# Patient Record
Sex: FEMALE | Race: WHITE | Employment: FULL TIME | URBAN - METROPOLITAN AREA
[De-identification: names, ages, dates, MRNs, and addresses within clinical notes are randomized per-mention and may not be internally consistent; named-entity substitution may affect disease eponyms.]

---

## 2017-02-25 ENCOUNTER — OFFICE VISIT (OUTPATIENT)
Dept: FAMILY MEDICINE CLINIC | Facility: CLINIC | Age: 57
End: 2017-02-25

## 2017-02-25 VITALS
BODY MASS INDEX: 27.89 KG/M2 | SYSTOLIC BLOOD PRESSURE: 128 MMHG | RESPIRATION RATE: 16 BRPM | WEIGHT: 157.38 LBS | TEMPERATURE: 99 F | HEIGHT: 63 IN | OXYGEN SATURATION: 100 % | HEART RATE: 82 BPM | DIASTOLIC BLOOD PRESSURE: 88 MMHG

## 2017-02-25 DIAGNOSIS — J04.0 LARYNGITIS: Primary | ICD-10-CM

## 2017-02-25 DIAGNOSIS — J06.9 VIRAL URI WITH COUGH: ICD-10-CM

## 2017-02-25 DIAGNOSIS — K21.9 GASTROESOPHAGEAL REFLUX DISEASE, ESOPHAGITIS PRESENCE NOT SPECIFIED: ICD-10-CM

## 2017-02-25 PROCEDURE — 99213 OFFICE O/P EST LOW 20 MIN: CPT | Performed by: FAMILY MEDICINE

## 2017-02-25 NOTE — PROGRESS NOTES
Messi Flores is a 60year old female.  Patient presents with:  Voice Problem  Cough  Nasal Congestion  Gastro-esophageal Reflux: acid reflux at night per pt     HPI:   Italo Tam presents to the office with complaints of upper respiratory tract infection, nakul Never Used                        Alcohol Use: Yes                Comment: occasionally       REVIEW OF SYSTEMS:   GENERAL: feels well otherwise  SKIN: no rashes  EYES:denies blurred vision or double vision  HEENT: congested, cough  LUNGS: denies shortness

## 2017-04-24 ENCOUNTER — TELEPHONE (OUTPATIENT)
Dept: FAMILY MEDICINE CLINIC | Facility: CLINIC | Age: 57
End: 2017-04-24

## 2017-05-22 ENCOUNTER — TELEPHONE (OUTPATIENT)
Dept: FAMILY MEDICINE CLINIC | Facility: CLINIC | Age: 57
End: 2017-05-22

## 2017-07-03 ENCOUNTER — TELEPHONE (OUTPATIENT)
Dept: FAMILY MEDICINE CLINIC | Facility: CLINIC | Age: 57
End: 2017-07-03

## 2017-08-18 ENCOUNTER — TELEPHONE (OUTPATIENT)
Dept: FAMILY MEDICINE CLINIC | Facility: CLINIC | Age: 57
End: 2017-08-18

## 2017-08-30 ENCOUNTER — NURSE ONLY (OUTPATIENT)
Dept: FAMILY MEDICINE CLINIC | Facility: CLINIC | Age: 57
End: 2017-08-30

## 2017-08-30 DIAGNOSIS — R73.09 ELEVATED HEMOGLOBIN A1C: ICD-10-CM

## 2017-08-30 DIAGNOSIS — E78.00 ELEVATED CHOLESTEROL: ICD-10-CM

## 2017-08-30 DIAGNOSIS — Z00.00 WELLNESS EXAMINATION: ICD-10-CM

## 2017-08-30 LAB
25-HYDROXYVITAMIN D (TOTAL): 33.3 NG/ML (ref 30–100)
BASOPHILS # BLD AUTO: 0.06 X10(3) UL (ref 0–0.1)
BASOPHILS NFR BLD AUTO: 0.8 %
CHOLEST SMN-MCNC: 244 MG/DL (ref ?–200)
EOSINOPHIL # BLD AUTO: 0.42 X10(3) UL (ref 0–0.3)
EOSINOPHIL NFR BLD AUTO: 5.7 %
ERYTHROCYTE [DISTWIDTH] IN BLOOD BY AUTOMATED COUNT: 12.2 % (ref 11.5–16)
HCT VFR BLD AUTO: 40.1 % (ref 34–50)
HDLC SERPL-MCNC: 45 MG/DL (ref 45–?)
HDLC SERPL: 5.42 {RATIO} (ref ?–4.44)
HGB BLD-MCNC: 13.5 G/DL (ref 12–16)
IMMATURE GRANULOCYTE COUNT: 0.02 X10(3) UL (ref 0–1)
IMMATURE GRANULOCYTE RATIO %: 0.3 %
LDLC SERPL CALC-MCNC: 165 MG/DL (ref ?–130)
LDLC SERPL-MCNC: 34 MG/DL (ref 5–40)
LYMPHOCYTES # BLD AUTO: 2.7 X10(3) UL (ref 0.9–4)
LYMPHOCYTES NFR BLD AUTO: 36.6 %
MCH RBC QN AUTO: 29.9 PG (ref 27–33.2)
MCHC RBC AUTO-ENTMCNC: 33.7 G/DL (ref 31–37)
MCV RBC AUTO: 88.9 FL (ref 81–100)
MONOCYTES # BLD AUTO: 0.68 X10(3) UL (ref 0.1–0.6)
MONOCYTES NFR BLD AUTO: 9.2 %
NEUTROPHIL ABS PRELIM: 3.49 X10 (3) UL (ref 1.3–6.7)
NEUTROPHILS # BLD AUTO: 3.49 X10(3) UL (ref 1.3–6.7)
NEUTROPHILS NFR BLD AUTO: 47.4 %
NONHDLC SERPL-MCNC: 199 MG/DL (ref ?–130)
PLATELET # BLD AUTO: 345 10(3)UL (ref 150–450)
RBC # BLD AUTO: 4.51 X10(6)UL (ref 3.8–5.1)
RED CELL DISTRIBUTION WIDTH-SD: 40 FL (ref 35.1–46.3)
TRIGLYCERIDES: 170 MG/DL (ref ?–150)
TSI SER-ACNC: 2.63 MIU/ML (ref 0.35–5.5)
WBC # BLD AUTO: 7.4 X10(3) UL (ref 4–13)

## 2017-08-30 PROCEDURE — 80061 LIPID PANEL: CPT | Performed by: FAMILY MEDICINE

## 2017-08-30 PROCEDURE — 84443 ASSAY THYROID STIM HORMONE: CPT | Performed by: FAMILY MEDICINE

## 2017-08-30 PROCEDURE — 36415 COLL VENOUS BLD VENIPUNCTURE: CPT | Performed by: FAMILY MEDICINE

## 2017-08-30 PROCEDURE — 82306 VITAMIN D 25 HYDROXY: CPT | Performed by: FAMILY MEDICINE

## 2017-08-30 PROCEDURE — 85025 COMPLETE CBC W/AUTO DIFF WBC: CPT | Performed by: FAMILY MEDICINE

## 2017-08-30 NOTE — PROGRESS NOTES
Mint gold and lav tubes drawn from left arm with butterfly needle x1.  Pt bill well    She does have a hx of fainting in the past so for safety I had her lie down on the table

## 2017-08-31 ENCOUNTER — TELEPHONE (OUTPATIENT)
Dept: FAMILY MEDICINE CLINIC | Facility: CLINIC | Age: 57
End: 2017-08-31

## 2017-08-31 NOTE — TELEPHONE ENCOUNTER
Notes Recorded by Eliezer Urrutia MD on 8/31/2017 at 9:10 AM CDT  Please notify pt labs looking pretty good--cholesterol panel looks better than last year with 4 point improvement in LDL and 64 point improvement in trigs.  Both are still a bit elevated, but

## 2017-10-04 ENCOUNTER — TELEPHONE (OUTPATIENT)
Dept: FAMILY MEDICINE CLINIC | Facility: CLINIC | Age: 57
End: 2017-10-04

## 2017-10-04 NOTE — TELEPHONE ENCOUNTER
Patient has never had colonoscopy. Patient is overdue for annual physical with pap with [address]. Patient will call tomorrow when she is not at work to schedule.   Patient is also going to check with her insurance to ensure that cologuard would be covered as sh

## 2017-10-16 ENCOUNTER — OFFICE VISIT (OUTPATIENT)
Dept: FAMILY MEDICINE CLINIC | Facility: CLINIC | Age: 57
End: 2017-10-16

## 2017-10-16 VITALS
SYSTOLIC BLOOD PRESSURE: 142 MMHG | RESPIRATION RATE: 18 BRPM | BODY MASS INDEX: 28.24 KG/M2 | DIASTOLIC BLOOD PRESSURE: 98 MMHG | HEART RATE: 92 BPM | HEIGHT: 63 IN | WEIGHT: 159.38 LBS | TEMPERATURE: 99 F

## 2017-10-16 DIAGNOSIS — N93.9 VAGINAL BLEEDING: Primary | ICD-10-CM

## 2017-10-16 DIAGNOSIS — N32.81 OVERACTIVE BLADDER: ICD-10-CM

## 2017-10-16 DIAGNOSIS — I83.90 VARICOSE VEIN OF LEG: ICD-10-CM

## 2017-10-16 DIAGNOSIS — K64.4 HEMORRHOIDS, EXTERNAL: ICD-10-CM

## 2017-10-16 DIAGNOSIS — N39.3 STRESS INCONTINENCE: ICD-10-CM

## 2017-10-16 DIAGNOSIS — Z12.4 CERVICAL CANCER SCREENING: ICD-10-CM

## 2017-10-16 PROCEDURE — 88175 CYTOPATH C/V AUTO FLUID REDO: CPT | Performed by: FAMILY MEDICINE

## 2017-10-16 PROCEDURE — 81003 URINALYSIS AUTO W/O SCOPE: CPT | Performed by: FAMILY MEDICINE

## 2017-10-16 PROCEDURE — 87624 HPV HI-RISK TYP POOLED RSLT: CPT | Performed by: FAMILY MEDICINE

## 2017-10-16 PROCEDURE — 99214 OFFICE O/P EST MOD 30 MIN: CPT | Performed by: FAMILY MEDICINE

## 2017-10-16 PROCEDURE — 99000 SPECIMEN HANDLING OFFICE-LAB: CPT | Performed by: FAMILY MEDICINE

## 2017-10-16 NOTE — PROGRESS NOTES
Benito Ag is a 60year old female. HPI:   Pt is concerned about vaginal bleeding on Friday. The last time she had vaginal bleeding was years ago. She has not had hysterectomy. She noticed it when she wiped.  She has hemorrhoids so she checked to see Smoker                                                              Smokeless tobacco: Never Used                      Alcohol use:  Yes              Comment: occasionally         REVIEW OF SYSTEMS:   GENERAL HEALTH: feels well otherwise  SKIN: denies any u

## 2017-10-16 NOTE — PATIENT INSTRUCTIONS
Dr. Paula Leija and Dr. Katarina Arenas (they are OB-gynes who also treat veins)  [phone]     Kady (have several locations, closest I believe is near [place]): [phone]

## 2017-10-19 ENCOUNTER — TELEPHONE (OUTPATIENT)
Dept: FAMILY MEDICINE CLINIC | Facility: CLINIC | Age: 57
End: 2017-10-19

## 2017-10-19 NOTE — TELEPHONE ENCOUNTER
Notes Recorded by Lennox Alanis, MD on 10/19/2017 at 12:22 AM CDT  Please notify PAP is normal, high risk HPV is negative. We can repeat PAP smear in 3years if in a monogamous relationship (if any new partners I suggest within 1-2 years of new exposure).  L

## 2018-01-03 ENCOUNTER — OFFICE VISIT (OUTPATIENT)
Dept: FAMILY MEDICINE CLINIC | Facility: CLINIC | Age: 58
End: 2018-01-03

## 2018-01-03 VITALS
TEMPERATURE: 98 F | WEIGHT: 157.63 LBS | RESPIRATION RATE: 12 BRPM | BODY MASS INDEX: 27.93 KG/M2 | DIASTOLIC BLOOD PRESSURE: 80 MMHG | SYSTOLIC BLOOD PRESSURE: 120 MMHG | HEART RATE: 76 BPM | HEIGHT: 63 IN

## 2018-01-03 DIAGNOSIS — Z12.11 COLON CANCER SCREENING: ICD-10-CM

## 2018-01-03 DIAGNOSIS — Z12.39 SCREENING BREAST EXAMINATION: ICD-10-CM

## 2018-01-03 DIAGNOSIS — E78.00 PURE HYPERCHOLESTEROLEMIA: ICD-10-CM

## 2018-01-03 DIAGNOSIS — Z23 NEED FOR VACCINATION: ICD-10-CM

## 2018-01-03 DIAGNOSIS — Z00.00 ROUTINE HISTORY AND PHYSICAL EXAMINATION OF ADULT: Primary | ICD-10-CM

## 2018-01-03 DIAGNOSIS — E78.1 HYPERTRIGLYCERIDEMIA: ICD-10-CM

## 2018-01-03 PROCEDURE — 99396 PREV VISIT EST AGE 40-64: CPT | Performed by: FAMILY MEDICINE

## 2018-01-03 PROCEDURE — 90715 TDAP VACCINE 7 YRS/> IM: CPT | Performed by: FAMILY MEDICINE

## 2018-01-03 PROCEDURE — 90471 IMMUNIZATION ADMIN: CPT | Performed by: FAMILY MEDICINE

## 2018-01-03 NOTE — PROGRESS NOTES
HPI:   Radha Payan is a 59year old female who presents for a complete physical exam.  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   They've been together over 2 years, work together [employer] after mouth. Disp:  Rfl:    CRANBERRY OR Take by mouth. Disp:  Rfl:    CUSTOM MEDICATION Beet root Disp:  Rfl:    Omega-3 Fatty Acids (FISH OIL OR) Take by mouth.  Disp:  Rfl:    CUSTOM MEDICATION Vital Red Disp:  Rfl:    aspirin 81 MG Oral Tab Take 81 mg by mout BMs, no blood in stool  : denies dysuria, vaginal discharge or itching, periods absent  MUSCULOSKELETAL: + back pain see HPI, no joint pains or swelling  NEURO: denies headaches, no syncope or near syncope  PSYCHE: denies depression or anxiety  HEMATOLOG after talking to her insurance  Dexa Scan not indicated  The patient indicates understanding of these issues and agrees to the plan. The patient is asked to return for CPX in 1 year.   Routine history and physical examination of adult  (primary encounter d

## 2018-02-14 ENCOUNTER — HOSPITAL ENCOUNTER (OUTPATIENT)
Dept: CT IMAGING | Facility: HOSPITAL | Age: 58
Discharge: HOME | End: 2018-02-14
Attending: FAMILY MEDICINE

## 2018-02-14 DIAGNOSIS — Z13.6 SCREENING FOR HEART DISEASE: ICD-10-CM

## 2018-02-19 DIAGNOSIS — R93.89 ABNORMAL CT OF THE CHEST: Primary | ICD-10-CM

## 2018-02-20 ENCOUNTER — TELEPHONE (OUTPATIENT)
Dept: FAMILY MEDICINE CLINIC | Facility: CLINIC | Age: 58
End: 2018-02-20

## 2018-02-20 DIAGNOSIS — R93.89 ABNORMAL CT OF THE CHEST: Primary | ICD-10-CM

## 2018-02-26 ENCOUNTER — HOSPITAL ENCOUNTER (OUTPATIENT)
Dept: CT IMAGING | Age: 58
Discharge: HOME | End: 2018-02-26
Attending: FAMILY MEDICINE
Payer: COMMERCIAL

## 2018-02-26 DIAGNOSIS — R93.89 ABNORMAL CT OF THE CHEST: ICD-10-CM

## 2018-02-26 PROCEDURE — 71260 CT THORAX DX C+: CPT | Performed by: FAMILY MEDICINE

## 2018-03-07 ENCOUNTER — HOSPITAL ENCOUNTER (OUTPATIENT)
Dept: CARDIOLOGY CLINIC | Facility: HOSPITAL | Age: 58
Discharge: HOME | End: 2018-03-07
Attending: FAMILY MEDICINE

## 2018-03-07 DIAGNOSIS — Z13.9 ENCOUNTER FOR SCREENING: ICD-10-CM

## 2018-06-12 ENCOUNTER — MED REC SCAN ONLY (OUTPATIENT)
Dept: FAMILY MEDICINE CLINIC | Facility: CLINIC | Age: 58
End: 2018-06-12

## 2018-11-27 ENCOUNTER — PATIENT OUTREACH (OUTPATIENT)
Dept: FAMILY MEDICINE CLINIC | Facility: CLINIC | Age: 58
End: 2018-11-27

## 2019-02-07 ENCOUNTER — OFFICE VISIT (OUTPATIENT)
Dept: FAMILY MEDICINE CLINIC | Facility: CLINIC | Age: 59
End: 2019-02-07
Payer: COMMERCIAL

## 2019-02-07 VITALS
HEART RATE: 66 BPM | TEMPERATURE: 99 F | SYSTOLIC BLOOD PRESSURE: 150 MMHG | HEIGHT: 62 IN | WEIGHT: 142.19 LBS | RESPIRATION RATE: 14 BRPM | DIASTOLIC BLOOD PRESSURE: 90 MMHG | BODY MASS INDEX: 26.17 KG/M2

## 2019-02-07 DIAGNOSIS — M54.6 CHRONIC BILATERAL THORACIC BACK PAIN: ICD-10-CM

## 2019-02-07 DIAGNOSIS — E78.1 HYPERTRIGLYCERIDEMIA: ICD-10-CM

## 2019-02-07 DIAGNOSIS — K21.9 GASTROESOPHAGEAL REFLUX DISEASE, ESOPHAGITIS PRESENCE NOT SPECIFIED: ICD-10-CM

## 2019-02-07 DIAGNOSIS — G89.29 CHRONIC BILATERAL THORACIC BACK PAIN: ICD-10-CM

## 2019-02-07 DIAGNOSIS — R93.89 ABNORMAL CT OF THE CHEST: ICD-10-CM

## 2019-02-07 DIAGNOSIS — Z00.00 ROUTINE HISTORY AND PHYSICAL EXAMINATION OF ADULT: Primary | ICD-10-CM

## 2019-02-07 DIAGNOSIS — E78.00 PURE HYPERCHOLESTEROLEMIA: ICD-10-CM

## 2019-02-07 DIAGNOSIS — Z12.31 SCREENING MAMMOGRAM, ENCOUNTER FOR: ICD-10-CM

## 2019-02-07 PROCEDURE — 99396 PREV VISIT EST AGE 40-64: CPT | Performed by: FAMILY MEDICINE

## 2019-02-07 RX ORDER — METOPROLOL SUCCINATE 25 MG/1
25 TABLET, EXTENDED RELEASE ORAL DAILY
Qty: 90 TABLET | Refills: 0 | Status: SHIPPED | OUTPATIENT
Start: 2019-02-07 | End: 2019-05-29 | Stop reason: SINTOL

## 2019-02-07 NOTE — PATIENT INSTRUCTIONS
Neuropsychological exam:    [facility]  [phone]    Or    [facility]  [phone]

## 2019-02-07 NOTE — PROGRESS NOTES
HPI:   Meghan Hussein is a 59year old female who presents for a complete physical exam.      Patient complains of anxiety/stress regarding her 's health--he recently was diagnosed with bladder cancer, but then found a spot on lung, had it removed Cholesterol (mg/dL)   Date Value   08/30/2017 45 (L)   10/20/2016 51     HDL CHOLESTEROL (mg/dL)   Date Value   05/26/2015 47     LDL Cholesterol (mg/dL)   Date Value   08/30/2017 165 (H)   10/20/2016 169 (H)     LDL-CHOLESTEROL (mg/dL (calc))   Date Value heartburn, no nausea, no changes in BMs, no blood in stool  : denies dysuria, vaginal discharge or itching, periods absent  MUSCULOSKELETAL: same chronic back pain, no joint pains or swelling  NEURO: denies headaches, no syncope or near syncope  PSYCHE: specified  Well controlled, CPM  Abnormal CT chest  -had referred patient to pulm last year and didn't go; focusing on 's health; encouraged her to f/u with pulm      For disease prevention (dementia, cancer, diabetes, heart disease, depression, anx

## 2019-02-12 ENCOUNTER — APPOINTMENT (OUTPATIENT)
Dept: PHYSICAL THERAPY | Age: 59
End: 2019-02-12
Attending: FAMILY MEDICINE
Payer: COMMERCIAL

## 2019-02-18 ENCOUNTER — APPOINTMENT (OUTPATIENT)
Dept: PHYSICAL THERAPY | Age: 59
End: 2019-02-18
Attending: FAMILY MEDICINE
Payer: COMMERCIAL

## 2019-02-18 NOTE — PROGRESS NOTES
SPINE EVALUATION:   Referring Physician: Dr. Cathern Hatchet  Diagnosis: Chronic bilateral thoracic back pain (M54.6,G89.29)     Date of Service: 2/18/2019     PATIENT Shannon Collins is a 59year old y/o female who presents to therapy today with complai visits over a 90 day period. Treatment will include: Manual Therapy; Therapeutic Exercises; Neuromuscular Re-education; Therapeutic Activity;  Electrical Stim; Mechanical Traction; Pt education; Home exercise program instruction; ***    Education or treatme

## 2019-02-20 ENCOUNTER — APPOINTMENT (OUTPATIENT)
Dept: PHYSICAL THERAPY | Age: 59
End: 2019-02-20
Attending: FAMILY MEDICINE
Payer: COMMERCIAL

## 2019-02-25 ENCOUNTER — APPOINTMENT (OUTPATIENT)
Dept: PHYSICAL THERAPY | Age: 59
End: 2019-02-25
Attending: FAMILY MEDICINE
Payer: COMMERCIAL

## 2019-02-27 ENCOUNTER — APPOINTMENT (OUTPATIENT)
Dept: PHYSICAL THERAPY | Age: 59
End: 2019-02-27
Attending: FAMILY MEDICINE
Payer: COMMERCIAL

## 2019-03-04 ENCOUNTER — APPOINTMENT (OUTPATIENT)
Dept: PHYSICAL THERAPY | Age: 59
End: 2019-03-04
Attending: FAMILY MEDICINE
Payer: COMMERCIAL

## 2019-03-06 ENCOUNTER — APPOINTMENT (OUTPATIENT)
Dept: PHYSICAL THERAPY | Age: 59
End: 2019-03-06
Attending: FAMILY MEDICINE
Payer: COMMERCIAL

## 2019-03-11 ENCOUNTER — PATIENT OUTREACH (OUTPATIENT)
Dept: FAMILY MEDICINE CLINIC | Facility: CLINIC | Age: 59
End: 2019-03-11

## 2019-03-11 ENCOUNTER — APPOINTMENT (OUTPATIENT)
Dept: PHYSICAL THERAPY | Age: 59
End: 2019-03-11
Attending: FAMILY MEDICINE
Payer: COMMERCIAL

## 2019-03-13 ENCOUNTER — APPOINTMENT (OUTPATIENT)
Dept: PHYSICAL THERAPY | Age: 59
End: 2019-03-13
Attending: FAMILY MEDICINE
Payer: COMMERCIAL

## 2019-04-09 ENCOUNTER — TELEPHONE (OUTPATIENT)
Dept: FAMILY MEDICINE CLINIC | Facility: CLINIC | Age: 59
End: 2019-04-09

## 2019-04-09 DIAGNOSIS — Z12.11 SCREENING FOR MALIGNANT NEOPLASM OF COLON: Primary | ICD-10-CM

## 2019-05-09 ENCOUNTER — TELEPHONE (OUTPATIENT)
Dept: FAMILY MEDICINE CLINIC | Facility: CLINIC | Age: 59
End: 2019-05-09

## 2019-05-22 ENCOUNTER — NURSE ONLY (OUTPATIENT)
Dept: FAMILY MEDICINE CLINIC | Facility: CLINIC | Age: 59
End: 2019-05-22
Payer: COMMERCIAL

## 2019-05-22 VITALS — DIASTOLIC BLOOD PRESSURE: 90 MMHG | SYSTOLIC BLOOD PRESSURE: 142 MMHG | HEART RATE: 64 BPM

## 2019-05-22 NOTE — PROGRESS NOTES
PT states she was started on metoprolol recently for elevated BP. She took first dose and felt dizzy. She then was cutting pill in half and working her way up to full tab. She is still feeling slightly dizzy.  She also states when she checks her BP at home,

## 2019-05-23 ENCOUNTER — TELEPHONE (OUTPATIENT)
Dept: FAMILY MEDICINE CLINIC | Facility: CLINIC | Age: 59
End: 2019-05-23

## 2019-05-23 NOTE — TELEPHONE ENCOUNTER
See nurse visit notes from 5/22/19. LM for patient to bring home BP cuff to appt tomorrow with [facility]. Ok per [place] form.      Future Appointments   Date Time Provider Naima Rodas   5/24/2019  2:30 PM Tej Roman MD [facility] KENNY Norris

## 2019-05-24 ENCOUNTER — OFFICE VISIT (OUTPATIENT)
Dept: FAMILY MEDICINE CLINIC | Facility: CLINIC | Age: 59
End: 2019-05-24
Payer: COMMERCIAL

## 2019-05-24 VITALS
SYSTOLIC BLOOD PRESSURE: 140 MMHG | BODY MASS INDEX: 26.31 KG/M2 | WEIGHT: 143 LBS | TEMPERATURE: 97 F | OXYGEN SATURATION: 97 % | HEART RATE: 86 BPM | DIASTOLIC BLOOD PRESSURE: 86 MMHG | HEIGHT: 62 IN

## 2019-05-24 DIAGNOSIS — I10 ESSENTIAL HYPERTENSION: Primary | ICD-10-CM

## 2019-05-24 DIAGNOSIS — I49.9 IRREGULAR HEART RATE: ICD-10-CM

## 2019-05-24 PROCEDURE — 99214 OFFICE O/P EST MOD 30 MIN: CPT | Performed by: FAMILY MEDICINE

## 2019-05-24 PROCEDURE — 93000 ELECTROCARDIOGRAM COMPLETE: CPT | Performed by: FAMILY MEDICINE

## 2019-05-24 NOTE — PROGRESS NOTES
Artie Love is a 59year old female. HPI:   Patient here to f/u on her BP and medication.     I had prescribed metoprolol in feb but she got dizzy so stopped it after a few days (had a lot going on in life dind't want to mess with it) so restarted 1/2 II   • Hypertension Mother    • Other (Other) Mother         thyroid   • Cancer Maternal Grandfather         throat   • Other (Other) Sister         thyroid   • Diabetes Brother         adult onset, no complications      Social History    Tobacco Use

## 2019-05-29 ENCOUNTER — OFFICE VISIT (OUTPATIENT)
Dept: FAMILY MEDICINE CLINIC | Facility: CLINIC | Age: 59
End: 2019-05-29
Payer: COMMERCIAL

## 2019-05-29 VITALS
HEIGHT: 62 IN | BODY MASS INDEX: 26.5 KG/M2 | HEART RATE: 82 BPM | RESPIRATION RATE: 16 BRPM | DIASTOLIC BLOOD PRESSURE: 86 MMHG | SYSTOLIC BLOOD PRESSURE: 128 MMHG | WEIGHT: 144 LBS | TEMPERATURE: 99 F

## 2019-05-29 DIAGNOSIS — E78.00 PURE HYPERCHOLESTEROLEMIA: ICD-10-CM

## 2019-05-29 DIAGNOSIS — R94.31 ABNORMAL EKG: ICD-10-CM

## 2019-05-29 DIAGNOSIS — R93.1 ELEVATED CORONARY ARTERY CALCIUM SCORE: Primary | ICD-10-CM

## 2019-05-29 DIAGNOSIS — T88.7XXA MEDICATION SIDE EFFECT: ICD-10-CM

## 2019-05-29 PROCEDURE — 93000 ELECTROCARDIOGRAM COMPLETE: CPT | Performed by: FAMILY MEDICINE

## 2019-05-29 PROCEDURE — 99213 OFFICE O/P EST LOW 20 MIN: CPT | Performed by: FAMILY MEDICINE

## 2019-05-29 NOTE — PROGRESS NOTES
Meghan Hussein is a 59year old female. HPI:   Patient here to f/u on her recent visit in which we suspected a metoprolol SE causing her symptoms and kaleb/PVCs. She felt completely back to normal within a few days of stopping the metoprolol.   Danhaja GENERAL: well developed, well nourished,in no apparent distress  LUNGS: clear to auscultation  CARDIO: RRR without murmur  EXTREMITIES: no cyanosis, clubbing or edema    ASSESSMENT AND PLAN:   Diagnoses and all orders for this visit:    Elevated coronary

## 2019-07-02 ENCOUNTER — OFFICE VISIT (OUTPATIENT)
Dept: CARDIOLOGY | Age: 59
End: 2019-07-02

## 2019-07-02 VITALS — HEART RATE: 65 BPM | SYSTOLIC BLOOD PRESSURE: 122 MMHG | DIASTOLIC BLOOD PRESSURE: 80 MMHG

## 2019-07-02 DIAGNOSIS — R93.1 HIGH CORONARY ARTERY CALCIUM SCORE: ICD-10-CM

## 2019-07-02 DIAGNOSIS — I49.3 PVC'S (PREMATURE VENTRICULAR CONTRACTIONS): ICD-10-CM

## 2019-07-02 DIAGNOSIS — E78.00 PURE HYPERCHOLESTEROLEMIA: ICD-10-CM

## 2019-07-02 DIAGNOSIS — I49.9 IRREGULAR HEART BEATS: Primary | ICD-10-CM

## 2019-07-02 PROCEDURE — 99205 OFFICE O/P NEW HI 60 MIN: CPT | Performed by: INTERNAL MEDICINE

## 2019-07-02 PROCEDURE — 93000 ELECTROCARDIOGRAM COMPLETE: CPT | Performed by: INTERNAL MEDICINE

## 2019-07-02 RX ORDER — ACETAMINOPHEN 160 MG
2000 TABLET,DISINTEGRATING ORAL
COMMUNITY

## 2019-07-02 RX ORDER — MULTIVITAMIN WITH IRON
250 TABLET ORAL
COMMUNITY

## 2019-07-05 ENCOUNTER — TELEPHONE (OUTPATIENT)
Dept: CARDIOLOGY | Age: 59
End: 2019-07-05

## 2019-07-05 DIAGNOSIS — E78.00 PURE HYPERCHOLESTEROLEMIA: Primary | ICD-10-CM

## 2019-07-05 RX ORDER — ROSUVASTATIN CALCIUM 5 MG/1
5 TABLET, COATED ORAL DAILY
Qty: 30 TABLET | Refills: 6 | Status: SHIPPED | OUTPATIENT
Start: 2019-07-05 | End: 2021-06-23 | Stop reason: SDUPTHER

## 2019-07-05 RX ORDER — ROSUVASTATIN CALCIUM 5 MG/1
5 TABLET, COATED ORAL DAILY
Qty: 30 TABLET | Refills: 6 | Status: SHIPPED | OUTPATIENT
Start: 2019-07-05 | End: 2019-07-05 | Stop reason: SDUPTHER

## 2019-12-11 ENCOUNTER — TELEPHONE (OUTPATIENT)
Dept: FAMILY MEDICINE CLINIC | Facility: CLINIC | Age: 59
End: 2019-12-11

## 2019-12-11 DIAGNOSIS — E78.1 HYPERTRIGLYCERIDEMIA: ICD-10-CM

## 2019-12-11 DIAGNOSIS — E78.00 PURE HYPERCHOLESTEROLEMIA: ICD-10-CM

## 2019-12-11 DIAGNOSIS — Z00.00 ROUTINE GENERAL MEDICAL EXAMINATION AT A HEALTH CARE FACILITY: Primary | ICD-10-CM

## 2019-12-11 NOTE — TELEPHONE ENCOUNTER
Spoke with the pt and she thinks that she is due for labs- she needs them printed out so she can shop around for the most cost effective     Pt will be in the office on Monday with her spouse and would like to  the orders.       Pt states that she is

## 2019-12-11 NOTE — TELEPHONE ENCOUNTER
Pt called, wants to discuss labs and if she is due for any and if so she would like to have the order sent to [facility].   Please call pt at [phone]

## 2020-02-06 LAB
ABSOLUTE BASOPHILS: 61 CELLS/UL (ref 0–200)
ABSOLUTE EOSINOPHILS: 456 CELLS/UL (ref 15–500)
ABSOLUTE LYMPHOCYTES: 2979 CELLS/UL (ref 850–3900)
ABSOLUTE MONOCYTES: 532 CELLS/UL (ref 200–950)
ABSOLUTE NEUTROPHILS: 3572 CELLS/UL (ref 1500–7800)
ALBUMIN/GLOBULIN RATIO: 1.7 (CALC) (ref 1–2.5)
ALBUMIN: 4.6 G/DL (ref 3.6–5.1)
ALKALINE PHOSPHATASE: 48 U/L (ref 37–153)
ALT: 15 U/L (ref 6–29)
AST: 15 U/L (ref 10–35)
BASOPHILS: 0.8 %
BILIRUBIN, TOTAL: 0.5 MG/DL (ref 0.2–1.2)
BUN: 22 MG/DL (ref 7–25)
CALCIUM: 10.2 MG/DL (ref 8.6–10.4)
CARBON DIOXIDE: 26 MMOL/L (ref 20–32)
CHLORIDE: 104 MMOL/L (ref 98–110)
CHOL/HDLC RATIO: 4.4 (CALC)
CHOLESTEROL, TOTAL: 248 MG/DL
CREATININE: 0.93 MG/DL (ref 0.5–0.99)
EGFR IF AFRICN AM: 77 ML/MIN/1.73M2
EGFR IF NONAFRICN AM: 66 ML/MIN/1.73M2
EOSINOPHILS: 6 %
GLOBULIN: 2.7 G/DL (CALC) (ref 1.9–3.7)
GLUCOSE: 96 MG/DL (ref 65–99)
HDL CHOLESTEROL: 57 MG/DL
HEMATOCRIT: 40.3 % (ref 35–45)
HEMOGLOBIN A1C: 5.6 % OF TOTAL HGB
HEMOGLOBIN: 14.2 G/DL (ref 11.7–15.5)
LDL-CHOLESTEROL: 164 MG/DL (CALC)
LYMPHOCYTES: 39.2 %
MCH: 30.3 PG (ref 27–33)
MCHC: 35.2 G/DL (ref 32–36)
MCV: 85.9 FL (ref 80–100)
MONOCYTES: 7 %
MPV: 10.3 FL (ref 7.5–12.5)
NEUTROPHILS: 47 %
NON-HDL CHOLESTEROL: 191 MG/DL (CALC)
PLATELET COUNT: 359 THOUSAND/UL (ref 140–400)
POTASSIUM: 4.4 MMOL/L (ref 3.5–5.3)
PROTEIN, TOTAL: 7.3 G/DL (ref 6.1–8.1)
RDW: 12.7 % (ref 11–15)
RED BLOOD CELL COUNT: 4.69 MILLION/UL (ref 3.8–5.1)
SODIUM: 139 MMOL/L (ref 135–146)
TRIGLYCERIDES: 134 MG/DL
TSH: 3.22 MIU/L (ref 0.4–4.5)
VITAMIN D, 25-OH, TOTAL: 50 NG/ML (ref 30–100)
WHITE BLOOD CELL COUNT: 7.6 THOUSAND/UL (ref 3.8–10.8)

## 2020-02-10 ENCOUNTER — OFFICE VISIT (OUTPATIENT)
Dept: FAMILY MEDICINE CLINIC | Facility: CLINIC | Age: 60
End: 2020-02-10
Payer: COMMERCIAL

## 2020-02-10 VITALS
WEIGHT: 145.25 LBS | OXYGEN SATURATION: 99 % | HEART RATE: 83 BPM | TEMPERATURE: 98 F | HEIGHT: 63 IN | BODY MASS INDEX: 25.73 KG/M2 | SYSTOLIC BLOOD PRESSURE: 124 MMHG | RESPIRATION RATE: 18 BRPM | DIASTOLIC BLOOD PRESSURE: 60 MMHG

## 2020-02-10 DIAGNOSIS — Z12.31 SCREENING MAMMOGRAM, ENCOUNTER FOR: ICD-10-CM

## 2020-02-10 DIAGNOSIS — M54.9 UPPER BACK PAIN: ICD-10-CM

## 2020-02-10 DIAGNOSIS — E78.00 PURE HYPERCHOLESTEROLEMIA: ICD-10-CM

## 2020-02-10 DIAGNOSIS — M54.2 NECK PAIN: ICD-10-CM

## 2020-02-10 DIAGNOSIS — Z00.00 ROUTINE HISTORY AND PHYSICAL EXAMINATION OF ADULT: Primary | ICD-10-CM

## 2020-02-10 DIAGNOSIS — R93.1 HIGH CORONARY ARTERY CALCIUM SCORE: ICD-10-CM

## 2020-02-10 DIAGNOSIS — E78.1 HYPERTRIGLYCERIDEMIA: ICD-10-CM

## 2020-02-10 DIAGNOSIS — I49.3 PVC'S (PREMATURE VENTRICULAR CONTRACTIONS): ICD-10-CM

## 2020-02-10 DIAGNOSIS — K21.9 GASTROESOPHAGEAL REFLUX DISEASE, ESOPHAGITIS PRESENCE NOT SPECIFIED: ICD-10-CM

## 2020-02-10 PROCEDURE — 99396 PREV VISIT EST AGE 40-64: CPT | Performed by: FAMILY MEDICINE

## 2020-02-10 NOTE — PROGRESS NOTES
HPI:   Yung Perez is a 62year old female who presents for a complete physical exam.      Patient complains of her 's health looking worse.   He was recently found to have mets from his cancer in his lower back and has further testing later this Value   08/30/2017 165 (H)   10/20/2016 169 (H)     LDL-CHOLESTEROL (mg/dL (calc))   Date Value   02/05/2020 164 (H)   05/26/2015 139 (H)     AST (U/L)   Date Value   02/05/2020 15   10/20/2016 19   05/26/2015 20     ALT (U/L)   Date Value   02/05/2020 15 depression or anxiety  HEMATOLOGIC: no bruising or noted lymph nodes    EXAM:   /60   Pulse 83   Temp 98.3 °F (36.8 °C) (Temporal)   Resp 18   Ht 63\"   Wt 145 lb 4 oz (65.9 kg)   SpO2 99%   BMI 25.73 kg/m²   Body mass index is 25.73 kg/m².    GENERAL

## 2020-02-28 ENCOUNTER — TELEPHONE (OUTPATIENT)
Dept: FAMILY MEDICINE CLINIC | Facility: CLINIC | Age: 60
End: 2020-02-28

## 2020-02-28 NOTE — TELEPHONE ENCOUNTER
Left message for the pt that she can call to schedule her MRI- left the phone number to central scheduling.      Advised to call if questions

## 2020-03-25 ENCOUNTER — TELEPHONE (OUTPATIENT)
Dept: FAMILY MEDICINE CLINIC | Facility: CLINIC | Age: 60
End: 2020-03-25

## 2020-03-25 NOTE — TELEPHONE ENCOUNTER
BOUBACAR if she so desires. I was calling to express my condolences over the passing of her .   I got a voicemail so it was clear it was her voicemail, but left a slightly cryptic message saying who I was, I had heard the news, I wanted to check in a

## 2020-03-30 ENCOUNTER — VIRTUAL PHONE E/M (OUTPATIENT)
Dept: FAMILY MEDICINE CLINIC | Facility: CLINIC | Age: 60
End: 2020-03-30
Payer: COMMERCIAL

## 2020-03-30 DIAGNOSIS — Z63.4 WIDOWED: Primary | ICD-10-CM

## 2020-03-30 SDOH — SOCIAL STABILITY - SOCIAL INSECURITY: DISSAPEARANCE AND DEATH OF FAMILY MEMBER: Z63.4

## 2020-03-30 NOTE — PROGRESS NOTES
Virtual/Telephone Check-In    Ephraim Duran verbally consents to a Virtual/Telephone Check-In service on 03/30/20. Patient understands and accepts financial responsibility for any deductible, co-insurance and/or co-pays associated with this service.

## 2020-07-23 ENCOUNTER — NURSE ONLY (OUTPATIENT)
Dept: FAMILY MEDICINE CLINIC | Facility: CLINIC | Age: 60
End: 2020-07-23
Payer: COMMERCIAL

## 2020-07-23 ENCOUNTER — TELEPHONE (OUTPATIENT)
Dept: FAMILY MEDICINE CLINIC | Facility: CLINIC | Age: 60
End: 2020-07-23

## 2020-07-23 DIAGNOSIS — R30.0 BURNING WITH URINATION: ICD-10-CM

## 2020-07-23 DIAGNOSIS — R35.0 URINE FREQUENCY: Primary | ICD-10-CM

## 2020-07-23 DIAGNOSIS — R30.0 DYSURIA: Primary | ICD-10-CM

## 2020-07-23 LAB
APPEARANCE: CLEAR
BILIRUBIN: NEGATIVE
GLUCOSE (URINE DIPSTICK): NEGATIVE MG/DL
MULTISTIX LOT#: NORMAL NUMERIC
NITRITE, URINE: NEGATIVE
OCCULT BLOOD: NEGATIVE
PH, URINE: 6 (ref 4.5–8)
PROTEIN (URINE DIPSTICK): NEGATIVE MG/DL
SPECIFIC GRAVITY: 1.02 (ref 1–1.03)
URINE-COLOR: YELLOW
UROBILINOGEN,SEMI-QN: 0.2 MG/DL (ref 0–1.9)

## 2020-07-23 PROCEDURE — 87086 URINE CULTURE/COLONY COUNT: CPT | Performed by: FAMILY MEDICINE

## 2020-07-23 PROCEDURE — 87077 CULTURE AEROBIC IDENTIFY: CPT | Performed by: FAMILY MEDICINE

## 2020-07-23 PROCEDURE — 87186 SC STD MICRODIL/AGAR DIL: CPT | Performed by: FAMILY MEDICINE

## 2020-07-23 PROCEDURE — 81003 URINALYSIS AUTO W/O SCOPE: CPT | Performed by: FAMILY MEDICINE

## 2020-07-23 NOTE — TELEPHONE ENCOUNTER
Pt states she is having urinary frequency and brurning. Pt has been increasing fluid.       Per verbal with Dr. Enrike Harmon ( covering provider) okay to come in for NV for Urine    Future Appointments   Date Time Provider Naima Rodas   7/23/2020  4:00 PM E

## 2020-09-16 ENCOUNTER — TELEPHONE (OUTPATIENT)
Dept: FAMILY MEDICINE CLINIC | Facility: CLINIC | Age: 60
End: 2020-09-16
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
## 2020-09-16 NOTE — TELEPHONE ENCOUNTER
Pt called back- she was feeling fine until Moday night- she felt like she was wheezing- she tried a benadryl only felt sleepy    She is still wheezing just not as bad- tried some vitamin C  Coughing only at night when laying on her back- hx of seasonal all

## 2020-10-14 ENCOUNTER — TELEPHONE (OUTPATIENT)
Dept: FAMILY MEDICINE CLINIC | Facility: CLINIC | Age: 60
End: 2020-10-14
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
## 2020-10-14 NOTE — TELEPHONE ENCOUNTER
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  Ray sta

## 2020-10-19 ENCOUNTER — TELEPHONE (OUTPATIENT)
Dept: FAMILY MEDICINE CLINIC | Facility: CLINIC | Age: 60
End: 2020-10-19

## 2021-01-01 ENCOUNTER — EXTERNAL RECORD (OUTPATIENT)
Dept: HEALTH INFORMATION MANAGEMENT | Facility: OTHER | Age: 61
End: 2021-01-01

## 2021-04-21 ENCOUNTER — OFFICE VISIT (OUTPATIENT)
Dept: FAMILY MEDICINE CLINIC | Facility: CLINIC | Age: 61
End: 2021-04-21
Payer: COMMERCIAL

## 2021-04-21 ENCOUNTER — HOSPITAL ENCOUNTER (OUTPATIENT)
Dept: MAMMOGRAPHY | Age: 61
Discharge: HOME | End: 2021-04-21
Attending: FAMILY MEDICINE
Payer: COMMERCIAL

## 2021-04-21 VITALS
SYSTOLIC BLOOD PRESSURE: 124 MMHG | TEMPERATURE: 98 F | WEIGHT: 136.81 LBS | HEART RATE: 93 BPM | DIASTOLIC BLOOD PRESSURE: 80 MMHG | BODY MASS INDEX: 24.55 KG/M2 | HEIGHT: 62.5 IN | OXYGEN SATURATION: 97 %

## 2021-04-21 DIAGNOSIS — E55.9 VITAMIN D DEFICIENCY: ICD-10-CM

## 2021-04-21 DIAGNOSIS — I70.90 ATHEROSCLEROSIS: ICD-10-CM

## 2021-04-21 DIAGNOSIS — G89.29 CHRONIC BILATERAL LOW BACK PAIN, UNSPECIFIED WHETHER SCIATICA PRESENT: ICD-10-CM

## 2021-04-21 DIAGNOSIS — Z12.31 SCREENING MAMMOGRAM, ENCOUNTER FOR: ICD-10-CM

## 2021-04-21 DIAGNOSIS — R93.1 HIGH CORONARY ARTERY CALCIUM SCORE: ICD-10-CM

## 2021-04-21 DIAGNOSIS — Z12.31 BREAST CANCER SCREENING BY MAMMOGRAM: ICD-10-CM

## 2021-04-21 DIAGNOSIS — M54.50 CHRONIC BILATERAL LOW BACK PAIN, UNSPECIFIED WHETHER SCIATICA PRESENT: ICD-10-CM

## 2021-04-21 DIAGNOSIS — E78.00 PURE HYPERCHOLESTEROLEMIA: ICD-10-CM

## 2021-04-21 DIAGNOSIS — F43.9 STRESS: ICD-10-CM

## 2021-04-21 DIAGNOSIS — Z00.00 ROUTINE HISTORY AND PHYSICAL EXAMINATION OF ADULT: Primary | ICD-10-CM

## 2021-04-21 DIAGNOSIS — E78.1 HYPERTRIGLYCERIDEMIA: ICD-10-CM

## 2021-04-21 DIAGNOSIS — I49.3 PVC (PREMATURE VENTRICULAR CONTRACTION): ICD-10-CM

## 2021-04-21 DIAGNOSIS — Z13.0 SCREENING, ANEMIA, DEFICIENCY, IRON: ICD-10-CM

## 2021-04-21 DIAGNOSIS — Z13.1 DIABETES MELLITUS SCREENING: ICD-10-CM

## 2021-04-21 DIAGNOSIS — M48.061 SPINAL STENOSIS OF LUMBAR REGION, UNSPECIFIED WHETHER NEUROGENIC CLAUDICATION PRESENT: ICD-10-CM

## 2021-04-21 DIAGNOSIS — Z12.11 COLON CANCER SCREENING: ICD-10-CM

## 2021-04-21 PROCEDURE — 3079F DIAST BP 80-89 MM HG: CPT | Performed by: FAMILY MEDICINE

## 2021-04-21 PROCEDURE — 3008F BODY MASS INDEX DOCD: CPT | Performed by: FAMILY MEDICINE

## 2021-04-21 PROCEDURE — 99396 PREV VISIT EST AGE 40-64: CPT | Performed by: FAMILY MEDICINE

## 2021-04-21 PROCEDURE — 3074F SYST BP LT 130 MM HG: CPT | Performed by: FAMILY MEDICINE

## 2021-04-21 PROCEDURE — 77067 SCR MAMMO BI INCL CAD: CPT | Performed by: FAMILY MEDICINE

## 2021-04-21 RX ORDER — ROSUVASTATIN CALCIUM 5 MG/1
5 TABLET, COATED ORAL NIGHTLY
Qty: 90 TABLET | Refills: 1 | Status: ON HOLD | OUTPATIENT
Start: 2021-04-21 | End: 2021-06-02

## 2021-04-21 NOTE — PROGRESS NOTES
HPI:   Brenda Márquez is a 56year old female who presents for a complete physical exam.      Patient complains of not having wanted to be around doctor office     She finds herself stresd/anxious quite a bit, she thinks b/c surrounded by so much illness Date Value   08/30/2017 165 (H)   10/20/2016 169 (H)     LDL-CHOLESTEROL (mg/dL (calc))   Date Value   02/05/2020 164 (H)   05/26/2015 139 (H)     AST (U/L)   Date Value   02/05/2020 15   10/20/2016 19   05/26/2015 20     ALT (U/L)   Date Value   02/05/2 are clear  EYES:PERRLA, EOMI, conjunctiva are clear  NECK: supple,no adenopathy,no thyromegaly, no JVDe  LUNGS: clear to auscultation  CARDIO: RRR without murmur  GI: good BS's,no masses, HSM or tenderness  EXTREMITIES: no cyanosis, clubbing or edema  NEUR TSH W REFLEX TO FREE T4; Future  -     Cancel: VITAMIN D, 25-HYDROXY; Future  -     CBC WITH DIFFERENTIAL WITH PLATELET; Future  -     COMP METABOLIC PANEL (14);  Future  -     TSH W REFLEX TO FREE T4; Future  -     VITAMIN D, 25-HYDROXY; Future  and  Ather Cancel: LIPID PANEL; Future  -     Cancel: TSH W REFLEX TO FREE T4; Future  -     Cancel: VITAMIN D, 25-HYDROXY; Future  -     CBC WITH DIFFERENTIAL WITH PLATELET; Future  -     COMP METABOLIC PANEL (14);  Future  -     HEMOGLOBIN A1C; Future  -     LIPID Cancel: LIPID PANEL; Future  -     Cancel: TSH W REFLEX TO FREE T4; Future  -     Cancel: VITAMIN D, 25-HYDROXY; Future  -     CBC WITH DIFFERENTIAL WITH PLATELET; Future  -     COMP METABOLIC PANEL (14);  Future  -     HEMOGLOBIN A1C; Future  -     LIPID P

## 2021-05-12 ENCOUNTER — HOSPITAL ENCOUNTER (OUTPATIENT)
Dept: CV DIAGNOSTICS | Age: 61
Discharge: HOME | End: 2021-05-12
Attending: FAMILY MEDICINE
Payer: COMMERCIAL

## 2021-05-12 DIAGNOSIS — I49.3 PVC (PREMATURE VENTRICULAR CONTRACTION): ICD-10-CM

## 2021-05-12 DIAGNOSIS — I70.90 ATHEROSCLEROSIS: ICD-10-CM

## 2021-05-12 PROCEDURE — 93018 CV STRESS TEST I&R ONLY: CPT | Performed by: FAMILY MEDICINE

## 2021-05-12 PROCEDURE — 93017 CV STRESS TEST TRACING ONLY: CPT | Performed by: FAMILY MEDICINE

## 2021-05-12 PROCEDURE — 78452 HT MUSCLE IMAGE SPECT MULT: CPT | Performed by: FAMILY MEDICINE

## 2021-05-12 NOTE — PROGRESS NOTES
Out patient here in [place] for [facility] nuclear stress test.  Resting /70, but patient became symptomatically hypotensive and bradycardic with lexiscan infusion, clammy, stating she felt like she was going to faint.   Aminophylline 250 mg slow IVP

## 2021-05-15 ENCOUNTER — TELEPHONE (OUTPATIENT)
Dept: FAMILY MEDICINE CLINIC | Facility: CLINIC | Age: 61
End: 2021-05-15

## 2021-05-17 ENCOUNTER — TELEPHONE (OUTPATIENT)
Dept: CARDIOLOGY | Age: 61
End: 2021-05-17

## 2021-05-17 ENCOUNTER — OFFICE VISIT (OUTPATIENT)
Dept: FAMILY MEDICINE CLINIC | Facility: CLINIC | Age: 61
End: 2021-05-17
Payer: COMMERCIAL

## 2021-05-17 VITALS
DIASTOLIC BLOOD PRESSURE: 70 MMHG | TEMPERATURE: 98 F | SYSTOLIC BLOOD PRESSURE: 122 MMHG | WEIGHT: 134.25 LBS | RESPIRATION RATE: 16 BRPM | BODY MASS INDEX: 24 KG/M2 | HEART RATE: 80 BPM | OXYGEN SATURATION: 99 %

## 2021-05-17 DIAGNOSIS — R93.1 HIGH CORONARY ARTERY CALCIUM SCORE: ICD-10-CM

## 2021-05-17 DIAGNOSIS — R94.39 ABNORMAL STRESS TEST: ICD-10-CM

## 2021-05-17 DIAGNOSIS — I44.7 LBBB (LEFT BUNDLE BRANCH BLOCK): ICD-10-CM

## 2021-05-17 DIAGNOSIS — E78.1 HYPERTRIGLYCERIDEMIA: ICD-10-CM

## 2021-05-17 DIAGNOSIS — R06.00 DOE (DYSPNEA ON EXERTION): ICD-10-CM

## 2021-05-17 DIAGNOSIS — E78.00 PURE HYPERCHOLESTEROLEMIA: ICD-10-CM

## 2021-05-17 DIAGNOSIS — I25.10 CORONARY ARTERY DISEASE INVOLVING NATIVE HEART, UNSPECIFIED VESSEL OR LESION TYPE, UNSPECIFIED WHETHER ANGINA PRESENT: Primary | ICD-10-CM

## 2021-05-17 DIAGNOSIS — R94.39 ABNORMAL NUCLEAR STRESS TEST: ICD-10-CM

## 2021-05-17 DIAGNOSIS — Z01.818 PRE-OP TESTING: Primary | ICD-10-CM

## 2021-05-17 PROCEDURE — 3074F SYST BP LT 130 MM HG: CPT | Performed by: FAMILY MEDICINE

## 2021-05-17 PROCEDURE — 3078F DIAST BP <80 MM HG: CPT | Performed by: FAMILY MEDICINE

## 2021-05-17 PROCEDURE — 99214 OFFICE O/P EST MOD 30 MIN: CPT | Performed by: FAMILY MEDICINE

## 2021-05-17 NOTE — PROGRESS NOTES
Meka Carrion is a 56year old female. HPI:   Patient here to f/u on stress test results.     CLINICAL HISTORY: The patient is a 25-year-old woman with abnormal ultrafast heart scan.      REGADENOSON FINDINGS:  The patient underwent intravenous infusion 14:44  t   [id number] 5/13/2021 8:20 AM #[id number]  --------------  Patient would like to review the above results.   During the test on Wednesday she got quite tired, pre-syncopal. Recovered with rest, IVF, She does note having had some chest discomfort after th onset, no complications      Social History    Tobacco Use      Smoking status: Never Smoker      Smokeless tobacco: Never Used    Vaping Use      Vaping Use: Never used    Alcohol use: Yes      Comment: usually 7 or less/dwayne    Drug use: No         REV

## 2021-05-20 ENCOUNTER — TELEPHONE (OUTPATIENT)
Dept: FAMILY MEDICINE CLINIC | Facility: CLINIC | Age: 61
End: 2021-05-20

## 2021-05-20 NOTE — TELEPHONE ENCOUNTER
Pt reports she had stress test done last week and felt tired afterwards. She reports that Dr. Karena Newby mentioned her heart muscle weakened by about 50%. She reports shortness of breath related to this. Pt currently denies dizziness / lightheadedness.   Pt did

## 2021-05-21 ENCOUNTER — NURSE ONLY (OUTPATIENT)
Dept: FAMILY MEDICINE CLINIC | Facility: CLINIC | Age: 61
End: 2021-05-21
Payer: COMMERCIAL

## 2021-05-21 ENCOUNTER — TELEPHONE (OUTPATIENT)
Dept: FAMILY MEDICINE CLINIC | Facility: CLINIC | Age: 61
End: 2021-05-21

## 2021-05-21 ENCOUNTER — HOSPITAL ENCOUNTER (OUTPATIENT)
Dept: GENERAL RADIOLOGY | Age: 61
Discharge: HOME | End: 2021-05-21
Attending: FAMILY MEDICINE
Payer: COMMERCIAL

## 2021-05-21 ENCOUNTER — MED REC SCAN ONLY (OUTPATIENT)
Dept: FAMILY MEDICINE CLINIC | Facility: CLINIC | Age: 61
End: 2021-05-21

## 2021-05-21 VITALS — DIASTOLIC BLOOD PRESSURE: 70 MMHG | HEART RATE: 78 BPM | SYSTOLIC BLOOD PRESSURE: 118 MMHG

## 2021-05-21 DIAGNOSIS — Z01.818 PREOP EXAMINATION: Primary | ICD-10-CM

## 2021-05-21 DIAGNOSIS — R94.39 ABNORMAL STRESS TEST: ICD-10-CM

## 2021-05-21 DIAGNOSIS — I49.9 IRREGULAR HEARTBEAT: Primary | ICD-10-CM

## 2021-05-21 PROCEDURE — 71046 X-RAY EXAM CHEST 2 VIEWS: CPT | Performed by: FAMILY MEDICINE

## 2021-05-21 PROCEDURE — 3074F SYST BP LT 130 MM HG: CPT | Performed by: FAMILY MEDICINE

## 2021-05-21 PROCEDURE — 3078F DIAST BP <80 MM HG: CPT | Performed by: FAMILY MEDICINE

## 2021-05-21 PROCEDURE — 93000 ELECTROCARDIOGRAM COMPLETE: CPT | Performed by: FAMILY MEDICINE

## 2021-05-21 NOTE — PROGRESS NOTES
Pt's /70  HR 78, irregular rhythm    Pt's pulse ox 98%, HR 75    EKG completed. Pt to get CXR for pre-op angiogram scheduled 6/2/21  Pt was given [facility] lab orders. All questions/concerns addressed. Patient left in stable condition.

## 2021-05-26 ENCOUNTER — TELEPHONE (OUTPATIENT)
Dept: CARDIOLOGY | Age: 61
End: 2021-05-26

## 2021-05-26 DIAGNOSIS — Z01.818 PREOP TESTING: Primary | ICD-10-CM

## 2021-05-28 ENCOUNTER — TELEPHONE (OUTPATIENT)
Dept: FAMILY MEDICINE CLINIC | Facility: CLINIC | Age: 61
End: 2021-05-28

## 2021-05-28 RX ORDER — ASPIRIN 81 MG/1
81 TABLET ORAL DAILY
COMMUNITY

## 2021-05-28 RX ORDER — OMEPRAZOLE 20 MG/1
20 CAPSULE, DELAYED RELEASE ORAL DAILY PRN
COMMUNITY
End: 2021-06-22

## 2021-05-28 NOTE — TELEPHONE ENCOUNTER
Pt called back. She said the labs were drawn at [facility]. She apologized she thought it was [facility].     Are those results viewable?

## 2021-05-28 NOTE — TELEPHONE ENCOUNTER
Pt had lab work completed at [address] [place] [address] on 5/25/21. She would like to know if they have been received yet.     Please advise

## 2021-05-28 NOTE — HISTORICAL OFFICE NOTE
Progress Notes  - documented in this encounter  Zaid Rizzo MD - 07/02/2019 4:00 PM CDT  Formatting of this note might be different from the original.  7/2/2019    [address] [facility] [address] Smoking status: Never Smoker   • Smokeless tobacco: Never Used   Substance Use Topics   • Alcohol use: Yes   Alcohol/week: 6.0 oz   Types: 10 Cans of beer per week   Comment: 10 beers weekly.    • Drug use: Never     Allergies:     ALLERGIES:   Allergen Nando Aus patient regarding her cardiac status. At this point I recommended that she move forward with a stress [facility]. She appears willing to do this. I have also explained the rationale for cholesterol-lowering therapy.  She s

## 2021-05-30 ENCOUNTER — LAB ENCOUNTER (OUTPATIENT)
Dept: LAB | Facility: HOSPITAL | Age: 61
End: 2021-05-30
Attending: INTERNAL MEDICINE
Payer: COMMERCIAL

## 2021-05-30 DIAGNOSIS — R94.39 ABNORMAL NUCLEAR STRESS TEST: ICD-10-CM

## 2021-06-01 ENCOUNTER — MED REC SCAN ONLY (OUTPATIENT)
Dept: FAMILY MEDICINE CLINIC | Facility: CLINIC | Age: 61
End: 2021-06-01

## 2021-06-01 ENCOUNTER — TELEPHONE (OUTPATIENT)
Dept: CARDIOLOGY | Age: 61
End: 2021-06-01

## 2021-06-02 ENCOUNTER — HOSPITAL ENCOUNTER (OUTPATIENT)
Dept: INTERVENTIONAL RADIOLOGY/VASCULAR | Facility: HOSPITAL | Age: 61
Discharge: HOME | End: 2021-06-02
Attending: INTERNAL MEDICINE | Admitting: INTERNAL MEDICINE
Payer: COMMERCIAL

## 2021-06-02 VITALS
SYSTOLIC BLOOD PRESSURE: 108 MMHG | RESPIRATION RATE: 16 BRPM | DIASTOLIC BLOOD PRESSURE: 56 MMHG | HEIGHT: 62 IN | BODY MASS INDEX: 24.66 KG/M2 | OXYGEN SATURATION: 94 % | WEIGHT: 134 LBS | TEMPERATURE: 98 F | HEART RATE: 83 BPM

## 2021-06-02 DIAGNOSIS — R94.39 ABNORMAL NUCLEAR STRESS TEST: Primary | ICD-10-CM

## 2021-06-02 PROCEDURE — 99153 MOD SED SAME PHYS/QHP EA: CPT

## 2021-06-02 PROCEDURE — 4A023N8 MEASUREMENT OF CARDIAC SAMPLING AND PRESSURE, BILATERAL, PERCUTANEOUS APPROACH: ICD-10-PCS | Performed by: INTERNAL MEDICINE

## 2021-06-02 PROCEDURE — B2151ZZ FLUOROSCOPY OF LEFT HEART USING LOW OSMOLAR CONTRAST: ICD-10-PCS | Performed by: INTERNAL MEDICINE

## 2021-06-02 PROCEDURE — 99152 MOD SED SAME PHYS/QHP 5/>YRS: CPT

## 2021-06-02 PROCEDURE — 93460 R&L HRT ART/VENTRICLE ANGIO: CPT | Performed by: INTERNAL MEDICINE

## 2021-06-02 PROCEDURE — 93460 R&L HRT ART/VENTRICLE ANGIO: CPT

## 2021-06-02 PROCEDURE — B2111ZZ FLUOROSCOPY OF MULTIPLE CORONARY ARTERIES USING LOW OSMOLAR CONTRAST: ICD-10-PCS | Performed by: INTERNAL MEDICINE

## 2021-06-02 PROCEDURE — 99152 MOD SED SAME PHYS/QHP 5/>YRS: CPT | Performed by: INTERNAL MEDICINE

## 2021-06-02 RX ORDER — MIDAZOLAM HYDROCHLORIDE 1 MG/ML
INJECTION INTRAMUSCULAR; INTRAVENOUS
Status: COMPLETED
Start: 2021-06-02 | End: 2021-06-02

## 2021-06-02 RX ORDER — SODIUM CHLORIDE 9 MG/ML
INJECTION, SOLUTION INTRAVENOUS CONTINUOUS
Status: DISCONTINUED | OUTPATIENT
Start: 2021-06-02 | End: 2021-06-02

## 2021-06-02 RX ORDER — ROSUVASTATIN CALCIUM 5 MG/1
20 TABLET, COATED ORAL NIGHTLY
Qty: 90 TABLET | Refills: 1 | Status: SHIPPED | OUTPATIENT
Start: 2021-06-02 | End: 2021-07-24

## 2021-06-02 RX ORDER — METOPROLOL SUCCINATE 25 MG/1
25 TABLET, EXTENDED RELEASE ORAL DAILY
Qty: 60 TABLET | Refills: 2 | Status: ON HOLD | OUTPATIENT
Start: 2021-06-02 | End: 2021-06-19

## 2021-06-02 RX ORDER — HEPARIN SODIUM 5000 [USP'U]/ML
INJECTION, SOLUTION INTRAVENOUS; SUBCUTANEOUS
Status: COMPLETED
Start: 2021-06-02 | End: 2021-06-02

## 2021-06-02 RX ORDER — LIDOCAINE HYDROCHLORIDE 10 MG/ML
INJECTION, SOLUTION EPIDURAL; INFILTRATION; INTRACAUDAL; PERINEURAL
Status: COMPLETED
Start: 2021-06-02 | End: 2021-06-02

## 2021-06-02 RX ORDER — SODIUM CHLORIDE 9 MG/ML
INJECTION, SOLUTION INTRAVENOUS
Status: DISCONTINUED | OUTPATIENT
Start: 2021-06-03 | End: 2021-06-02 | Stop reason: HOSPADM

## 2021-06-02 RX ADMIN — SODIUM CHLORIDE: 9 INJECTION, SOLUTION INTRAVENOUS at 15:00:00

## 2021-06-02 NOTE — PROCEDURES
[facility]    PATIENT'S NAME: Irineo Angulo   ATTENDING PHYSICIAN: London Chirinos M.D. OPERATING PHYSICIAN: London Chirinos M.D.    PATIENT ACCOUNT#:   [de-identified]    LOCATION:  [address]  MEDICAL RECORD #:   [id number]       STEVE vein.    HEMODYNAMICS:  Mean right atrial pressure 2. Right ventricular pressure 27/2. Pulmonary arterial pressure 25/9 with a mean of 15. Pulmonary capillary wedge pressure 5. Left ventricular end-diastolic pressure 12.     Using an estimated Benja, the

## 2021-06-02 NOTE — PROGRESS NOTES
Prelim    No obstructive CAD or PAH    Severe LV dysfunction    Medical therapy aimed at LV dysfunction    Outpatient sleep study    Possible CRT if fails to improve over the next 3 months. Follow up will be with me in the office.     New medications:

## 2021-06-02 NOTE — CONSULTS
[facility]    Report of Consultation    Moy Irwin Patient Status:  Outpatient in a Bed    1960 MRN [id number]   [facility] Attending Bernice Mullen MD   Hosp Day # 0 PCP Ailyn Miranda MD     Jose Francisco infusion, , Intravenous, On Call    Review of Systems:  All systems were reviewed and are negative except as described above in HPI. Physical Exam:  Blood pressure 143/70, pulse 84, temperature 97.9 °F (36.6 °C), temperature source Temporal, resp.  rate

## 2021-06-02 NOTE — PLAN OF CARE
Patient had [facility] with Dr. Jackie Castro today. Right groin dressing in place, CDI, soft, no hematoma, +1 pedal pulse. VSS. Patient denies any pain. Family @ bedside. Dr. Jackie Castro @ bedside post procedure. Patient tolerating po intake. Recovery period completed.  P

## 2021-06-03 ENCOUNTER — TELEPHONE (OUTPATIENT)
Dept: FAMILY MEDICINE CLINIC | Facility: CLINIC | Age: 61
End: 2021-06-03

## 2021-06-03 ENCOUNTER — HOSPITAL ENCOUNTER (EMERGENCY)
Facility: HOSPITAL | Age: 61
Discharge: HOME | End: 2021-06-03
Attending: EMERGENCY MEDICINE
Payer: COMMERCIAL

## 2021-06-03 ENCOUNTER — PATIENT OUTREACH (OUTPATIENT)
Dept: CASE MANAGEMENT | Age: 61
End: 2021-06-03

## 2021-06-03 ENCOUNTER — APPOINTMENT (OUTPATIENT)
Dept: ULTRASOUND IMAGING | Facility: HOSPITAL | Age: 61
End: 2021-06-03
Attending: EMERGENCY MEDICINE
Payer: COMMERCIAL

## 2021-06-03 VITALS
TEMPERATURE: 97 F | SYSTOLIC BLOOD PRESSURE: 120 MMHG | RESPIRATION RATE: 21 BRPM | BODY MASS INDEX: 24.66 KG/M2 | HEIGHT: 62 IN | DIASTOLIC BLOOD PRESSURE: 83 MMHG | WEIGHT: 134 LBS | HEART RATE: 72 BPM | OXYGEN SATURATION: 98 %

## 2021-06-03 DIAGNOSIS — R10.31 RIGHT GROIN PAIN: Primary | ICD-10-CM

## 2021-06-03 PROCEDURE — 99284 EMERGENCY DEPT VISIT MOD MDM: CPT

## 2021-06-03 PROCEDURE — 76882 US LMTD JT/FCL EVL NVASC XTR: CPT | Performed by: EMERGENCY MEDICINE

## 2021-06-03 NOTE — ED INITIAL ASSESSMENT (HPI)
States had an angiogram yesterday in the morning. This morning, had a BM, felt a \"pop\" and became dizzy. Did not pass out. Became dizzy again. Noted bruising to the area.

## 2021-06-03 NOTE — PROGRESS NOTES
Patient called requesting assistance scheduling apt    [facility] [address] [place]  [phone]  Apt made with Magdaleno STALEY for Wed. June 23rd @10am

## 2021-06-03 NOTE — ED PROVIDER NOTES
No heme at  Patient Seen in: [facility] Emergency Department      History   Patient presents with:  Postop/Procedure Problem  Dizziness    Stated Complaint: HEMATOMA S/P ANGIOGRAM    HPI/Subjective:   HPI    Patient had a  a left and right heart razia motion. Neck supple. No JVD present. Cardiovascular: Normal rate and regular rhythm. Normal peripheral perfusion with good color. Pulmonary/Chest: Normal effort. No accessory muscle use. No clubbing, no cyanosis. Abdominal: Soft.  There is no tende

## 2021-06-03 NOTE — PROGRESS NOTES
Pt was called for follow up call. Pt stated she is currently in the ER. Pt stated after having a BM this am, she heard a \"pop\"  She is in the ER waiting on results.

## 2021-06-03 NOTE — TELEPHONE ENCOUNTER
PT CALLED AND ADV THAT SHE WAS DISCHARGED FROM [place] YESTERDAY AND THINKS SHE HAS [address] [facility] ADV PT TO GO TO [facility]    PT JUST CALLED BACK THAT [facility] ADV PT TO GO BACK TO [place] [address].     PT ON HER WAY BACK TO [facility]    JUST WANTED TO LET DR Leal Neither

## 2021-06-07 ENCOUNTER — OFFICE VISIT (OUTPATIENT)
Dept: FAMILY MEDICINE CLINIC | Facility: CLINIC | Age: 61
End: 2021-06-07
Payer: COMMERCIAL

## 2021-06-07 VITALS
OXYGEN SATURATION: 99 % | DIASTOLIC BLOOD PRESSURE: 70 MMHG | WEIGHT: 136.81 LBS | BODY MASS INDEX: 25 KG/M2 | HEART RATE: 56 BPM | SYSTOLIC BLOOD PRESSURE: 110 MMHG | TEMPERATURE: 99 F

## 2021-06-07 DIAGNOSIS — R06.83 SNORING: Primary | ICD-10-CM

## 2021-06-07 DIAGNOSIS — I51.9 LV DYSFUNCTION: ICD-10-CM

## 2021-06-07 DIAGNOSIS — J30.2 SEASONAL ALLERGIC RHINITIS, UNSPECIFIED TRIGGER: ICD-10-CM

## 2021-06-07 DIAGNOSIS — Z71.2 ENCOUNTER TO DISCUSS TEST RESULTS: ICD-10-CM

## 2021-06-07 DIAGNOSIS — R94.30 EJECTION FRACTION < 50%: ICD-10-CM

## 2021-06-07 PROBLEM — IMO0002 EJECTION FRACTION < 50%: Status: ACTIVE | Noted: 2021-06-07

## 2021-06-07 PROCEDURE — 99215 OFFICE O/P EST HI 40 MIN: CPT | Performed by: FAMILY MEDICINE

## 2021-06-07 PROCEDURE — 3078F DIAST BP <80 MM HG: CPT | Performed by: FAMILY MEDICINE

## 2021-06-07 PROCEDURE — 3074F SYST BP LT 130 MM HG: CPT | Performed by: FAMILY MEDICINE

## 2021-06-07 RX ORDER — AMOXICILLIN 250 MG
2 CAPSULE ORAL AS NEEDED
COMMUNITY

## 2021-06-07 NOTE — PROGRESS NOTES
Prabhjot Tubbs is a 56year old female. HPI:   Patient here to f/u on her recent cath with Dr. Michell Rutherford. Found out no significant blockages. Hopeful with meds and cardiac rehab will regain strength in heart muscle.   They wondering if could've been viral Problem Relation Age of Onset   • Heart Disorder Father         AAA   • Diabetes Mother 80        II   • Hypertension Mother    • Other (Other) Mother         thyroid   • Cancer Maternal Grandfather         throat   • Other (Other) Sister         thyroid coricidin preferred to sudafed for her       The patient indicates understanding of these issues and agrees to the plan.

## 2021-06-15 ENCOUNTER — OFFICE VISIT (OUTPATIENT)
Dept: FAMILY MEDICINE CLINIC | Facility: CLINIC | Age: 61
End: 2021-06-15
Payer: COMMERCIAL

## 2021-06-15 ENCOUNTER — HOSPITAL ENCOUNTER (INPATIENT)
Facility: HOSPITAL | Age: 61
LOS: 4 days | Discharge: HOME | DRG: 227 | End: 2021-06-19
Attending: INTERNAL MEDICINE | Admitting: INTERNAL MEDICINE
Payer: COMMERCIAL

## 2021-06-15 ENCOUNTER — TELEPHONE (OUTPATIENT)
Dept: CARDIOLOGY | Age: 61
End: 2021-06-15

## 2021-06-15 VITALS
HEIGHT: 62 IN | SYSTOLIC BLOOD PRESSURE: 130 MMHG | HEART RATE: 75 BPM | WEIGHT: 136 LBS | DIASTOLIC BLOOD PRESSURE: 80 MMHG | BODY MASS INDEX: 25.03 KG/M2 | TEMPERATURE: 98 F | RESPIRATION RATE: 16 BRPM | OXYGEN SATURATION: 98 %

## 2021-06-15 DIAGNOSIS — Z02.9 ADMINISTRATIVE ENCOUNTER: Primary | ICD-10-CM

## 2021-06-15 PROBLEM — R00.1 BRADYCARDIA: Status: ACTIVE | Noted: 2021-06-15

## 2021-06-15 PROCEDURE — 3075F SYST BP GE 130 - 139MM HG: CPT | Performed by: PHYSICIAN ASSISTANT

## 2021-06-15 PROCEDURE — 3008F BODY MASS INDEX DOCD: CPT | Performed by: PHYSICIAN ASSISTANT

## 2021-06-15 PROCEDURE — 3079F DIAST BP 80-89 MM HG: CPT | Performed by: PHYSICIAN ASSISTANT

## 2021-06-15 PROCEDURE — 99220 INITIAL OBSERVATION CARE,LEVL III: CPT | Performed by: INTERNAL MEDICINE

## 2021-06-15 RX ORDER — PROCHLORPERAZINE EDISYLATE 5 MG/ML
5 INJECTION INTRAMUSCULAR; INTRAVENOUS EVERY 8 HOURS PRN
Status: DISCONTINUED | OUTPATIENT
Start: 2021-06-15 | End: 2021-06-19

## 2021-06-15 RX ORDER — ACETAMINOPHEN 325 MG/1
650 TABLET ORAL EVERY 6 HOURS PRN
Status: DISCONTINUED | OUTPATIENT
Start: 2021-06-15 | End: 2021-06-19

## 2021-06-15 RX ORDER — ONDANSETRON 2 MG/ML
4 INJECTION INTRAMUSCULAR; INTRAVENOUS EVERY 6 HOURS PRN
Status: DISCONTINUED | OUTPATIENT
Start: 2021-06-15 | End: 2021-06-19

## 2021-06-15 RX ORDER — ASPIRIN 81 MG/1
81 TABLET ORAL DAILY
Status: DISCONTINUED | OUTPATIENT
Start: 2021-06-15 | End: 2021-06-19

## 2021-06-15 RX ORDER — HEPARIN SODIUM 5000 [USP'U]/ML
5000 INJECTION, SOLUTION INTRAVENOUS; SUBCUTANEOUS EVERY 8 HOURS SCHEDULED
Status: DISCONTINUED | OUTPATIENT
Start: 2021-06-15 | End: 2021-06-19

## 2021-06-15 RX ORDER — PANTOPRAZOLE SODIUM 20 MG/1
20 TABLET, DELAYED RELEASE ORAL
Status: DISCONTINUED | OUTPATIENT
Start: 2021-06-16 | End: 2021-06-17

## 2021-06-15 RX ORDER — ROSUVASTATIN CALCIUM 20 MG/1
20 TABLET, COATED ORAL NIGHTLY
Status: DISCONTINUED | OUTPATIENT
Start: 2021-06-15 | End: 2021-06-19

## 2021-06-15 NOTE — PROGRESS NOTES
56year old female presents to the walk in clinic with her  with complaints of feeling dizzy, weak, shakey for 2 hours. She denies chest pain but does feel a sense of pressure on her upper chest.  No SOB.   She thought she could be having an allergi

## 2021-06-16 PROCEDURE — 99214 OFFICE O/P EST MOD 30 MIN: CPT | Performed by: INTERNAL MEDICINE

## 2021-06-16 PROCEDURE — 99225 SUBSEQUENT OBSERVATION CARE: CPT | Performed by: INTERNAL MEDICINE

## 2021-06-16 RX ORDER — SENNA AND DOCUSATE SODIUM 50; 8.6 MG/1; MG/1
2 TABLET, FILM COATED ORAL 2 TIMES DAILY PRN
Status: DISCONTINUED | OUTPATIENT
Start: 2021-06-16 | End: 2021-06-19

## 2021-06-16 RX ORDER — LEVOTHYROXINE SODIUM 0.1 MG/1
100 TABLET ORAL
Status: DISCONTINUED | OUTPATIENT
Start: 2021-06-17 | End: 2021-06-17

## 2021-06-16 NOTE — PROGRESS NOTES
2300     Pt. Alert and o x 4 , on RA. Resp. Pattern easy and non labored , denies any pain or discomfort , no SOB. Denies any chest pain or discomfort. Tele shows NSR with frequent PVC's and bigeminy .  Seen and examined by hospitalist , left orders noted a

## 2021-06-16 NOTE — H&P
[facility]  History and Physical     Moy Irwin Patient Status:  Observation    1960 MRN [id number]   [facility] 8NE-A Attending Albina Whalen MD   Hosp Day # 0 PCP Ailyn Miranda MD     Chief Complaint: dizziness, l facility-administered medications on file prior to encounter.   Sennosides-Docusate Sodium (SENNA S) 8.6-50 MG Oral Tab, , Disp: , Rfl:   Rosuvastatin Calcium (CRESTOR) 5 MG Oral Tab, Take 4 tablets (20 mg total) by mouth nightly., Disp: 90 tablet, Rfl: 1 No rashes or lesions. Psychiatric: Appropriate mood and affect. Diagnostic Data:      Labs:  No results for input(s): WBC, HGB, MCV, PLT, BAND, INR in the last 168 hours.     Invalid input(s): LYM#, MONO#, BASOS#, EOSIN#    No results for input(s): G

## 2021-06-16 NOTE — PLAN OF CARE
Assumed care of patient around 0730. Pt a/o x 4, RA, SR w/ BBB and frequent PVCS, HR in 60s-70s. BP stable, negative orthostatic BP. Denies chest pain/discomfort or SOB, denies lightheadedness or dizziness. Patient states she feels much better today.  Await

## 2021-06-16 NOTE — CONSULTS
[facility]  Cardiology Consultation    Topher Lemus Patient Status:  Observation    1960 MRN [id number]   [facility] 8NE-A Attending Lorie Jimenez,    Hosp Day # 0 PCP Dayanna Linder MD     Reason for Consultation: injection 5,000 Units, 5,000 Units, Subcutaneous, Q8H [address]  •  acetaminophen (TYLENOL) tab 650 mg, 650 mg, Oral, Q6H PRN  •  ondansetron HCl (ZOFRAN) injection 4 mg, 4 mg, Intravenous, Q6H PRN  •  Prochlorperazine Edisylate (COMPAZINE) injection 5 mg, 5 mg, pauses    Impression:  Active Problems:    Bradycardia    Cardiomyopathy (HCC)    Dyslipidemia  Frequent PVCs  Near syncope    Recommendations:  -continue ASA 81 mg daily, statin and entresto  -hold BB and monitor telemetry; will need to have Dr. Nava marshall

## 2021-06-17 ENCOUNTER — TELEPHONE (OUTPATIENT)
Dept: FAMILY MEDICINE CLINIC | Facility: CLINIC | Age: 61
End: 2021-06-17

## 2021-06-17 ENCOUNTER — APPOINTMENT (OUTPATIENT)
Dept: CV DIAGNOSTICS | Facility: HOSPITAL | Age: 61
DRG: 227 | End: 2021-06-17
Attending: NURSE PRACTITIONER
Payer: COMMERCIAL

## 2021-06-17 PROCEDURE — 99225 SUBSEQUENT OBSERVATION CARE: CPT | Performed by: INTERNAL MEDICINE

## 2021-06-17 PROCEDURE — 99214 OFFICE O/P EST MOD 30 MIN: CPT | Performed by: INTERNAL MEDICINE

## 2021-06-17 RX ORDER — PANTOPRAZOLE SODIUM 20 MG/1
20 TABLET, DELAYED RELEASE ORAL NIGHTLY
Status: DISCONTINUED | OUTPATIENT
Start: 2021-06-18 | End: 2021-06-17

## 2021-06-17 RX ORDER — PANTOPRAZOLE SODIUM 20 MG/1
20 TABLET, DELAYED RELEASE ORAL NIGHTLY
Status: DISCONTINUED | OUTPATIENT
Start: 2021-06-18 | End: 2021-06-19

## 2021-06-17 RX ORDER — LEVOTHYROXINE SODIUM 0.05 MG/1
50 TABLET ORAL
Status: DISCONTINUED | OUTPATIENT
Start: 2021-06-18 | End: 2021-06-19

## 2021-06-17 NOTE — PROGRESS NOTES
[facility]  Progress Note     Prabhjot Suly Patient Status:  Observation    1960 MRN [id number]   [facility] 8NE-A Attending Eb [place], [address] [facility] Day # 0 PCP Bertin Cooper MD     Chief Complaint: dizziness    S: Patient EWELINA, LDH, DDIMER in the last 168 hours. Imaging: Imaging data reviewed in Epic.     Medications:   • [START ON 6/18/2021] Levothyroxine Sodium  50 mcg Oral Before breakfast   • aspirin  81 mg Oral Daily   • Pantoprazole Sodium  20 mg Oral QAM    • [place]

## 2021-06-17 NOTE — PLAN OF CARE
Alert and o x 4 , not in any form of distress. Tele shows NSR with frequents PVC's and Bigeminy , pt. Asymptomatic. Cont. Monitor per tele/labs/v/s. Meds as ordered. Fall/safety precautions instructed , placed call light w/in reach.   Problem: CA

## 2021-06-17 NOTE — PROGRESS NOTES
Pt not willing to sign consent for pacemaker/icd placement until she verifies that it will be covered under her insurance inpatient. Kaitlyn STALEY notified.

## 2021-06-17 NOTE — PLAN OF CARE
Pt A&Ox4. NSR with BBB and frequent PVC's. Pt denies dizzyness/lightheadedness. Pt has no pain. Lungs are clear on RA. Pt ambulates without difficulty in hallway. Pt waiting for EP to see today. Call light within reach. Will continue to monitor pt.       Pr

## 2021-06-17 NOTE — PROGRESS NOTES
[facility]  Cardiology Progress Note    Subjective:  No chest pain or shortness of breath.     Objective:  /74 (BP Location: Left arm)   Pulse 85   Temp 98 °F (36.7 °C) (Oral)   Resp 18   SpO2 98%     Telemetry: SR w freq PVCs    Physical Exam:

## 2021-06-17 NOTE — TELEPHONE ENCOUNTER
PATIENT CALLING BACK SAID SHE WAS TO HAVING ECHO CARDIOGRAM TODAY,  PATIENT IS WORRIED SHE WILL BE STUCK WITH THIS [address] [facility] STAFF TOLD HER SHE NEEDS TO CALL HER INSURANCE TO GET THIS ALL APPROVED.

## 2021-06-17 NOTE — PROGRESS NOTES
[facility] HOSPITALIST  Progress Note     Topher Lemus Patient Status:  Observation    1960 MRN [id number]   [facility] 8NE-A Attending Lorie Jimenez, [address] [facility] Day # 0 PCP Dayanna Linder MD     Chief Complaint: dizziness    S: Patient input(s): CK in the last 168 hours. Inflammatory Markers  No results for input(s): CRP, EWELINA, LDH, DDIMER in the last 168 hours. Imaging: Imaging data reviewed in Epic.     Medications:   • aspirin  81 mg Oral Daily   • Pantoprazole Sodium  20 mg Oral

## 2021-06-17 NOTE — TELEPHONE ENCOUNTER
Pt reports concerns for insurance coverage of the procedures  recommended for her while she is admitted at [facility].  Pt reports she had angiogram in the past that had to be approved by her insurance first.  Pt reports she thought she was going to s

## 2021-06-18 ENCOUNTER — APPOINTMENT (OUTPATIENT)
Dept: INTERVENTIONAL RADIOLOGY/VASCULAR | Facility: HOSPITAL | Age: 61
DRG: 227 | End: 2021-06-18
Attending: NURSE PRACTITIONER
Payer: COMMERCIAL

## 2021-06-18 ENCOUNTER — APPOINTMENT (OUTPATIENT)
Dept: CV DIAGNOSTICS | Facility: HOSPITAL | Age: 61
DRG: 227 | End: 2021-06-18
Attending: NURSE PRACTITIONER
Payer: COMMERCIAL

## 2021-06-18 PROCEDURE — 93306 TTE W/DOPPLER COMPLETE: CPT | Performed by: NURSE PRACTITIONER

## 2021-06-18 PROCEDURE — B517ZZZ FLUOROSCOPY OF LEFT SUBCLAVIAN VEIN: ICD-10-PCS | Performed by: INTERNAL MEDICINE

## 2021-06-18 PROCEDURE — 02HK3KZ INSERTION OF DEFIBRILLATOR LEAD INTO RIGHT VENTRICLE, PERCUTANEOUS APPROACH: ICD-10-PCS | Performed by: INTERNAL MEDICINE

## 2021-06-18 PROCEDURE — 33225 L VENTRIC PACING LEAD ADD-ON: CPT | Performed by: INTERNAL MEDICINE

## 2021-06-18 PROCEDURE — 02H63KZ INSERTION OF DEFIBRILLATOR LEAD INTO RIGHT ATRIUM, PERCUTANEOUS APPROACH: ICD-10-PCS | Performed by: INTERNAL MEDICINE

## 2021-06-18 PROCEDURE — 33249 INSJ/RPLCMT DEFIB W/LEAD(S): CPT | Performed by: INTERNAL MEDICINE

## 2021-06-18 PROCEDURE — 02HL3KZ INSERTION OF DEFIBRILLATOR LEAD INTO LEFT VENTRICLE, PERCUTANEOUS APPROACH: ICD-10-PCS | Performed by: INTERNAL MEDICINE

## 2021-06-18 PROCEDURE — 99225 SUBSEQUENT OBSERVATION CARE: CPT | Performed by: INTERNAL MEDICINE

## 2021-06-18 PROCEDURE — 0JH609Z INSERTION OF CARDIAC RESYNCHRONIZATION DEFIBRILLATOR PULSE GENERATOR INTO CHEST SUBCUTANEOUS TISSUE AND FASCIA, OPEN APPROACH: ICD-10-PCS | Performed by: INTERNAL MEDICINE

## 2021-06-18 PROCEDURE — B51VZZA FLUOROSCOPY OF OTHER VEINS, GUIDANCE: ICD-10-PCS | Performed by: INTERNAL MEDICINE

## 2021-06-18 RX ORDER — CHLORHEXIDINE GLUCONATE 4 G/100ML
30 SOLUTION TOPICAL
Status: DISCONTINUED | OUTPATIENT
Start: 2021-06-18 | End: 2021-06-18 | Stop reason: HOSPADM

## 2021-06-18 RX ORDER — SODIUM CHLORIDE 9 MG/ML
INJECTION, SOLUTION INTRAVENOUS
Status: DISCONTINUED | OUTPATIENT
Start: 2021-06-18 | End: 2021-06-18 | Stop reason: HOSPADM

## 2021-06-18 RX ORDER — CEFAZOLIN SODIUM/WATER 2 G/20 ML
SYRINGE (ML) INTRAVENOUS
Status: COMPLETED
Start: 2021-06-18 | End: 2021-06-19

## 2021-06-18 RX ORDER — HEPARIN SODIUM 5000 [USP'U]/ML
INJECTION, SOLUTION INTRAVENOUS; SUBCUTANEOUS
Status: COMPLETED
Start: 2021-06-18 | End: 2021-06-18

## 2021-06-18 RX ORDER — LIDOCAINE HYDROCHLORIDE 10 MG/ML
INJECTION, SOLUTION EPIDURAL; INFILTRATION; INTRACAUDAL; PERINEURAL
Status: COMPLETED
Start: 2021-06-18 | End: 2021-06-18

## 2021-06-18 RX ORDER — MIDAZOLAM HYDROCHLORIDE 1 MG/ML
INJECTION INTRAMUSCULAR; INTRAVENOUS
Status: COMPLETED
Start: 2021-06-18 | End: 2021-06-18

## 2021-06-18 RX ORDER — VANCOMYCIN HYDROCHLORIDE 1 G/20ML
INJECTION, POWDER, LYOPHILIZED, FOR SOLUTION INTRAVENOUS
Status: COMPLETED
Start: 2021-06-18 | End: 2021-06-18

## 2021-06-18 RX ORDER — CEFAZOLIN SODIUM/WATER 2 G/20 ML
2 SYRINGE (ML) INTRAVENOUS
Status: DISCONTINUED | OUTPATIENT
Start: 2021-06-18 | End: 2021-06-18 | Stop reason: HOSPADM

## 2021-06-18 NOTE — PROGRESS NOTES
[facility] HOSPITALIST  Progress Note     Rafa Diaz Patient Status:  Observation    1960 MRN [id number]   [facility] 8NE-A Attending Sherrie Boyd, [address] [facility] Day # 0 PCP Dio Hoffman MD     Chief Complaint: dizziness    S: Patient input(s): CRP, EWELINA, LDH, DDIMER in the last 168 hours. Imaging: Imaging data reviewed in Epic.     Medications:   • Levothyroxine Sodium  50 mcg Oral Before breakfast   • Pantoprazole Sodium  20 mg Oral Nightly   • aspirin  81 mg Oral Daily   • Rosuvasta

## 2021-06-18 NOTE — PLAN OF CARE
Problem: Patient/Family Goals  Goal: Patient/Family Long Term Goal  Description: Patient's Long Term Goal: to go home  Interventions:  - comply to medications  - follow care plan  - See additional Care Plan goals for specific interventions  Outcome: Prog

## 2021-06-18 NOTE — PLAN OF CARE
Assumed care at [address]. Pt is A&Ox4. Pt is on RA, w/ sats 99%. NSR on tele w/ BBB and frequent PVCs. Voids, continent of B&B. Denies pain at this time. Pt ambulates up ad anil.  All needs addressed at this time, care plan reviewed with patient, continue to mon baseline  Description: INTERVENTIONS:  - Continuous cardiac monitoring, monitor vital signs, obtain 12 lead EKG if indicated  - Evaluate effectiveness of antiarrhythmic and heart rate control medications as ordered  - Initiate emergency measures for life t

## 2021-06-18 NOTE — CM/SW NOTE
Concern expressed by patient regarding coverage for procedure--provided patient with in network hospital list with [facility] listed as tier 1

## 2021-06-18 NOTE — PAYOR COMM NOTE
-DIRECT ADMIT  ADMISSION REVIEW     Abnernigel Lee PPO  Subscriber #:  [id number]  Authorization Number: N/A    Admit date: OBSERVATION 6/15       Admitting Physician: Adryan Tsang MD  Attending Physician:  Ana Laura Snyder DO  Primary Care Physician that she has never smoked. She has never used smokeless tobacco. She reports current alcohol use. She reports that she does not use drugs.     Family History:   Family History   Problem Relation Age of Onset   • Heart Disorder Father         AAA   • Diabete visit. General: No acute distress. Alert and oriented x 3. HEENT: Normocephalic atraumatic. Moist mucous membranes. EOM-I. PERRLA. Anicteric. Neck: No lymphadenopathy. No JVD. No carotid bruits. Respiratory: Clear to auscultation bilaterally.  No wheeze 28%  1. Continue cardiac meds  4. Dyslipidemia  1. statin  5. GERD  1.  PPI    Quality:  · DVT Prophylaxis: HSQ  · CODE status: full  · Hutton: none  · If COVID testing is negative, may discontinue isolation: yes     Plan of care discussed with patient, ED p severe LV systolic dysfunction and no PHTN. She denies chest pain, dyspnea, orthopnea, PND or LE swelling. She denies palpitations or syncope. She denies fever, chills, nausea/vomiting, diarrhea or urinary complaints.      116/46 (BP Location: Right arm need outpatient follow up for dose titration   3. Dyslipidemia  1. statin  4. GERD  1.  PPI     6/17 CARDIOLOGY NOTE  Subjective:  No chest pain or shortness of breath.     Objective:  /74 (BP Location: Left arm)   Pulse 85   Temp 98 °F (36.7 °C) (Ora patient   3. Dyslipidemia  1. statin  4. GERD  1.  PPI

## 2021-06-18 NOTE — PROCEDURES
OPERATION(S) PERFORMED:   1. BiV ICD implant. 2. Chest fluoroscopy. 3. CS venography. : Danny Hammer MD  INDICATION: CM, left bundle branch block, QRS duration 166 ms, symptomatic bradycardia.     COMPLICATIONS: None     ESTIMATED BLOOD LOSS achieved. The leads were connected to a pulse generator. They were coiled and placed into the pocket along with the pulse generator. The generator was secured to the underlying muscle with ethibond suture.  The incision was closed in 2 layers using 2-0 and

## 2021-06-18 NOTE — PLAN OF CARE
Pt. Is alert and oriented times four. Lungs clear on auscultation. Pt. Is NPO for possible AICD insertion today. Pt. Has concerns as to whether her insurance will cover the procedure. Social workk will look into this.  Pt. Is in sinus rhythm with frequent b

## 2021-06-19 ENCOUNTER — HOSPITAL ENCOUNTER (OUTPATIENT)
Dept: GENERAL RADIOLOGY | Facility: HOSPITAL | Age: 61
Setting detail: OBSERVATION
Discharge: HOME | DRG: 227 | End: 2021-06-19
Attending: INTERNAL MEDICINE | Admitting: INTERNAL MEDICINE
Payer: COMMERCIAL

## 2021-06-19 VITALS
TEMPERATURE: 98 F | OXYGEN SATURATION: 97 % | SYSTOLIC BLOOD PRESSURE: 112 MMHG | DIASTOLIC BLOOD PRESSURE: 56 MMHG | RESPIRATION RATE: 16 BRPM | HEART RATE: 70 BPM

## 2021-06-19 PROCEDURE — 71045 X-RAY EXAM CHEST 1 VIEW: CPT | Performed by: INTERNAL MEDICINE

## 2021-06-19 PROCEDURE — 99214 OFFICE O/P EST MOD 30 MIN: CPT | Performed by: NURSE PRACTITIONER

## 2021-06-19 PROCEDURE — 99217 OBSERVATION CARE DISCHARGE: CPT | Performed by: INTERNAL MEDICINE

## 2021-06-19 RX ORDER — METOPROLOL SUCCINATE 50 MG/1
50 TABLET, EXTENDED RELEASE ORAL
Status: DISCONTINUED | OUTPATIENT
Start: 2021-06-19 | End: 2021-06-19

## 2021-06-19 RX ORDER — LEVOTHYROXINE SODIUM 0.05 MG/1
50 TABLET ORAL
Qty: 30 TABLET | Refills: 0 | Status: SHIPPED | OUTPATIENT
Start: 2021-06-20 | End: 2021-07-17

## 2021-06-19 RX ORDER — ACETAMINOPHEN 325 MG/1
650 TABLET ORAL EVERY 4 HOURS PRN
Status: DISCONTINUED | OUTPATIENT
Start: 2021-06-19 | End: 2021-06-19

## 2021-06-19 RX ORDER — CEFAZOLIN SODIUM/WATER 2 G/20 ML
2 SYRINGE (ML) INTRAVENOUS EVERY 8 HOURS
Status: COMPLETED | OUTPATIENT
Start: 2021-06-19 | End: 2021-06-19

## 2021-06-19 RX ORDER — ONDANSETRON 2 MG/ML
4 INJECTION INTRAMUSCULAR; INTRAVENOUS EVERY 6 HOURS PRN
Status: DISCONTINUED | OUTPATIENT
Start: 2021-06-19 | End: 2021-06-19

## 2021-06-19 RX ORDER — METOPROLOL SUCCINATE 50 MG/1
50 TABLET, EXTENDED RELEASE ORAL DAILY
Qty: 30 TABLET | Refills: 3 | Status: SHIPPED | OUTPATIENT
Start: 2021-06-19 | End: 2021-10-25

## 2021-06-19 RX ORDER — ACETAMINOPHEN AND CODEINE PHOSPHATE 300; 30 MG/1; MG/1
1 TABLET ORAL EVERY 4 HOURS PRN
Status: DISCONTINUED | OUTPATIENT
Start: 2021-06-19 | End: 2021-06-19

## 2021-06-19 RX ORDER — ACETAMINOPHEN AND CODEINE PHOSPHATE 300; 30 MG/1; MG/1
2 TABLET ORAL EVERY 4 HOURS PRN
Status: DISCONTINUED | OUTPATIENT
Start: 2021-06-19 | End: 2021-06-19

## 2021-06-19 NOTE — PLAN OF CARE
Assumed care of pt at 0700. Pt A&Ox4. AV paced with frequent PVCs on tele. 5 beats asymptomatic vtach this AM, MD notified. Metoprolol 50mg re-ordered per orders. Lung sounds clear and diminished bilaterally on room air. Pt denies SOB.  Verbalized headache measures for life threatening arrhythmias  - Monitor electrolytes and administer replacement therapy as ordered  Outcome: Progressing

## 2021-06-19 NOTE — DISCHARGE SUMMARY
[facility] HOSPITALIST  DISCHARGE SUMMARY     Brenda Márquez Patient Status:  Observation    1960 MRN [id number]   [facility] 8NE-A Attending Pamela Magdaleno, DO   Hosp Day # 0 PCP Octavia Azar MD     Date of Admission: 6/15/2021  Date of Patient is admitted, evaluated by cardiology, referred to EP, underwent BiV ICD implant, no complications, patient being discharged home in stable condition. Procedures during hospitalization:   . BiV ICD implant.     Incidental or significant findings a ()/(34-70) 112/56    Physical Exam:    General: No acute distress. Respiratory: Clear to auscultation bilaterally. No wheezes. No rhonchi. Cardiovascular: S1, S2. Regular rate and rhythm. No murmurs, rubs or gallops.    Abdomen: Soft, nontender, no

## 2021-06-19 NOTE — DISCHARGE PLANNING
D/C orders received. IV removed, IV catheter intact. Follow up appointments discussed. New meds prescribed to pharmacy. Discharge paperwork given and discussed. Patient taken to [facility] in wheelchair via transport.

## 2021-06-19 NOTE — PLAN OF CARE
Patient's blood pressure down to 65 systolic. Luz STALEY notified. Orders received to give 500 ml bolus of fluid.

## 2021-06-19 NOTE — PLAN OF CARE
Received patient at 0730. Patient A/O x 4. Tele Rhythm AV paced. S/p pacemaker. Sling is placed. Dressing on Left upper chest C/D/I. No signs of bleeding, infection, redness. No c/o of pain.  Patient gets dizzy spells at times, laying down seems to hel Continuous cardiac monitoring, monitor vital signs, obtain 12 lead EKG if indicated  - Evaluate effectiveness of antiarrhythmic and heart rate control medications as ordered  - Initiate emergency measures for life threatening arrhythmias  - Monitor electro

## 2021-06-19 NOTE — PROGRESS NOTES
[facility]  Cardiology Progress Note    Subjective:  No chest pain or shortness of breath.     Objective:  /56 (BP Location: Right arm)   Pulse 70   Temp 97.8 °F (36.6 °C) (Oral)   Resp 16   SpO2 97%     Telemetry: paced with frequent PVCs    Phy

## 2021-06-19 NOTE — PLAN OF CARE
LUÍS Jean-Baptiste notified of blood pressure still 80/49.  Orders received to give another 250 ml bolus of fluid

## 2021-06-21 ENCOUNTER — PATIENT OUTREACH (OUTPATIENT)
Dept: CASE MANAGEMENT | Age: 61
End: 2021-06-21

## 2021-06-21 ENCOUNTER — OFF PREMISE (OUTPATIENT)
Dept: CARDIOLOGY | Age: 61
End: 2021-06-21

## 2021-06-21 DIAGNOSIS — Z02.9 ENCOUNTERS FOR UNSPECIFIED ADMINISTRATIVE PURPOSE: ICD-10-CM

## 2021-06-21 NOTE — PAYOR COMM NOTE
--------------  CONTINUED STAY REVIEW    Wengrecia Caraballo PPO  Subscriber #:  [id number]  Authorization Number: N/A    Admit date: N/A  Admit time: N/A    Admitting Physician: Kristyn Hairston MD  Attending Physician:  No att. providers found  Primary Ca Contacted patient and informed of results of pap smear which revealed:    Negative for intraepithelial lesion or malignancy HPV-      Informed patient. NO further questions or concerns.

## 2021-06-21 NOTE — PROGRESS NOTES
KATHYRICHARD for post hospital follow up. [facility] contact information provided as well as [facility] office number, [phone].

## 2021-06-21 NOTE — PAYOR COMM NOTE
--------------  DISCHARGE REVIEW    Davidrosalva Knight PP  Subscriber #:  [id number]  Authorization Number: N/A    Admit date: OBSERVATION  Discharge Date: 6/19/2021  1:17 PM     Admitting Physician: Neymar Vigil MD  Attending Physician:  Karlee att.  prov Cardiology and underwent cardiac cath with reduced EF but normal coronaries. Plan was to continue medical management aimed at LV dysfunction. The thought was that she may have had viral infection that caused her EF to be reduced.  Today she went to immdiate nightly. Quantity: 90 tablet  Refills: 1     Sacubitril-Valsartan 24-26 MG Tabs  Commonly known as: ENTRESTO      Take 1 tablet by mouth 2 (two) times daily.    Quantity: 60 tablet  Refills: 2     Senna S 8.6-50 MG Tabs  Generic drug: Sennosides-Docusate

## 2021-06-22 ENCOUNTER — OFFICE VISIT (OUTPATIENT)
Dept: FAMILY MEDICINE CLINIC | Facility: CLINIC | Age: 61
End: 2021-06-22
Payer: COMMERCIAL

## 2021-06-22 ENCOUNTER — TELEPHONE (OUTPATIENT)
Dept: CARDIOLOGY | Age: 61
End: 2021-06-22

## 2021-06-22 VITALS
RESPIRATION RATE: 16 BRPM | DIASTOLIC BLOOD PRESSURE: 60 MMHG | WEIGHT: 137 LBS | HEART RATE: 60 BPM | OXYGEN SATURATION: 98 % | SYSTOLIC BLOOD PRESSURE: 110 MMHG | BODY MASS INDEX: 25.21 KG/M2 | HEIGHT: 62 IN | TEMPERATURE: 98 F

## 2021-06-22 DIAGNOSIS — E03.9 ACQUIRED HYPOTHYROIDISM: ICD-10-CM

## 2021-06-22 DIAGNOSIS — R93.1 HIGH CORONARY ARTERY CALCIUM SCORE: ICD-10-CM

## 2021-06-22 DIAGNOSIS — I42.9 CARDIOMYOPATHY, UNSPECIFIED TYPE (HCC): Primary | ICD-10-CM

## 2021-06-22 DIAGNOSIS — Z95.810 S/P ICD (INTERNAL CARDIAC DEFIBRILLATOR) PROCEDURE: ICD-10-CM

## 2021-06-22 DIAGNOSIS — Z95.0 S/P PLACEMENT OF CARDIAC PACEMAKER: ICD-10-CM

## 2021-06-22 DIAGNOSIS — E78.00 PURE HYPERCHOLESTEROLEMIA: ICD-10-CM

## 2021-06-22 DIAGNOSIS — E78.5 DYSLIPIDEMIA: ICD-10-CM

## 2021-06-22 DIAGNOSIS — E78.1 HYPERTRIGLYCERIDEMIA: ICD-10-CM

## 2021-06-22 DIAGNOSIS — I51.9 LV DYSFUNCTION: ICD-10-CM

## 2021-06-22 DIAGNOSIS — R00.1 BRADYCARDIA: ICD-10-CM

## 2021-06-22 PROCEDURE — 99214 OFFICE O/P EST MOD 30 MIN: CPT | Performed by: FAMILY MEDICINE

## 2021-06-22 PROCEDURE — 3078F DIAST BP <80 MM HG: CPT | Performed by: FAMILY MEDICINE

## 2021-06-22 PROCEDURE — 3074F SYST BP LT 130 MM HG: CPT | Performed by: FAMILY MEDICINE

## 2021-06-22 PROCEDURE — 3008F BODY MASS INDEX DOCD: CPT | Performed by: FAMILY MEDICINE

## 2021-06-22 RX ORDER — SACUBITRIL AND VALSARTAN 24; 26 MG/1; MG/1
1 TABLET, FILM COATED ORAL 2 TIMES DAILY
COMMUNITY
Start: 2021-06-02 | End: 2021-06-23 | Stop reason: DRUGHIGH

## 2021-06-22 RX ORDER — ASPIRIN 81 MG/1
81 TABLET ORAL DAILY
COMMUNITY

## 2021-06-22 RX ORDER — LEVOTHYROXINE SODIUM 0.05 MG/1
TABLET ORAL
COMMUNITY
Start: 2021-06-19

## 2021-06-22 RX ORDER — ESOMEPRAZOLE MAGNESIUM 20 MG/1
TABLET, DELAYED RELEASE ORAL
COMMUNITY
Start: 2021-05-03

## 2021-06-22 NOTE — PROGRESS NOTES
Marie Chakraborty is a 56year old female. HPI:   Patient following up from hospital stay;   Discharge summary:    Date of Admission: 6/15/2021  Date of Discharge: 6/19/2021  Discharge Disposition: Home or Self Care     Admitting Diagnosis:   Symptomatic Bra complications, patient being discharged home in stable condition.     Procedures during hospitalization:   . BiV ICD implant.     Incidental or significant findings and recommendations (brief descriptions):   · Hypothyroidism - see above     Lab/Test result II   • Hypertension Mother    • Other (Other) Mother         thyroid   • Cancer Maternal Grandfather         throat   • Other (Other) Sister         thyroid   • Diabetes Brother         adult onset, no complications      Social History    Tobacco Use

## 2021-06-22 NOTE — PROGRESS NOTES
[facility] planned to contact patient for TCM, however, TCM-Hospital FU appointment was completed on 6/22/2021. [facility] closing encounter.

## 2021-06-22 NOTE — PROGRESS NOTES
ESE called PCP office and s/w Lala regarding visit type of today's appt and length. She states that she will speak with Dr. Rashawn Menchaca and will change visit per PCP recommendation.

## 2021-06-23 ENCOUNTER — ANCILLARY PROCEDURE (OUTPATIENT)
Dept: CARDIOLOGY | Age: 61
End: 2021-06-23
Attending: INTERNAL MEDICINE

## 2021-06-23 ENCOUNTER — OFFICE VISIT (OUTPATIENT)
Dept: CARDIOLOGY | Age: 61
End: 2021-06-23

## 2021-06-23 VITALS — HEART RATE: 70 BPM | DIASTOLIC BLOOD PRESSURE: 70 MMHG | SYSTOLIC BLOOD PRESSURE: 110 MMHG

## 2021-06-23 VITALS — SYSTOLIC BLOOD PRESSURE: 110 MMHG | DIASTOLIC BLOOD PRESSURE: 70 MMHG | WEIGHT: 135 LBS

## 2021-06-23 DIAGNOSIS — R06.09 DYSPNEA ON EXERTION: ICD-10-CM

## 2021-06-23 DIAGNOSIS — I50.9 CHRONIC CONGESTIVE HEART FAILURE, UNSPECIFIED HEART FAILURE TYPE (CMD): Primary | ICD-10-CM

## 2021-06-23 DIAGNOSIS — Z45.018 PACEMAKER REPROGRAMMING/CHECK: ICD-10-CM

## 2021-06-23 DIAGNOSIS — E78.00 PURE HYPERCHOLESTEROLEMIA: ICD-10-CM

## 2021-06-23 DIAGNOSIS — I42.9 CARDIOMYOPATHY, UNSPECIFIED TYPE (CMD): ICD-10-CM

## 2021-06-23 DIAGNOSIS — Z95.810 ICD (IMPLANTABLE CARDIOVERTER-DEFIBRILLATOR) IN PLACE: Primary | ICD-10-CM

## 2021-06-23 PROCEDURE — 99024 POSTOP FOLLOW-UP VISIT: CPT | Performed by: NURSE PRACTITIONER

## 2021-06-23 PROCEDURE — 93284 PRGRMG EVAL IMPLANTABLE DFB: CPT | Performed by: INTERNAL MEDICINE

## 2021-06-23 RX ORDER — ROSUVASTATIN CALCIUM 20 MG/1
20 TABLET, COATED ORAL DAILY
Qty: 30 TABLET | Refills: 6 | Status: SHIPPED | OUTPATIENT
Start: 2021-06-23

## 2021-06-23 RX ORDER — SPIRONOLACTONE 25 MG/1
12.5 TABLET ORAL DAILY
Qty: 15 TABLET | Refills: 3 | Status: SHIPPED | OUTPATIENT
Start: 2021-06-23

## 2021-06-23 RX ORDER — SACUBITRIL AND VALSARTAN 49; 51 MG/1; MG/1
1 TABLET, FILM COATED ORAL 2 TIMES DAILY
Qty: 60 TABLET | Refills: 3 | Status: SHIPPED | OUTPATIENT
Start: 2021-06-23

## 2021-06-23 RX ORDER — METOPROLOL SUCCINATE 50 MG/1
50 TABLET, EXTENDED RELEASE ORAL AT BEDTIME
Status: SHIPPED | COMMUNITY
Start: 2021-06-23

## 2021-06-23 ASSESSMENT — PATIENT HEALTH QUESTIONNAIRE - PHQ9
1. LITTLE INTEREST OR PLEASURE IN DOING THINGS: NOT AT ALL
2. FEELING DOWN, DEPRESSED OR HOPELESS: NOT AT ALL
CLINICAL INTERPRETATION OF PHQ2 SCORE: NO FURTHER SCREENING NEEDED
SUM OF ALL RESPONSES TO PHQ9 QUESTIONS 1 AND 2: 0
SUM OF ALL RESPONSES TO PHQ9 QUESTIONS 1 AND 2: 0
CLINICAL INTERPRETATION OF PHQ9 SCORE: NO FURTHER SCREENING NEEDED

## 2021-06-24 ENCOUNTER — TELEPHONE (OUTPATIENT)
Dept: CARDIOLOGY | Age: 61
End: 2021-06-24

## 2021-06-29 ENCOUNTER — APPOINTMENT (OUTPATIENT)
Dept: CARDIAC REHAB | Facility: HOSPITAL | Age: 61
End: 2021-06-29
Attending: INTERNAL MEDICINE
Payer: COMMERCIAL

## 2021-06-30 ENCOUNTER — OFFICE VISIT (OUTPATIENT)
Dept: SLEEP CENTER | Age: 61
End: 2021-06-30
Attending: Other
Payer: COMMERCIAL

## 2021-06-30 DIAGNOSIS — R06.83 SNORING: ICD-10-CM

## 2021-06-30 DIAGNOSIS — I51.9 LV DYSFUNCTION: ICD-10-CM

## 2021-06-30 DIAGNOSIS — R94.30 EJECTION FRACTION < 50%: ICD-10-CM

## 2021-06-30 PROBLEM — Z95.0 S/P PLACEMENT OF CARDIAC PACEMAKER: Status: ACTIVE | Noted: 2021-06-30

## 2021-06-30 PROBLEM — I50.9 CHRONIC CONGESTIVE HEART FAILURE (CMD): Status: ACTIVE | Noted: 2021-06-30

## 2021-06-30 PROBLEM — Z95.810 S/P ICD (INTERNAL CARDIAC DEFIBRILLATOR) PROCEDURE: Status: ACTIVE | Noted: 2021-06-30

## 2021-06-30 PROCEDURE — 95810 POLYSOM 6/> YRS 4/> PARAM: CPT

## 2021-07-08 ENCOUNTER — TELEPHONE (OUTPATIENT)
Dept: FAMILY MEDICINE CLINIC | Facility: CLINIC | Age: 61
End: 2021-07-08

## 2021-07-08 ENCOUNTER — NURSE ONLY (OUTPATIENT)
Dept: FAMILY MEDICINE CLINIC | Facility: CLINIC | Age: 61
End: 2021-07-08
Payer: COMMERCIAL

## 2021-07-08 DIAGNOSIS — E78.00 PURE HYPERCHOLESTEROLEMIA: Primary | ICD-10-CM

## 2021-07-08 DIAGNOSIS — I49.3 PVC'S (PREMATURE VENTRICULAR CONTRACTIONS): ICD-10-CM

## 2021-07-08 DIAGNOSIS — Z01.818 PREOP EXAMINATION: ICD-10-CM

## 2021-07-08 DIAGNOSIS — R94.39 ABNORMAL NUCLEAR STRESS TEST: ICD-10-CM

## 2021-07-08 DIAGNOSIS — R93.1 HIGH CORONARY ARTERY CALCIUM SCORE: ICD-10-CM

## 2021-07-08 LAB
ANION GAP SERPL CALC-SCNC: 6 MMOL/L (ref 0–18)
BASOPHILS # BLD AUTO: 0.07 X10(3) UL (ref 0–0.2)
BASOPHILS NFR BLD AUTO: 1 %
BUN BLD-MCNC: 17 MG/DL (ref 7–18)
BUN/CREAT SERPL: 23 (ref 10–20)
CALCIUM BLD-MCNC: 9.7 MG/DL (ref 8.5–10.1)
CHLORIDE SERPL-SCNC: 100 MMOL/L (ref 98–112)
CO2 SERPL-SCNC: 26 MMOL/L (ref 21–32)
CREAT BLD-MCNC: 0.74 MG/DL
DEPRECATED RDW RBC AUTO: 41.9 FL (ref 35.1–46.3)
EOSINOPHIL # BLD AUTO: 0.37 X10(3) UL (ref 0–0.7)
EOSINOPHIL NFR BLD AUTO: 5 %
ERYTHROCYTE [DISTWIDTH] IN BLOOD BY AUTOMATED COUNT: 12 % (ref 11–15)
GLUCOSE BLD-MCNC: 104 MG/DL (ref 70–99)
HCT VFR BLD AUTO: 40.6 %
HGB BLD-MCNC: 13.2 G/DL
IMM GRANULOCYTES # BLD AUTO: 0.02 X10(3) UL (ref 0–1)
IMM GRANULOCYTES NFR BLD: 0.3 %
LYMPHOCYTES # BLD AUTO: 2.16 X10(3) UL (ref 1–4)
LYMPHOCYTES NFR BLD AUTO: 29.4 %
MCH RBC QN AUTO: 30.3 PG (ref 26–34)
MCHC RBC AUTO-ENTMCNC: 32.5 G/DL (ref 31–37)
MCV RBC AUTO: 93.1 FL
MONOCYTES # BLD AUTO: 0.5 X10(3) UL (ref 0.1–1)
MONOCYTES NFR BLD AUTO: 6.8 %
NEUTROPHILS # BLD AUTO: 4.22 X10 (3) UL (ref 1.5–7.7)
NEUTROPHILS # BLD AUTO: 4.22 X10(3) UL (ref 1.5–7.7)
NEUTROPHILS NFR BLD AUTO: 57.5 %
NT-PROBNP SERPL-MCNC: 1298 PG/ML (ref ?–125)
OSMOLALITY SERPL CALC.SUM OF ELEC: 276 MOSM/KG (ref 275–295)
PATIENT FASTING Y/N/NP: NO
PLATELET # BLD AUTO: 308 10(3)UL (ref 150–450)
POTASSIUM SERPL-SCNC: 5.3 MMOL/L (ref 3.5–5.1)
RBC # BLD AUTO: 4.36 X10(6)UL
SODIUM SERPL-SCNC: 132 MMOL/L (ref 136–145)
WBC # BLD AUTO: 7.3 X10(3) UL (ref 4–11)

## 2021-07-08 PROCEDURE — 85025 COMPLETE CBC W/AUTO DIFF WBC: CPT | Performed by: FAMILY MEDICINE

## 2021-07-08 PROCEDURE — 80048 BASIC METABOLIC PNL TOTAL CA: CPT | Performed by: FAMILY MEDICINE

## 2021-07-08 NOTE — TELEPHONE ENCOUNTER
Patient to clinic for labs per [address] and Dr Amaury Mora  Patient has written order for BNP from cardiology. Order placed (copy to scan)  Mint and lavender tubes drawn right medial AC x 1 attempt.     Patient requesting BP checked against home machine  Patient has

## 2021-07-08 NOTE — PROGRESS NOTES
Patient to clinic for labs per [facility] JACKIE and Dr Lynda Elder  Patient has written order for BNP from cardiology. Order placed (copy to scan)  Mint and lavender tubes drawn right medial AC x 1 attempt.     Patient requesting BP checked against home machine  Patient has

## 2021-07-17 RX ORDER — LEVOTHYROXINE SODIUM 0.05 MG/1
50 TABLET ORAL
Qty: 30 TABLET | Refills: 0 | Status: SHIPPED | OUTPATIENT
Start: 2021-07-17 | End: 2021-08-16

## 2021-07-17 NOTE — TELEPHONE ENCOUNTER
Medication pended with correct pharmacy  Last refilled 6/20/21 for #30 with 0 RF  LOV with [address] 6/22/21  Future appt with [address] 7/24/21  Last labs 6/16/21  TSH was 11.8  Routed to CP to advise, pt has 1 pill left    Thyroid Supplements Protocol Donock3007/17/2021

## 2021-07-17 NOTE — TELEPHONE ENCOUNTER
Spoke to pt advising that [facility] Nicole sent refill request.  No further questions and verbally understood.

## 2021-07-17 NOTE — TELEPHONE ENCOUNTER
PT WOULD LIKE REFILL, ONLY ONE TAB LEFT:    Levothyroxine Sodium 50 MCG Oral Tab    [facility] #[id number] [address] [place], [address], [phone], [address]

## 2021-07-24 ENCOUNTER — OFFICE VISIT (OUTPATIENT)
Dept: FAMILY MEDICINE CLINIC | Facility: CLINIC | Age: 61
End: 2021-07-24
Payer: COMMERCIAL

## 2021-07-24 VITALS
WEIGHT: 139.63 LBS | BODY MASS INDEX: 26 KG/M2 | SYSTOLIC BLOOD PRESSURE: 100 MMHG | DIASTOLIC BLOOD PRESSURE: 60 MMHG | RESPIRATION RATE: 16 BRPM | TEMPERATURE: 98 F | HEART RATE: 64 BPM

## 2021-07-24 DIAGNOSIS — Z95.0 S/P PLACEMENT OF CARDIAC PACEMAKER: ICD-10-CM

## 2021-07-24 DIAGNOSIS — G47.30 SEVERE SLEEP APNEA: Primary | ICD-10-CM

## 2021-07-24 DIAGNOSIS — Z95.810 S/P ICD (INTERNAL CARDIAC DEFIBRILLATOR) PROCEDURE: ICD-10-CM

## 2021-07-24 DIAGNOSIS — E87.5 HYPERKALEMIA: ICD-10-CM

## 2021-07-24 DIAGNOSIS — F43.22 ADJUSTMENT DISORDER WITH ANXIOUS MOOD: ICD-10-CM

## 2021-07-24 DIAGNOSIS — E03.9 ACQUIRED HYPOTHYROIDISM: ICD-10-CM

## 2021-07-24 DIAGNOSIS — E87.1 HYPONATREMIA: ICD-10-CM

## 2021-07-24 DIAGNOSIS — Z23 NEED FOR VACCINATION: ICD-10-CM

## 2021-07-24 DIAGNOSIS — I42.9 CARDIOMYOPATHY, UNSPECIFIED TYPE (HCC): ICD-10-CM

## 2021-07-24 PROBLEM — I50.9 CHRONIC CONGESTIVE HEART FAILURE (HCC): Status: ACTIVE | Noted: 2021-06-30

## 2021-07-24 PROCEDURE — 3078F DIAST BP <80 MM HG: CPT | Performed by: FAMILY MEDICINE

## 2021-07-24 PROCEDURE — 99215 OFFICE O/P EST HI 40 MIN: CPT | Performed by: FAMILY MEDICINE

## 2021-07-24 PROCEDURE — 3074F SYST BP LT 130 MM HG: CPT | Performed by: FAMILY MEDICINE

## 2021-07-24 RX ORDER — CLONAZEPAM 0.5 MG/1
TABLET ORAL 2 TIMES DAILY PRN
Qty: 20 TABLET | Refills: 0 | Status: SHIPPED | OUTPATIENT
Start: 2021-07-24 | End: 2021-09-01

## 2021-07-24 RX ORDER — ROSUVASTATIN CALCIUM 40 MG/1
1 TABLET, COATED ORAL DAILY
COMMUNITY
Start: 2021-06-19

## 2021-07-24 RX ORDER — SACUBITRIL AND VALSARTAN 49; 51 MG/1; MG/1
1 TABLET, FILM COATED ORAL 2 TIMES DAILY
COMMUNITY
Start: 2021-06-23

## 2021-07-24 NOTE — PROGRESS NOTES
Arturo Venegas is a 56year old female. HPI:   Patient here to review her recent health changes and make sure she's doing everything she's supposed to. She received lab orders from her cards office to do in a month to follow-up on BNP, cmp, lipid.   Wo history.    Family History   Problem Relation Age of Onset   • Heart Disorder Father         AAA   • Diabetes Mother 80        II   • Hypertension Mother    • Other (Other) Mother         thyroid   • Cancer Maternal Grandfather         throat   • Other (Oth procedure  and  S/P placement of cardiac pacemaker  -no new/concerning symptoms; cont f/u with cards  Adjustment disorder with anxious mood  -I discussed with the patient the use of benzodiazepines, it's sedating nature.  This medication is potentially rupa

## 2021-08-07 ENCOUNTER — TELEPHONE (OUTPATIENT)
Dept: FAMILY MEDICINE CLINIC | Facility: CLINIC | Age: 61
End: 2021-08-07

## 2021-08-12 ENCOUNTER — NURSE ONLY (OUTPATIENT)
Dept: FAMILY MEDICINE CLINIC | Facility: CLINIC | Age: 61
End: 2021-08-12
Payer: COMMERCIAL

## 2021-08-12 DIAGNOSIS — E03.9 ACQUIRED HYPOTHYROIDISM: ICD-10-CM

## 2021-08-12 LAB
ANION GAP SERPL CALC-SCNC: 5 MMOL/L (ref 0–18)
BUN BLD-MCNC: 16 MG/DL (ref 7–18)
CALCIUM BLD-MCNC: 9.5 MG/DL (ref 8.5–10.1)
CHLORIDE SERPL-SCNC: 104 MMOL/L (ref 98–112)
CO2 SERPL-SCNC: 26 MMOL/L (ref 21–32)
CREAT BLD-MCNC: 0.81 MG/DL
GLUCOSE BLD-MCNC: 97 MG/DL (ref 70–99)
OSMOLALITY SERPL CALC.SUM OF ELEC: 281 MOSM/KG (ref 275–295)
PATIENT FASTING Y/N/NP: NO
POTASSIUM SERPL-SCNC: 5.3 MMOL/L (ref 3.5–5.1)
SODIUM SERPL-SCNC: 135 MMOL/L (ref 136–145)
TSI SER-ACNC: 1.15 MIU/ML (ref 0.36–3.74)

## 2021-08-12 PROCEDURE — 90471 IMMUNIZATION ADMIN: CPT | Performed by: FAMILY MEDICINE

## 2021-08-12 PROCEDURE — 80048 BASIC METABOLIC PNL TOTAL CA: CPT | Performed by: FAMILY MEDICINE

## 2021-08-12 PROCEDURE — 84443 ASSAY THYROID STIM HORMONE: CPT | Performed by: FAMILY MEDICINE

## 2021-08-12 PROCEDURE — 90732 PPSV23 VACC 2 YRS+ SUBQ/IM: CPT | Performed by: FAMILY MEDICINE

## 2021-08-12 NOTE — PROGRESS NOTES
Topher Lemus present in office for nurse visit.     Pt reports she has labs from cardiology to be drawn  Reviewed pt's chart, attempted to contact Dr. Yogi Jaffe office to clarify - unable to get answers at this time    Pt advised to follow-up with cardiol

## 2021-08-16 RX ORDER — LEVOTHYROXINE SODIUM 0.05 MG/1
50 TABLET ORAL
Qty: 30 TABLET | Refills: 0 | Status: SHIPPED | OUTPATIENT
Start: 2021-08-16 | End: 2021-09-14

## 2021-08-16 NOTE — TELEPHONE ENCOUNTER
LOV 07/24/2021  Last labs 08/12/2021  Last refill on 07/17/2021, for #30 tabs, with 0 refills  Levothyroxine Sodium 50 MCG Oral Tab    Thyroid Supplements Protocol Wvuznc0008/16/2021 07:24 AM   TSH test in past 12 months Protocol Details    TSH value between

## 2021-08-19 ENCOUNTER — TELEPHONE (OUTPATIENT)
Dept: FAMILY MEDICINE CLINIC | Facility: CLINIC | Age: 61
End: 2021-08-19

## 2021-08-19 NOTE — TELEPHONE ENCOUNTER
I see the thyroid tests need to be repeated 2/22. Does she need anything else?        Dr. Jasmin Bartlett please eval and treat for severe ARELI  [facility] ([facility])  [address], [address] [place] [address] Rd  [phone]

## 2021-08-19 NOTE — TELEPHONE ENCOUNTER
She called back, she is under the impression that an order for the c-pap titration has been placed by the pulmonary doctor. She thinks that her PCP has to do it for the insurance.   She will check back with the sleep center and get back to us if she needs a

## 2021-08-19 NOTE — TELEPHONE ENCOUNTER
Correct, the thyroid in Feb, o/w UTD except we're still figuring out her sodium and potassium and I Don't think she saw the my chart message I sent her about her labs last week:    \"Your thyroid is nicely in range.   Continue your current dose, recheck in

## 2021-08-19 NOTE — TELEPHONE ENCOUNTER
Patient called requesting a call back    RE: please fax sleep study order to:   fax# [phone]    Wants to know if she is up to date on her labs.     Please call back #[phone]

## 2021-08-19 NOTE — TELEPHONE ENCOUNTER
Advised on voicemail ad Dealstreethart message sent. She has an appointment with cardiology next week.

## 2021-08-20 ENCOUNTER — ORDER TRANSCRIPTION (OUTPATIENT)
Dept: SLEEP CENTER | Age: 61
End: 2021-08-20

## 2021-08-20 DIAGNOSIS — Z01.818 PREOP EXAMINATION: Primary | ICD-10-CM

## 2021-08-20 DIAGNOSIS — G47.33 OSA (OBSTRUCTIVE SLEEP APNEA): Primary | ICD-10-CM

## 2021-08-20 DIAGNOSIS — Z11.59 SCREENING FOR VIRAL DISEASE: ICD-10-CM

## 2021-09-01 RX ORDER — CLONAZEPAM 0.5 MG/1
TABLET ORAL 2 TIMES DAILY PRN
Qty: 20 TABLET | Refills: 0 | Status: SHIPPED | OUTPATIENT
Start: 2021-09-01 | End: 2021-10-21

## 2021-09-01 NOTE — TELEPHONE ENCOUNTER
Patient called requesting a refill for:  clonazePAM 0.5 MG Oral Tab 20 tablet     [facility] [address] [facility], [address], [phone], [phone]    Please advise # [phone]

## 2021-09-07 ENCOUNTER — LAB ENCOUNTER (OUTPATIENT)
Dept: LAB | Age: 61
End: 2021-09-07
Attending: Other
Payer: COMMERCIAL

## 2021-09-07 DIAGNOSIS — Z11.59 SCREENING FOR VIRAL DISEASE: ICD-10-CM

## 2021-09-07 DIAGNOSIS — Z01.818 PREOP EXAMINATION: ICD-10-CM

## 2021-09-08 LAB — SARS-COV-2 RNA RESP QL NAA+PROBE: DETECTED

## 2021-09-14 RX ORDER — LEVOTHYROXINE SODIUM 0.05 MG/1
50 TABLET ORAL
Qty: 30 TABLET | Refills: 0 | Status: SHIPPED | OUTPATIENT
Start: 2021-09-14 | End: 2021-10-06

## 2021-09-14 NOTE — TELEPHONE ENCOUNTER
Last refilled on 08/16/2021 for # 30 with 0 rf. Last labs 08/12/2021. Last seen on 07/24/2021. Thank you.

## 2021-09-22 ENCOUNTER — LAB ENCOUNTER (OUTPATIENT)
Dept: LAB | Age: 61
End: 2021-09-22
Attending: NURSE PRACTITIONER
Payer: COMMERCIAL

## 2021-09-22 DIAGNOSIS — I49.9 ARRHYTHMIA: ICD-10-CM

## 2021-09-22 DIAGNOSIS — Z45.018 PACEMAKER REPROGRAMMING/CHECK: ICD-10-CM

## 2021-09-22 DIAGNOSIS — I25.10 CORONARY ATHEROSCLEROSIS OF NATIVE CORONARY ARTERY: Primary | ICD-10-CM

## 2021-09-22 DIAGNOSIS — E78.00 PURE HYPERCHOLESTEROLEMIA: ICD-10-CM

## 2021-09-22 DIAGNOSIS — Z95.810 CARDIAC DEFIBRILLATOR IN PLACE: ICD-10-CM

## 2021-09-22 LAB
ANION GAP SERPL CALC-SCNC: 4 MMOL/L (ref 0–18)
BUN BLD-MCNC: 12 MG/DL (ref 7–18)
CALCIUM BLD-MCNC: 10 MG/DL (ref 8.5–10.1)
CHLORIDE SERPL-SCNC: 103 MMOL/L (ref 98–112)
CO2 SERPL-SCNC: 29 MMOL/L (ref 21–32)
CREAT BLD-MCNC: 0.97 MG/DL
GLUCOSE BLD-MCNC: 106 MG/DL (ref 70–99)
NT-PROBNP SERPL-MCNC: 966 PG/ML (ref ?–125)
OSMOLALITY SERPL CALC.SUM OF ELEC: 282 MOSM/KG (ref 275–295)
PATIENT FASTING Y/N/NP: YES
POTASSIUM SERPL-SCNC: 4.6 MMOL/L (ref 3.5–5.1)
SODIUM SERPL-SCNC: 136 MMOL/L (ref 136–145)

## 2021-09-22 PROCEDURE — 36415 COLL VENOUS BLD VENIPUNCTURE: CPT

## 2021-09-22 PROCEDURE — 83880 ASSAY OF NATRIURETIC PEPTIDE: CPT

## 2021-09-22 PROCEDURE — 80048 BASIC METABOLIC PNL TOTAL CA: CPT

## 2021-09-24 ENCOUNTER — APPOINTMENT (OUTPATIENT)
Dept: CARDIOLOGY | Age: 61
End: 2021-09-24
Attending: INTERNAL MEDICINE

## 2021-10-06 RX ORDER — LEVOTHYROXINE SODIUM 0.05 MG/1
TABLET ORAL
Qty: 30 TABLET | Refills: 0 | Status: SHIPPED | OUTPATIENT
Start: 2021-10-06 | End: 2021-11-04

## 2021-10-06 NOTE — TELEPHONE ENCOUNTER
Last refilled 9/14/21 for #30 with 0 RF  LOV with [address] 7/24/21  No future appt  Last TSH 8/12/21    Thyroid Supplements Protocol Passed 10/06/2021 09:22 AM   Protocol Details  TSH test in past 12 months    TSH value between 0.350 and 5.500 IU/ml    Appointme

## 2021-10-12 ENCOUNTER — LAB ENCOUNTER (OUTPATIENT)
Dept: LAB | Age: 61
End: 2021-10-12
Attending: Other
Payer: COMMERCIAL

## 2021-10-12 DIAGNOSIS — Z11.59 SCREENING FOR VIRAL DISEASE: ICD-10-CM

## 2021-10-12 DIAGNOSIS — Z01.818 PREOP EXAMINATION: ICD-10-CM

## 2021-10-13 ENCOUNTER — TELEPHONE (OUTPATIENT)
Dept: FAMILY MEDICINE CLINIC | Facility: CLINIC | Age: 61
End: 2021-10-13

## 2021-10-13 ENCOUNTER — MOBILE ENCOUNTER (OUTPATIENT)
Dept: FAMILY MEDICINE CLINIC | Facility: CLINIC | Age: 61
End: 2021-10-13

## 2021-10-13 ENCOUNTER — NURSE ONLY (OUTPATIENT)
Dept: FAMILY MEDICINE CLINIC | Facility: CLINIC | Age: 61
End: 2021-10-13
Payer: COMMERCIAL

## 2021-10-13 DIAGNOSIS — N39.0 URINARY TRACT INFECTION WITHOUT HEMATURIA, SITE UNSPECIFIED: Primary | ICD-10-CM

## 2021-10-13 DIAGNOSIS — N39.0 URINARY TRACT INFECTION WITHOUT HEMATURIA, SITE UNSPECIFIED: ICD-10-CM

## 2021-10-13 PROCEDURE — 87088 URINE BACTERIA CULTURE: CPT | Performed by: FAMILY MEDICINE

## 2021-10-13 PROCEDURE — 81003 URINALYSIS AUTO W/O SCOPE: CPT | Performed by: FAMILY MEDICINE

## 2021-10-13 PROCEDURE — 87186 SC STD MICRODIL/AGAR DIL: CPT | Performed by: FAMILY MEDICINE

## 2021-10-13 PROCEDURE — 87086 URINE CULTURE/COLONY COUNT: CPT | Performed by: FAMILY MEDICINE

## 2021-10-13 RX ORDER — NITROFURANTOIN 25; 75 MG/1; MG/1
100 CAPSULE ORAL 2 TIMES DAILY
Qty: 10 CAPSULE | Refills: 0 | Status: SHIPPED | OUTPATIENT
Start: 2021-10-13 | End: 2021-10-18

## 2021-10-13 NOTE — TELEPHONE ENCOUNTER
PT BELIEVES SHE HAS AN UTI/ COULD ORDER BE PLACED FOR LAB? OR DOES SHE NEED TO BE SEEN?     PLEASE ADVISE-THANK YOU

## 2021-10-13 NOTE — TELEPHONE ENCOUNTER
Pt reports she is pretty sure she has UTI   Felt coming on few days ago    Has taken OTC \"urinary clean relief\" - last night and this morning -  No relief at this time    Was at cardiac rehab - was not comfortable due to pressure    Does not feel best  S

## 2021-10-13 NOTE — PROGRESS NOTES
Pt presents to the clinic to give a urine sample  C/o UTI symptoms    Urine is orange as the pt has taken OTC urinary relief and has pyridium in it. There are some leuk visible in the urine dip    Per MM send urine for culture and she is sending abx.

## 2021-10-15 ENCOUNTER — OFFICE VISIT (OUTPATIENT)
Dept: SLEEP CENTER | Age: 61
End: 2021-10-15
Attending: Other
Payer: COMMERCIAL

## 2021-10-15 DIAGNOSIS — G47.33 OSA (OBSTRUCTIVE SLEEP APNEA): ICD-10-CM

## 2021-10-15 PROCEDURE — 95811 POLYSOM 6/>YRS CPAP 4/> PARM: CPT

## 2021-10-19 ENCOUNTER — LAB ENCOUNTER (OUTPATIENT)
Dept: LAB | Age: 61
End: 2021-10-19
Attending: FAMILY MEDICINE
Payer: COMMERCIAL

## 2021-10-19 DIAGNOSIS — I42.9 CARDIOMYOPATHY, UNSPECIFIED TYPE (HCC): ICD-10-CM

## 2021-10-19 DIAGNOSIS — R06.09 OTHER FORMS OF DYSPNEA: ICD-10-CM

## 2021-10-19 DIAGNOSIS — I25.10 CORONARY ARTERY DISEASE: Primary | ICD-10-CM

## 2021-10-19 DIAGNOSIS — E78.5 HYPERLIPIDEMIA, UNSPECIFIED HYPERLIPIDEMIA TYPE: ICD-10-CM

## 2021-10-19 PROCEDURE — 80061 LIPID PANEL: CPT

## 2021-10-19 PROCEDURE — 80048 BASIC METABOLIC PNL TOTAL CA: CPT

## 2021-10-19 PROCEDURE — 36415 COLL VENOUS BLD VENIPUNCTURE: CPT

## 2021-10-21 RX ORDER — CLONAZEPAM 0.5 MG/1
TABLET ORAL 2 TIMES DAILY PRN
Qty: 20 TABLET | Refills: 0 | Status: SHIPPED | OUTPATIENT
Start: 2021-10-21 | End: 2021-11-20

## 2021-10-25 RX ORDER — METOPROLOL SUCCINATE 50 MG/1
50 TABLET, EXTENDED RELEASE ORAL DAILY
Qty: 30 TABLET | Refills: 0 | Status: SHIPPED | OUTPATIENT
Start: 2021-10-25 | End: 2021-11-20

## 2021-10-25 NOTE — TELEPHONE ENCOUNTER
LRF  6/19/21 from the Cards APN    LOV  7/24/21  BP Readings from Last 3 Encounters:  07/24/21 : 100/60  06/22/21 : 110/60  06/19/21 : 112/56    No future appointments.     Last labs done 10/19/21 with cards      She is out of medication and has not been ab

## 2021-10-25 NOTE — TELEPHONE ENCOUNTER
Hypertension Medications Protocol Passed 10/25/2021 04:50 PM   Protocol Details  CMP or BMP in past 12 months    Last serum creatinine< 2.0    Appointment in past 6 or next 3 months         Order per protocol

## 2021-10-25 NOTE — TELEPHONE ENCOUNTER
Metoprolol Succinate ER 50 MG Oral Tablet 24 Hr      PT IS CURRENTLY OUT OF MEDICATION. SHE TRIED TO CALL HER CARDIOLOGIST BUT LAST WEEK BUT HAS NOT HEARD BACK FROM THEM. SHE THINKS DR. SHABAZZ FILLED MEDICATION FOR HER BEFORE.     PT WOULD LIKE REFILL SENT T

## 2021-10-29 ENCOUNTER — OFFICE VISIT (OUTPATIENT)
Dept: FAMILY MEDICINE CLINIC | Facility: CLINIC | Age: 61
End: 2021-10-29
Payer: COMMERCIAL

## 2021-10-29 VITALS
TEMPERATURE: 97 F | DIASTOLIC BLOOD PRESSURE: 72 MMHG | BODY MASS INDEX: 25 KG/M2 | WEIGHT: 137.25 LBS | OXYGEN SATURATION: 98 % | RESPIRATION RATE: 16 BRPM | HEART RATE: 72 BPM | SYSTOLIC BLOOD PRESSURE: 124 MMHG

## 2021-10-29 DIAGNOSIS — Z12.11 ENCOUNTER FOR SCREENING COLONOSCOPY: Primary | ICD-10-CM

## 2021-10-29 DIAGNOSIS — R14.0 BLOATING: ICD-10-CM

## 2021-10-29 DIAGNOSIS — R10.13 DYSPEPSIA: ICD-10-CM

## 2021-10-29 PROCEDURE — 3078F DIAST BP <80 MM HG: CPT | Performed by: FAMILY MEDICINE

## 2021-10-29 PROCEDURE — 99213 OFFICE O/P EST LOW 20 MIN: CPT | Performed by: FAMILY MEDICINE

## 2021-10-29 PROCEDURE — 3074F SYST BP LT 130 MM HG: CPT | Performed by: FAMILY MEDICINE

## 2021-10-29 RX ORDER — DAPAGLIFLOZIN 10 MG/1
10 TABLET, FILM COATED ORAL DAILY
COMMUNITY
Start: 2021-09-21

## 2021-10-29 NOTE — PROGRESS NOTES
[facility] Office Note  Chief Complaint:   Patient presents with:  Establish Care: Would like to discuss gut balance. HPI:   This is a 56year old female coming in for follow up and to establish care with provider.  Would als • Sacubitril-Valsartan (ENTRESTO) 49-51 MG Oral Tab Take 1 tablet by mouth 2 (two) times daily. • Esomeprazole Magnesium 20 MG Oral Tab EC      • sennosides-docusate 8.6-50 MG Oral Tab Take 2 tablets by mouth as needed.        • aspirin 81 MG Oral Ta any questions, the patient knows how to contact us at any time. This billing was spent on reviewing prior hospital / clinic noted, labs, medications, other tests and medical decision making.   Appropriate medical decision-making and tests are ordered as

## 2021-11-04 RX ORDER — LEVOTHYROXINE SODIUM 0.05 MG/1
TABLET ORAL
Qty: 30 TABLET | Refills: 0 | Status: SHIPPED | OUTPATIENT
Start: 2021-11-04 | End: 2021-12-08

## 2021-11-04 NOTE — TELEPHONE ENCOUNTER
Thyroid Supplements Protocol Passed 11/04/2021 11:31 AM   Protocol Details  TSH test in past 12 months    TSH value between 0.350 and 5.500 IU/ml    Appointment in past 12 or next 3 months       Order per protocol

## 2021-11-20 RX ORDER — METOPROLOL SUCCINATE 50 MG/1
TABLET, EXTENDED RELEASE ORAL
Qty: 30 TABLET | Refills: 0 | Status: SHIPPED | OUTPATIENT
Start: 2021-11-20 | End: 2021-12-26

## 2021-11-20 RX ORDER — CLONAZEPAM 0.5 MG/1
TABLET ORAL
Qty: 20 TABLET | Refills: 0 | Status: SHIPPED | OUTPATIENT
Start: 2021-11-20 | End: 2021-12-27

## 2021-11-20 NOTE — TELEPHONE ENCOUNTER
Metoprolol last refilled 10/25/21 for #30  LOV with MM 10/29/21  Last BMP 10/19/21     Hypertension Medications Protocol Passed 11/20/2021 10:48 AM   Protocol Details  CMP or BMP in past 12 months    Last serum creatinine< 2.0    Appointment in past 6 or n

## 2021-12-08 RX ORDER — LEVOTHYROXINE SODIUM 0.05 MG/1
TABLET ORAL
Qty: 30 TABLET | Refills: 0 | Status: SHIPPED | OUTPATIENT
Start: 2021-12-08 | End: 2022-01-07

## 2021-12-08 NOTE — TELEPHONE ENCOUNTER
Last refilled 11/4/21 for #30 with 0 RF  LOV with MM 10/29/21  No future appt with PCP  Last TSH 8/12/21     Thyroid Supplements Protocol Passed 12/08/2021 11:31 AM   Protocol Details  TSH test in past 12 months    TSH value between 0.350 and 5.500 IU/ml

## 2021-12-26 RX ORDER — METOPROLOL SUCCINATE 50 MG/1
TABLET, EXTENDED RELEASE ORAL
Qty: 30 TABLET | Refills: 0 | Status: SHIPPED | OUTPATIENT
Start: 2021-12-26 | End: 2022-01-24

## 2021-12-27 RX ORDER — CLONAZEPAM 0.5 MG/1
TABLET ORAL
Qty: 20 TABLET | Refills: 0 | Status: SHIPPED | OUTPATIENT
Start: 2021-12-27

## 2021-12-27 NOTE — TELEPHONE ENCOUNTER
No refill protocol for this medication. Clonazepam:  Last refill: 11- #20 with 0 refills  Last Visit: 10-  Next Visit:   No Future Appointments         Forward to Dr. Pinon Fearing, covering provider, please advise on refills. Thanks.

## 2022-01-07 RX ORDER — LEVOTHYROXINE SODIUM 0.05 MG/1
TABLET ORAL
Qty: 30 TABLET | Refills: 0 | Status: SHIPPED | OUTPATIENT
Start: 2022-01-07

## 2022-01-07 NOTE — TELEPHONE ENCOUNTER
Last refilled 12/8/21 for #30 with 0 RF  LOV with MM 10/29/21  No future appt  Thyroid Supplements Protocol Passed 01/07/2022 08:25 AM   Protocol Details  TSH test in past 12 months    TSH value between 0.350 and 5.500 IU/ml    Appointment in past 12 or ne

## 2022-01-24 RX ORDER — METOPROLOL SUCCINATE 50 MG/1
TABLET, EXTENDED RELEASE ORAL
Qty: 30 TABLET | Refills: 2 | Status: SHIPPED | OUTPATIENT
Start: 2022-01-24

## 2022-01-24 NOTE — TELEPHONE ENCOUNTER
Hypertension Medications Protocol Passed 01/23/2022 12:31 PM   Protocol Details  CMP or BMP in past 12 months    Last serum creatinine< 2.0    Appointment in past 6 or next 3 months     Last OV 10/29/21  Last labs 10/19/21  Last refilled 12/26/21     Refil Pt arrives ambulatory to [facility] accompanied by spouse.   She is here for CADD pump/5FU hook up.    Premed given as ordered slow IVP.  CADD pump connected to run at 46 hour rate.    Pt returns Thursday for de-access.  Pt will be on vacation in two weeks, so will return 3/7/18.  Pt dc'd to care of spouse in no apparent distress.

## 2022-02-14 RX ORDER — CLONAZEPAM 0.5 MG/1
TABLET ORAL
Qty: 20 TABLET | Refills: 0 | Status: SHIPPED | OUTPATIENT
Start: 2022-02-14

## 2022-02-14 RX ORDER — LEVOTHYROXINE SODIUM 0.05 MG/1
TABLET ORAL
Qty: 30 TABLET | Refills: 0 | OUTPATIENT
Start: 2022-02-14

## 2022-02-14 RX ORDER — LEVOTHYROXINE SODIUM 0.05 MG/1
50 TABLET ORAL
Qty: 30 TABLET | Refills: 0 | Status: SHIPPED | OUTPATIENT
Start: 2022-02-14 | End: 2022-03-11

## 2022-02-14 RX ORDER — CLONAZEPAM 0.5 MG/1
TABLET ORAL 2 TIMES DAILY PRN
Qty: 20 TABLET | Refills: 0 | Status: CANCELLED | OUTPATIENT
Start: 2022-02-14

## 2022-02-14 NOTE — TELEPHONE ENCOUNTER
Routing to provider per protocol. Last refilled on 12/27/21 for # 20 with 0 rf. Last seen on 10/29/21. Future Appointments   Date Time Provider Naima Rodas   2/15/2022 11:20 AM Cash Cristobal, DO [facility]        Thank you.

## 2022-02-14 NOTE — TELEPHONE ENCOUNTER
LOV: 10/29/21   Last Refill: 1/7/22 #30 0 RF    Future Appointments   Date Time Provider Naima Rodas   2/15/2022 11:20 AM Cash Cristobal, DO [facility]       Lab Results   Component Value Date    T4F 0.7 (L) 06/16/2021    TSH 1.150 08/12/2021    TSHT4 2.31 05/26/2015

## 2022-03-11 RX ORDER — LEVOTHYROXINE SODIUM 0.05 MG/1
TABLET ORAL
Qty: 30 TABLET | Refills: 0 | Status: SHIPPED | OUTPATIENT
Start: 2022-03-11

## 2022-03-11 NOTE — TELEPHONE ENCOUNTER
Last refilled 2/14/22 for #30 with 0 RF  LOV with MM 10/29/21  No future appt with pcp  Last TSH 8/12/21    Thyroid Supplements Protocol Passed 03/11/2022 09:53 AM   Protocol Details  TSH test in past 12 months    TSH value between 0.350 and 5.500 IU/ml    Appointment in past 12 or next 3 months

## 2022-03-29 ENCOUNTER — TELEPHONE (OUTPATIENT)
Dept: FAMILY MEDICINE CLINIC | Facility: CLINIC | Age: 62
End: 2022-03-29

## 2022-04-06 ENCOUNTER — ANESTHESIA EVENT (OUTPATIENT)
Dept: ENDOSCOPY | Facility: HOSPITAL | Age: 62
End: 2022-04-06
Payer: COMMERCIAL

## 2022-04-08 ENCOUNTER — HOSPITAL ENCOUNTER (OUTPATIENT)
Facility: HOSPITAL | Age: 62
Setting detail: HOSPITAL OUTPATIENT SURGERY
Discharge: HOME | End: 2022-04-08
Attending: INTERNAL MEDICINE | Admitting: INTERNAL MEDICINE
Payer: COMMERCIAL

## 2022-04-08 ENCOUNTER — ANESTHESIA (OUTPATIENT)
Dept: ENDOSCOPY | Facility: HOSPITAL | Age: 62
End: 2022-04-08
Payer: COMMERCIAL

## 2022-04-08 VITALS
TEMPERATURE: 98 F | WEIGHT: 135 LBS | BODY MASS INDEX: 24.84 KG/M2 | OXYGEN SATURATION: 96 % | SYSTOLIC BLOOD PRESSURE: 130 MMHG | HEART RATE: 70 BPM | HEIGHT: 62 IN | RESPIRATION RATE: 18 BRPM | DIASTOLIC BLOOD PRESSURE: 73 MMHG

## 2022-04-08 DIAGNOSIS — R10.31 RIGHT LOWER QUADRANT PAIN: ICD-10-CM

## 2022-04-08 PROCEDURE — 0DJD8ZZ INSPECTION OF LOWER INTESTINAL TRACT, VIA NATURAL OR ARTIFICIAL OPENING ENDOSCOPIC: ICD-10-PCS | Performed by: INTERNAL MEDICINE

## 2022-04-08 RX ORDER — SODIUM CHLORIDE, SODIUM LACTATE, POTASSIUM CHLORIDE, CALCIUM CHLORIDE 600; 310; 30; 20 MG/100ML; MG/100ML; MG/100ML; MG/100ML
INJECTION, SOLUTION INTRAVENOUS CONTINUOUS
Status: DISCONTINUED | OUTPATIENT
Start: 2022-04-08 | End: 2022-04-08

## 2022-04-08 RX ORDER — NICOTINE POLACRILEX 4 MG
15 LOZENGE BUCCAL
Status: DISCONTINUED | OUTPATIENT
Start: 2022-04-08 | End: 2022-04-08

## 2022-04-08 RX ORDER — ONDANSETRON 2 MG/ML
4 INJECTION INTRAMUSCULAR; INTRAVENOUS AS NEEDED
Status: DISCONTINUED | OUTPATIENT
Start: 2022-04-08 | End: 2022-04-08

## 2022-04-08 RX ORDER — DEXTROSE MONOHYDRATE 25 G/50ML
50 INJECTION, SOLUTION INTRAVENOUS
Status: DISCONTINUED | OUTPATIENT
Start: 2022-04-08 | End: 2022-04-08

## 2022-04-08 RX ORDER — NICOTINE POLACRILEX 4 MG
30 LOZENGE BUCCAL
Status: DISCONTINUED | OUTPATIENT
Start: 2022-04-08 | End: 2022-04-08

## 2022-04-08 RX ORDER — NALOXONE HYDROCHLORIDE 0.4 MG/ML
80 INJECTION, SOLUTION INTRAMUSCULAR; INTRAVENOUS; SUBCUTANEOUS AS NEEDED
Status: DISCONTINUED | OUTPATIENT
Start: 2022-04-08 | End: 2022-04-08

## 2022-04-08 RX ADMIN — SODIUM CHLORIDE, SODIUM LACTATE, POTASSIUM CHLORIDE, CALCIUM CHLORIDE: 600; 310; 30; 20 INJECTION, SOLUTION INTRAVENOUS at 07:56:00

## 2022-04-08 NOTE — ANESTHESIA POSTPROCEDURE EVALUATION
[facility] Patient Status:  Hospital Outpatient Surgery   Age/Gender 59year old female MRN [id number]   Location [address] [facility] 28 Attending Des Troncoso, [facility] Day # 0 PCP Mydhili Claudetta Newton, MD       Anesthesia Post-op Note    COLONOSCOPY    Procedure Summary     Date: 04/08/22 Room / Location: 140[address] [facility] ENDOSCOPY 02 / 140[address] [facility] ENDOSCOPY    Anesthesia Start: 0216 Anesthesia Stop: 3161    Procedure: COLONOSCOPY (N/A ) Diagnosis:       Right lower quadrant pain      (diverticulosis, hemorrhoids)    Surgeons: Des Troncoso DO Anesthesiologist: See Hernandez MD    Anesthesia Type: MAC ASA Status: 3          Anesthesia Type: No value filed. Vitals Value Taken Time   /57 04/08/22 0756   Temp n 04/08/22 0756   Pulse 74 04/08/22 0756   Resp 14 04/08/22 0756   SpO2 95 % 04/08/22 0756   Vitals shown include unvalidated device data. Patient Location: Endoscopy    Anesthesia Type: MAC    Postop Pain Control: adequate    Mental Status: mildly sedated but able to meaningfully participate in the post-anesthesia evaluation    Nausea/Vomiting: none    Cardiopulmonary/Hydration status: stable euvolemic    Complications: no apparent anesthesia related complications    Postop vital signs: stable    Dental Exam: Unchanged from Preop    Patient to be discharged home when criteria met.

## 2022-04-09 RX ORDER — CLONAZEPAM 0.5 MG/1
TABLET ORAL
Qty: 20 TABLET | Refills: 0 | Status: SHIPPED | OUTPATIENT
Start: 2022-04-09

## 2022-04-09 RX ORDER — LEVOTHYROXINE SODIUM 0.05 MG/1
TABLET ORAL
Qty: 30 TABLET | Refills: 2 | Status: SHIPPED | OUTPATIENT
Start: 2022-04-09

## 2022-04-09 NOTE — TELEPHONE ENCOUNTER
No refill protocol for this medication. Last refill: 2/14/2022 #20 with 0 refills  Last Visit: 10/29/2021  Next Visit:   Future Appointments   Date Time Provider Naima Rodas   5/6/2022  2:40 PM Abhi Vega MD [facility] KENNY Tadeo         Forward to Dr. María Elena Ordaz please advise on refills. Thanks.

## 2022-04-26 RX ORDER — METOPROLOL SUCCINATE 50 MG/1
TABLET, EXTENDED RELEASE ORAL
Qty: 30 TABLET | Refills: 2 | Status: SHIPPED | OUTPATIENT
Start: 2022-04-26

## 2022-04-26 NOTE — TELEPHONE ENCOUNTER
Last refilled 1/24/22 for #30 with 2 RF  LOV with MM 10/29/21  Future appt with MM 5/6/22  Protocol: none

## 2022-05-06 ENCOUNTER — OFFICE VISIT (OUTPATIENT)
Dept: FAMILY MEDICINE CLINIC | Facility: CLINIC | Age: 62
End: 2022-05-06
Payer: COMMERCIAL

## 2022-05-06 VITALS
BODY MASS INDEX: 25.3 KG/M2 | TEMPERATURE: 97 F | WEIGHT: 142.81 LBS | DIASTOLIC BLOOD PRESSURE: 84 MMHG | OXYGEN SATURATION: 98 % | RESPIRATION RATE: 20 BRPM | HEIGHT: 63 IN | SYSTOLIC BLOOD PRESSURE: 132 MMHG | HEART RATE: 77 BPM

## 2022-05-06 DIAGNOSIS — I42.9 CARDIOMYOPATHY, UNSPECIFIED TYPE (HCC): ICD-10-CM

## 2022-05-06 DIAGNOSIS — Z12.31 SCREENING MAMMOGRAM FOR BREAST CANCER: Primary | ICD-10-CM

## 2022-05-06 DIAGNOSIS — Z00.00 WELLNESS EXAMINATION: ICD-10-CM

## 2022-05-06 DIAGNOSIS — Z12.4 PAP SMEAR FOR CERVICAL CANCER SCREENING: ICD-10-CM

## 2022-05-06 DIAGNOSIS — N81.10 ACQUIRED FEMALE BLADDER PROLAPSE: ICD-10-CM

## 2022-05-06 DIAGNOSIS — E78.00 PURE HYPERCHOLESTEROLEMIA: ICD-10-CM

## 2022-05-06 DIAGNOSIS — I50.9 CHRONIC CONGESTIVE HEART FAILURE, UNSPECIFIED HEART FAILURE TYPE (HCC): ICD-10-CM

## 2022-05-06 DIAGNOSIS — E78.1 HYPERTRIGLYCERIDEMIA: ICD-10-CM

## 2022-05-06 DIAGNOSIS — Z95.810 S/P ICD (INTERNAL CARDIAC DEFIBRILLATOR) PROCEDURE: ICD-10-CM

## 2022-05-06 DIAGNOSIS — R73.09 ELEVATED HEMOGLOBIN A1C: ICD-10-CM

## 2022-05-06 DIAGNOSIS — E03.9 ACQUIRED HYPOTHYROIDISM: ICD-10-CM

## 2022-05-06 PROCEDURE — 99396 PREV VISIT EST AGE 40-64: CPT | Performed by: FAMILY MEDICINE

## 2022-05-06 PROCEDURE — 3079F DIAST BP 80-89 MM HG: CPT | Performed by: FAMILY MEDICINE

## 2022-05-06 PROCEDURE — 87624 HPV HI-RISK TYP POOLED RSLT: CPT | Performed by: FAMILY MEDICINE

## 2022-05-06 PROCEDURE — 3075F SYST BP GE 130 - 139MM HG: CPT | Performed by: FAMILY MEDICINE

## 2022-05-06 PROCEDURE — 3008F BODY MASS INDEX DOCD: CPT | Performed by: FAMILY MEDICINE

## 2022-05-09 LAB — HPV I/H RISK 1 DNA SPEC QL NAA+PROBE: NEGATIVE

## 2022-05-11 ENCOUNTER — TELEPHONE (OUTPATIENT)
Dept: FAMILY MEDICINE CLINIC | Facility: CLINIC | Age: 62
End: 2022-05-11

## 2022-05-11 NOTE — TELEPHONE ENCOUNTER
----- Message from Abhi Whipple MD sent at 5/10/2022  8:05 PM CDT -----  PAP sample satisfactory. Normal.   We can start spacing it out every 3-5 years unless anything changes in sexual health / history.

## 2022-05-31 RX ORDER — CLONAZEPAM 0.5 MG/1
TABLET ORAL
Qty: 20 TABLET | Refills: 0 | Status: SHIPPED | OUTPATIENT
Start: 2022-05-31

## 2022-06-07 RX ORDER — LEVOTHYROXINE SODIUM 0.05 MG/1
TABLET ORAL
Qty: 90 TABLET | Refills: 0 | Status: SHIPPED | OUTPATIENT
Start: 2022-06-07

## 2022-07-08 RX ORDER — CLONAZEPAM 0.5 MG/1
TABLET ORAL
Qty: 20 TABLET | Refills: 0 | Status: SHIPPED | OUTPATIENT
Start: 2022-07-08

## 2022-07-08 NOTE — TELEPHONE ENCOUNTER
Last refilled 5/31/22 for #20 with 0 RF  LOV with MM 5/6/22  No future appt with pcp  Protocol: none

## 2022-07-26 RX ORDER — METOPROLOL SUCCINATE 50 MG/1
TABLET, EXTENDED RELEASE ORAL
Qty: 30 TABLET | Refills: 5 | Status: SHIPPED | OUTPATIENT
Start: 2022-07-26

## 2022-07-26 RX ORDER — CLONAZEPAM 0.5 MG/1
TABLET ORAL
Qty: 20 TABLET | Refills: 0 | Status: SHIPPED | OUTPATIENT
Start: 2022-07-26

## 2022-07-26 NOTE — TELEPHONE ENCOUNTER
No refill protocol for this medication. Last refill: 4/26/2022 #30 with 2 refills  Last Visit: 5/06/2022  Next Visit:   No Future Appointments         Forward to Dr. Johnson Re please advise on refills. Thanks.

## 2022-08-22 RX ORDER — METOPROLOL SUCCINATE 50 MG/1
50 TABLET, EXTENDED RELEASE ORAL DAILY
Qty: 30 TABLET | Refills: 5 | OUTPATIENT
Start: 2022-08-22

## 2022-08-22 NOTE — TELEPHONE ENCOUNTER
Hypertension Medications Protocol Passed 08/22/2022 03:34 PM   Protocol Details  CMP or BMP in past 12 months    Last serum creatinine< 2.0    Appointment in past 6 or next 3 months     Called pharmacy, pt has 5 refills left on Metoprolol. Notified pt of this and pharm is getting refill ready for her. Pt understands.

## 2022-09-06 RX ORDER — LEVOTHYROXINE SODIUM 0.05 MG/1
TABLET ORAL
Qty: 90 TABLET | Refills: 0 | Status: SHIPPED | OUTPATIENT
Start: 2022-09-06

## 2022-09-06 RX ORDER — CLONAZEPAM 0.5 MG/1
TABLET ORAL
Qty: 20 TABLET | Refills: 0 | Status: SHIPPED | OUTPATIENT
Start: 2022-09-06

## 2022-09-06 NOTE — TELEPHONE ENCOUNTER
Thyroid Supplements Protocol Failed 09/04/2022 04:31 PM   Protocol Details  TSH test in past 12 months    TSH value between 0.350 and 5.500 IU/ml    Appointment in past 12 or next 3 months        Routing to provider per protocol. LEVOTHYROXINE 50 MCG Oral Tab  Last refilled on 6/7/22 for #90  with 0 rf. CLONAZEPAM 0.5 MG Oral Tab  Last refilled on 7/26/22 for #20  with 0 rf. Last labs 8/12/21. Last TSH- 1.150  Last seen on 5/6/22. Future Appointments   Date Time Provider Naima Rodas   9/16/2022 10:00 AM Severo Lights, DO [facility] OF THE [facility]          Thank you.

## 2022-09-16 ENCOUNTER — OFFICE VISIT (OUTPATIENT)
Dept: UROLOGY | Facility: HOSPITAL | Age: 62
End: 2022-09-16
Attending: OBSTETRICS & GYNECOLOGY
Payer: COMMERCIAL

## 2022-09-16 VITALS
SYSTOLIC BLOOD PRESSURE: 128 MMHG | DIASTOLIC BLOOD PRESSURE: 62 MMHG | WEIGHT: 142 LBS | HEIGHT: 63 IN | RESPIRATION RATE: 18 BRPM | BODY MASS INDEX: 25.16 KG/M2

## 2022-09-16 DIAGNOSIS — N81.6 RECTOCELE: Primary | ICD-10-CM

## 2022-09-16 DIAGNOSIS — N81.84 PELVIC MUSCLE WASTING: ICD-10-CM

## 2022-09-16 DIAGNOSIS — N81.2 UTEROVAGINAL PROLAPSE, INCOMPLETE: ICD-10-CM

## 2022-09-16 DIAGNOSIS — R35.1 NOCTURIA: ICD-10-CM

## 2022-09-16 DIAGNOSIS — N95.2 POSTMENOPAUSAL ATROPHIC VAGINITIS: ICD-10-CM

## 2022-09-16 LAB
BLOOD URINE: NEGATIVE
CONTROL RUN WITHIN 24 HOURS?: YES
LEUKOCYTE ESTERASE URINE: NEGATIVE
NITRITE URINE: NEGATIVE

## 2022-09-16 PROCEDURE — 87086 URINE CULTURE/COLONY COUNT: CPT | Performed by: OBSTETRICS & GYNECOLOGY

## 2022-09-16 PROCEDURE — 99212 OFFICE O/P EST SF 10 MIN: CPT

## 2022-09-16 PROCEDURE — 81002 URINALYSIS NONAUTO W/O SCOPE: CPT | Performed by: OBSTETRICS & GYNECOLOGY

## 2022-09-16 PROCEDURE — 51701 INSERT BLADDER CATHETER: CPT | Performed by: OBSTETRICS & GYNECOLOGY

## 2022-09-16 RX ORDER — ESTRADIOL 0.1 MG/G
CREAM VAGINAL
Qty: 42 G | Refills: 3 | Status: SHIPPED | OUTPATIENT
Start: 2022-09-16

## 2022-09-22 ENCOUNTER — OFFICE VISIT (OUTPATIENT)
Dept: UROLOGY | Facility: CLINIC | Age: 62
End: 2022-09-22
Attending: STUDENT IN AN ORGANIZED HEALTH CARE EDUCATION/TRAINING PROGRAM

## 2022-09-22 VITALS — WEIGHT: 142 LBS | HEIGHT: 63 IN | BODY MASS INDEX: 25.16 KG/M2 | TEMPERATURE: 98 F

## 2022-09-22 DIAGNOSIS — N95.2 POSTMENOPAUSAL ATROPHIC VAGINITIS: ICD-10-CM

## 2022-09-22 DIAGNOSIS — N81.2 UTEROVAGINAL PROLAPSE, INCOMPLETE: Primary | ICD-10-CM

## 2022-09-22 DIAGNOSIS — N81.84 PELVIC MUSCLE WASTING: ICD-10-CM

## 2022-09-22 DIAGNOSIS — R35.1 NOCTURIA: ICD-10-CM

## 2022-10-28 ENCOUNTER — NURSE ONLY (OUTPATIENT)
Dept: FAMILY MEDICINE CLINIC | Facility: CLINIC | Age: 62
End: 2022-10-28
Payer: COMMERCIAL

## 2022-10-28 ENCOUNTER — TELEPHONE (OUTPATIENT)
Dept: FAMILY MEDICINE CLINIC | Facility: CLINIC | Age: 62
End: 2022-10-28

## 2022-10-28 DIAGNOSIS — R30.0 DYSURIA: ICD-10-CM

## 2022-10-28 DIAGNOSIS — R30.0 DYSURIA: Primary | ICD-10-CM

## 2022-10-28 LAB
BILIRUBIN: NEGATIVE
GLUCOSE (URINE DIPSTICK): >=1000 MG/DL
KETONES (URINE DIPSTICK): NEGATIVE MG/DL
LEUKOCYTES: NEGATIVE
MULTISTIX LOT#: ABNORMAL NUMERIC
NITRITE, URINE: NEGATIVE
OCCULT BLOOD: NEGATIVE
PH, URINE: 6 (ref 4.5–8)
PROTEIN (URINE DIPSTICK): NEGATIVE MG/DL
SPECIFIC GRAVITY: 1.01 (ref 1–1.03)
URINE-COLOR: YELLOW
UROBILINOGEN,SEMI-QN: 0.2 MG/DL (ref 0–1.9)

## 2022-10-28 PROCEDURE — 87086 URINE CULTURE/COLONY COUNT: CPT | Performed by: FAMILY MEDICINE

## 2022-10-28 PROCEDURE — 81003 URINALYSIS AUTO W/O SCOPE: CPT | Performed by: FAMILY MEDICINE

## 2022-10-28 NOTE — TELEPHONE ENCOUNTER
Have her do in office urinalysis and urine for cx. If ua shows Leuks or nitrites then rx Macrobid 100 mg, 1 tab bid x 7 days. If ua negative then no rx and await the urine cx.    Thanks so much

## 2022-10-28 NOTE — TELEPHONE ENCOUNTER
Patient states symptoms started 2 days ago. Burning with urination. Also having pain in right side which is typical of her uti symptoms    States she also had episode of vomiting but thinks that was related to what she ate. States she took azo and is not having burning today. Has increased frequency  States she feels like she has side pain in her kidneys.   Took tylenol today     Would like to have urine tested    Routed to [facility] as covering provider to advise

## 2022-10-28 NOTE — TELEPHONE ENCOUNTER
Urine dip performed, no nitrates or leukocytes  No antibiotic to be sent. Greater than 1000 glucose, on farxiga  Urine culture sent      Patient notified and verbalized understanding. Advised if any worsening symptoms go to [facility] over the weekend.   Office will call when culture result back

## 2022-10-28 NOTE — PROGRESS NOTES
Patient to clinic for urine dip and culture    Dip performed, result to TJ  Urine culture sent in grey top tube

## 2022-10-31 ENCOUNTER — TELEPHONE (OUTPATIENT)
Dept: FAMILY MEDICINE CLINIC | Facility: CLINIC | Age: 62
End: 2022-10-31

## 2022-10-31 RX ORDER — CEPHALEXIN 500 MG/1
500 TABLET ORAL 3 TIMES DAILY
Qty: 21 TABLET | Refills: 0 | Status: SHIPPED | OUTPATIENT
Start: 2022-10-31 | End: 2022-11-07

## 2022-10-31 NOTE — TELEPHONE ENCOUNTER
Considering contaminant likely this is not a true UTI, however let us give Rx Cephalexin 500 mg three times per day X 7 days a try. If symptoms do not resolve please schedule office visit. If any worsening go to the ER.

## 2022-10-31 NOTE — TELEPHONE ENCOUNTER
TE sick call - 10/28/22 - pt c/o UTI symptoms  NV for urine dip and urine culture    Urine Cx shows contamination - but no Rx sent    Pt c/o flank and low back pain - pt requesting abx    Please advise, thank you

## 2022-10-31 NOTE — TELEPHONE ENCOUNTER
[facility] DR[facility]ORE [address], [facility] [place] [facility],  AT [facility] OF [facility] [id number], [phone], [facility] [address]   Phone: [phone] Fax: [phone]     PATIENT SAYS SHE BELIEVES SHE DOES NEED SOME ANTIBIOTICS. PATIENT HAS A UTI AND FEELING PAIN IN KIDNEY AND LOWER BACK AREA. PATIENT IS CONCERNED BECAUSE SHE DOESN'T WANT THE INFECTION SPREADING TO HER KIDNEYS.

## 2022-12-07 DIAGNOSIS — Z51.81 ENCOUNTER FOR MEDICATION MONITORING: ICD-10-CM

## 2022-12-07 DIAGNOSIS — E03.9 ACQUIRED HYPOTHYROIDISM: Primary | ICD-10-CM

## 2022-12-07 NOTE — TELEPHONE ENCOUNTER
LOV 05/06/22  Last labs 08/21/21  Last refill on 09/06/22, for #90 tabs, with 0 refills  LEVOTHYROXINE 50 MCG Oral Tab    Thyroid Supplements Protocol Failed 12/07/2022 04:51 PM   Protocol Details  TSH test in past 12 months    TSH value between 0.350 and 5.500 IU/ml    Appointment in past 12 or next 3 months         No future appointments. Order(s) pending, please review. Thank you.

## 2022-12-08 RX ORDER — LEVOTHYROXINE SODIUM 0.05 MG/1
TABLET ORAL
Qty: 30 TABLET | Refills: 0 | Status: SHIPPED | OUTPATIENT
Start: 2022-12-08 | End: 2023-01-07

## 2022-12-08 RX ORDER — LEVOTHYROXINE SODIUM 0.05 MG/1
50 TABLET ORAL DAILY
Qty: 90 TABLET | Refills: 0 | Status: CANCELLED | OUTPATIENT
Start: 2022-12-08

## 2022-12-08 RX ORDER — CLONAZEPAM 0.5 MG/1
TABLET ORAL 2 TIMES DAILY PRN
Qty: 20 TABLET | Refills: 0 | Status: SHIPPED | OUTPATIENT
Start: 2022-12-08

## 2022-12-08 NOTE — TELEPHONE ENCOUNTER
Please see refill encounter 12/07/2022 for Levothyroxine      Last refill on 09/06/2022, for #20 tabs, with 0 refills  CLONAZEPAM 0.5 MG Oral Tab    Order(s) pending, please review. Thank you.

## 2022-12-08 NOTE — TELEPHONE ENCOUNTER
Please let patient know that she is due for labs. Overall her thyroid function has remained stable on the current dose, and this is reassuring, but still once yearly check is still indicated. Refill provided but will need labs drawn in the next 2-3 months for continuing refills.

## 2022-12-08 NOTE — TELEPHONE ENCOUNTER
Last OV 5/6/22  Last 8/12/2021   TSH 1.150  Last refilled 9/6/22:  Clonazepam  #20  0 refill   Levothyroxine  #90  0 refill

## 2022-12-08 NOTE — TELEPHONE ENCOUNTER
Advised patient of Doctor's note below. Patient verbalized understanding. Agreeable to schedule NV for lab draw    Future Appointments   Date Time Provider Naima Rodas   12/14/2022  8:45 AM [facility] NURSE JUANCARLOS Long       Order(s) pending, please review. Thank you.   Levothyroxine - for #30 tabs

## 2022-12-09 RX ORDER — LEVOTHYROXINE SODIUM 0.05 MG/1
TABLET ORAL
Qty: 90 TABLET | Refills: 0 | OUTPATIENT
Start: 2022-12-09 | End: 2023-01-08

## 2022-12-09 NOTE — TELEPHONE ENCOUNTER
Refill for 90 day denied. Pt is due for labs. Pt is aware. Please see refill request from yesterday.

## 2023-01-10 LAB — AMB EXT TSH: 2.08 MIU/ML

## 2023-01-11 ENCOUNTER — TELEPHONE (OUTPATIENT)
Dept: FAMILY MEDICINE CLINIC | Facility: CLINIC | Age: 63
End: 2023-01-11

## 2023-01-11 LAB — TSH: 2.08 UIU/ML

## 2023-01-11 RX ORDER — LEVOTHYROXINE SODIUM 0.05 MG/1
50 TABLET ORAL DAILY
Qty: 90 TABLET | Refills: 1 | Status: SHIPPED | OUTPATIENT
Start: 2023-01-11 | End: 2023-04-11

## 2023-01-11 NOTE — TELEPHONE ENCOUNTER
[facility] [address] [facility], [facility] [place] [facility],  AT [facility] OF [facility] [address], [phone] [place] [address]   Phone: [phone] Fax: [phone]   Hours: Not open 24 hours         PATIENT CALLING THIS MORNING. SHE SAYS SHE WILL BE NEEDING A REFILL ON HER LEVOTHYROXINE. SHE BELIEVES WE ARE WAITING ON LAB WORK RESULTS SHE HAD DONE. PATIENT SAYS SHE TOOK HER LAST ONE TODAY.

## 2023-01-11 NOTE — TELEPHONE ENCOUNTER
Received fax from [facility] regarding pt's lab results  Collected 01/10/2023  TSH = 2.080      LOV 05/06/2022  Last refill on 12/08/2022, for #30 tabs, with 0 refills  LEVOTHYROXINE 50 MCG Oral Tab  Thyroid Supplements Protocol Passed 01/11/2023 09:03 AM   Protocol Details  TSH test in past 12 months    TSH value between 0.350 and 5.500 IU/ml    Appointment in past 12 or next 3 months       No future appointments.     Order per protocol

## 2023-01-12 ENCOUNTER — MED REC SCAN ONLY (OUTPATIENT)
Dept: FAMILY MEDICINE CLINIC | Facility: CLINIC | Age: 63
End: 2023-01-12

## 2023-01-23 NOTE — TELEPHONE ENCOUNTER
LOV 05/06/2022  BP Readings from Last 3 Encounters:  09/16/22 : 128/62  05/06/22 : 132/84  04/08/22 : 130/73      Last refill on 07/26/22, for #30 tabs, with 5 refills  METOPROLOL SUCCINATE ER 50 MG Oral Tablet 24 Hr  Hypertension Medications Protocol Failed 01/22/2023 05:15 AM   Protocol Details  CMP or BMP in past 12 months    Appointment in past 6 or next 3 months    Last serum creatinine< 2.0       No future appointments. Order(s) pending, please review. Thank you.

## 2023-01-25 ENCOUNTER — TELEPHONE (OUTPATIENT)
Dept: FAMILY MEDICINE CLINIC | Facility: CLINIC | Age: 63
End: 2023-01-25

## 2023-01-25 RX ORDER — CLONAZEPAM 0.5 MG/1
TABLET ORAL 2 TIMES DAILY PRN
Qty: 20 TABLET | Refills: 0 | Status: SHIPPED | OUTPATIENT
Start: 2023-01-25

## 2023-01-25 RX ORDER — METOPROLOL SUCCINATE 50 MG/1
TABLET, EXTENDED RELEASE ORAL
Qty: 30 TABLET | Refills: 5 | Status: SHIPPED | OUTPATIENT
Start: 2023-01-25

## 2023-01-25 RX ORDER — METOPROLOL SUCCINATE 50 MG/1
50 TABLET, EXTENDED RELEASE ORAL DAILY
Qty: 90 TABLET | Refills: 0 | Status: SHIPPED | OUTPATIENT
Start: 2023-01-25

## 2023-01-25 NOTE — TELEPHONE ENCOUNTER
Patient called to report her son had a friend pass away and she is trying to make arrangements to get to [place]    She requests rx today or tomorrow if possible    Please adv  Thank you

## 2023-01-25 NOTE — TELEPHONE ENCOUNTER
LRF of the Clonazepam 12/8/22 #20  Metoprolol 7/26/22 #30 with 5    BP Readings from Last 3 Encounters:  09/16/22 : 128/62  05/06/22 : 132/84  04/08/22 : 130/73    LOV 5/6/22  No future appointments.        Hypertension Medications Protocol Failed 01/25/2023 11:01 AM   Protocol Details  CMP or BMP in past 12 months    Appointment in past 6 or next 3 months    Last serum creatinine< 2.0        Called the pt and scheduled a follow up  Future Appointments   Date Time Provider Naima Rodas   3/2/2023  8:00 AM GIBSON Lira [facility] [place]

## 2023-02-09 RX ORDER — LEVOTHYROXINE SODIUM 0.05 MG/1
50 TABLET ORAL DAILY
Qty: 90 TABLET | Refills: 1 | Status: SHIPPED | OUTPATIENT
Start: 2023-02-09 | End: 2023-05-10

## 2023-02-09 RX ORDER — METOPROLOL SUCCINATE 50 MG/1
50 TABLET, EXTENDED RELEASE ORAL DAILY
Qty: 30 TABLET | Refills: 5 | Status: CANCELLED | OUTPATIENT
Start: 2023-02-09

## 2023-02-09 NOTE — TELEPHONE ENCOUNTER
LOV: 5/6/22   Last Refill: 01/11/23 90 1 RF    Future Appointments   Date Time Provider Naima Rodas   3/2/2023  8:00 AM GIBSON Martínez [facility] [place]

## 2023-02-10 NOTE — TELEPHONE ENCOUNTER
Rx was sent on 1/23/2023 for #30 days with 5 refills. [facility] sent to patient letting her know she can request refill at pharmacy.

## 2023-02-23 ENCOUNTER — OFFICE VISIT (OUTPATIENT)
Dept: FAMILY MEDICINE CLINIC | Facility: CLINIC | Age: 63
End: 2023-02-23
Payer: COMMERCIAL

## 2023-02-23 VITALS
SYSTOLIC BLOOD PRESSURE: 112 MMHG | DIASTOLIC BLOOD PRESSURE: 60 MMHG | HEART RATE: 60 BPM | WEIGHT: 143 LBS | TEMPERATURE: 99 F | OXYGEN SATURATION: 95 % | BODY MASS INDEX: 25 KG/M2

## 2023-02-23 DIAGNOSIS — G47.33 OSA (OBSTRUCTIVE SLEEP APNEA): ICD-10-CM

## 2023-02-23 DIAGNOSIS — Z95.810 ICD (IMPLANTABLE CARDIOVERTER-DEFIBRILLATOR) IN PLACE: ICD-10-CM

## 2023-02-23 DIAGNOSIS — I42.0 DILATED CARDIOMYOPATHY (HCC): Primary | ICD-10-CM

## 2023-02-23 DIAGNOSIS — E78.00 PURE HYPERCHOLESTEROLEMIA: ICD-10-CM

## 2023-02-23 DIAGNOSIS — E03.9 ACQUIRED HYPOTHYROIDISM: ICD-10-CM

## 2023-02-23 PROBLEM — F99 INSOMNIA DUE TO OTHER MENTAL DISORDER: Status: ACTIVE | Noted: 2023-02-23

## 2023-02-23 PROBLEM — F51.05 INSOMNIA DUE TO OTHER MENTAL DISORDER: Status: ACTIVE | Noted: 2023-02-23

## 2023-02-23 PROCEDURE — 3078F DIAST BP <80 MM HG: CPT | Performed by: NURSE PRACTITIONER

## 2023-02-23 PROCEDURE — 3074F SYST BP LT 130 MM HG: CPT | Performed by: NURSE PRACTITIONER

## 2023-02-23 PROCEDURE — 99214 OFFICE O/P EST MOD 30 MIN: CPT | Performed by: NURSE PRACTITIONER

## 2023-02-23 RX ORDER — ROSUVASTATIN CALCIUM 20 MG/1
20 TABLET, COATED ORAL DAILY
COMMUNITY
Start: 2023-01-24 | End: 2023-02-23

## 2023-02-23 RX ORDER — DAPAGLIFLOZIN 10 MG/1
10 TABLET, FILM COATED ORAL DAILY
Qty: 90 TABLET | Refills: 0 | Status: SHIPPED | OUTPATIENT
Start: 2023-02-23 | End: 2023-05-24

## 2023-02-23 RX ORDER — SACUBITRIL AND VALSARTAN 49; 51 MG/1; MG/1
1 TABLET, FILM COATED ORAL EVERY MORNING
Qty: 90 TABLET | Refills: 0 | COMMUNITY
Start: 2023-02-23

## 2023-02-23 RX ORDER — SACUBITRIL AND VALSARTAN 97; 103 MG/1; MG/1
1 TABLET, FILM COATED ORAL EVERY EVENING
Qty: 90 TABLET | Refills: 0 | COMMUNITY
Start: 2023-02-23

## 2023-02-23 RX ORDER — LEVOTHYROXINE SODIUM 0.05 MG/1
50 TABLET ORAL DAILY
Qty: 90 TABLET | Refills: 2 | Status: SHIPPED | OUTPATIENT
Start: 2023-02-23 | End: 2023-05-24

## 2023-02-23 RX ORDER — ROSUVASTATIN CALCIUM 20 MG/1
20 TABLET, COATED ORAL DAILY
Qty: 90 TABLET | Refills: 0 | Status: SHIPPED | OUTPATIENT
Start: 2023-02-23 | End: 2023-05-24

## 2023-02-23 RX ORDER — METOPROLOL SUCCINATE 50 MG/1
50 TABLET, EXTENDED RELEASE ORAL DAILY
Qty: 90 TABLET | Refills: 0 | Status: SHIPPED | OUTPATIENT
Start: 2023-02-23 | End: 2023-05-24

## 2023-02-23 RX ORDER — CALCIUM CARBONATE 200(500)MG
1 TABLET,CHEWABLE ORAL DAILY PRN
COMMUNITY

## 2023-03-07 ENCOUNTER — HOSPITAL ENCOUNTER (OUTPATIENT)
Dept: MAMMOGRAPHY | Age: 63
Discharge: HOME | End: 2023-03-07
Attending: FAMILY MEDICINE
Payer: COMMERCIAL

## 2023-03-07 DIAGNOSIS — Z12.31 SCREENING MAMMOGRAM FOR BREAST CANCER: ICD-10-CM

## 2023-03-07 PROCEDURE — 77067 SCR MAMMO BI INCL CAD: CPT | Performed by: FAMILY MEDICINE

## 2023-03-07 PROCEDURE — 77063 BREAST TOMOSYNTHESIS BI: CPT | Performed by: FAMILY MEDICINE

## 2023-03-08 NOTE — TELEPHONE ENCOUNTER
Last refilled 1/25/23 for #20 with 0 RF  LOV with PJ 2/23/23  No future appt with MM  Protocol: none

## 2023-03-09 RX ORDER — CLONAZEPAM 0.5 MG/1
TABLET ORAL
Qty: 20 TABLET | Refills: 0 | Status: SHIPPED | OUTPATIENT
Start: 2023-03-09

## 2023-04-12 RX ORDER — CLONAZEPAM 0.5 MG/1
TABLET ORAL 2 TIMES DAILY PRN
Qty: 20 TABLET | Refills: 0 | Status: SHIPPED | OUTPATIENT
Start: 2023-04-12

## 2023-04-28 ENCOUNTER — TELEPHONE (OUTPATIENT)
Dept: FAMILY MEDICINE CLINIC | Facility: CLINIC | Age: 63
End: 2023-04-28

## 2023-04-28 NOTE — TELEPHONE ENCOUNTER
Letter mailed to patient reminding them they have outstanding orders.   Lab Frequency Next Occurrence   TSH W REFLEX TO FREE T4 Once 54/19/1859   COMP METABOLIC PANEL (14) Once 81/54/6542   LIPID PANEL Once 02/23/2023

## 2023-05-12 ENCOUNTER — HOSPITAL ENCOUNTER (OUTPATIENT)
Dept: GENERAL RADIOLOGY | Age: 63
Discharge: HOME | End: 2023-05-12
Attending: NURSE PRACTITIONER
Payer: COMMERCIAL

## 2023-05-12 ENCOUNTER — OFFICE VISIT (OUTPATIENT)
Dept: FAMILY MEDICINE CLINIC | Facility: CLINIC | Age: 63
End: 2023-05-12
Payer: COMMERCIAL

## 2023-05-12 VITALS
SYSTOLIC BLOOD PRESSURE: 118 MMHG | DIASTOLIC BLOOD PRESSURE: 76 MMHG | HEART RATE: 88 BPM | OXYGEN SATURATION: 97 % | WEIGHT: 142 LBS | BODY MASS INDEX: 25 KG/M2 | RESPIRATION RATE: 16 BRPM | TEMPERATURE: 98 F

## 2023-05-12 DIAGNOSIS — E78.00 PURE HYPERCHOLESTEROLEMIA: ICD-10-CM

## 2023-05-12 DIAGNOSIS — I50.9 CHRONIC CONGESTIVE HEART FAILURE, UNSPECIFIED HEART FAILURE TYPE (HCC): ICD-10-CM

## 2023-05-12 DIAGNOSIS — R93.1 HIGH CORONARY ARTERY CALCIUM SCORE: ICD-10-CM

## 2023-05-12 DIAGNOSIS — E03.9 ACQUIRED HYPOTHYROIDISM: ICD-10-CM

## 2023-05-12 DIAGNOSIS — M25.551 RIGHT HIP PAIN: ICD-10-CM

## 2023-05-12 DIAGNOSIS — Z13.820 SCREENING FOR OSTEOPOROSIS: ICD-10-CM

## 2023-05-12 DIAGNOSIS — Z00.00 GENERAL MEDICAL EXAM: Primary | ICD-10-CM

## 2023-05-12 DIAGNOSIS — K59.09 CHRONIC CONSTIPATION: ICD-10-CM

## 2023-05-12 PROCEDURE — 3078F DIAST BP <80 MM HG: CPT | Performed by: NURSE PRACTITIONER

## 2023-05-12 PROCEDURE — 73502 X-RAY EXAM HIP UNI 2-3 VIEWS: CPT | Performed by: NURSE PRACTITIONER

## 2023-05-12 PROCEDURE — 3074F SYST BP LT 130 MM HG: CPT | Performed by: NURSE PRACTITIONER

## 2023-05-12 PROCEDURE — 99396 PREV VISIT EST AGE 40-64: CPT | Performed by: NURSE PRACTITIONER

## 2023-05-12 RX ORDER — CLONAZEPAM 0.5 MG/1
0.25 TABLET ORAL 2 TIMES DAILY PRN
Qty: 20 TABLET | Refills: 0 | COMMUNITY
Start: 2023-05-12

## 2023-05-12 RX ORDER — LEVOTHYROXINE SODIUM 0.05 MG/1
50 TABLET ORAL DAILY
Qty: 90 TABLET | Refills: 0 | Status: SHIPPED | OUTPATIENT
Start: 2023-05-12 | End: 2023-08-10

## 2023-05-13 ENCOUNTER — TELEPHONE (OUTPATIENT)
Dept: FAMILY MEDICINE CLINIC | Facility: CLINIC | Age: 63
End: 2023-05-13

## 2023-05-13 NOTE — TELEPHONE ENCOUNTER
----- Message from GIBSON Hensley sent at 5/13/2023  6:50 AM CDT -----  Results reviewed.  No bony abnormalities noted on hip xray  Start PT and schedule with ortho as discussed

## 2023-05-18 ENCOUNTER — OFFICE VISIT (OUTPATIENT)
Dept: FAMILY MEDICINE CLINIC | Facility: CLINIC | Age: 63
End: 2023-05-18
Payer: COMMERCIAL

## 2023-05-18 VITALS
HEART RATE: 66 BPM | TEMPERATURE: 98 F | SYSTOLIC BLOOD PRESSURE: 122 MMHG | DIASTOLIC BLOOD PRESSURE: 72 MMHG | OXYGEN SATURATION: 99 %

## 2023-05-18 DIAGNOSIS — J06.9 VIRAL URI: Primary | ICD-10-CM

## 2023-05-18 DIAGNOSIS — H61.21 IMPACTED CERUMEN, RIGHT EAR: ICD-10-CM

## 2023-05-18 DIAGNOSIS — R05.1 ACUTE COUGH: ICD-10-CM

## 2023-05-18 PROCEDURE — 3074F SYST BP LT 130 MM HG: CPT | Performed by: NURSE PRACTITIONER

## 2023-05-18 PROCEDURE — 99213 OFFICE O/P EST LOW 20 MIN: CPT | Performed by: NURSE PRACTITIONER

## 2023-05-18 PROCEDURE — 69210 REMOVE IMPACTED EAR WAX UNI: CPT | Performed by: NURSE PRACTITIONER

## 2023-05-18 PROCEDURE — 3078F DIAST BP <80 MM HG: CPT | Performed by: NURSE PRACTITIONER

## 2023-05-18 RX ORDER — BENZONATATE 100 MG/1
100 CAPSULE ORAL 3 TIMES DAILY PRN
Qty: 20 CAPSULE | Refills: 0 | Status: SHIPPED | OUTPATIENT
Start: 2023-05-18

## 2023-05-24 ENCOUNTER — TELEPHONE (OUTPATIENT)
Dept: PHYSICAL THERAPY | Facility: HOSPITAL | Age: 63
End: 2023-05-24

## 2023-05-31 RX ORDER — CLONAZEPAM 0.5 MG/1
0.25 TABLET ORAL 2 TIMES DAILY PRN
Qty: 20 TABLET | Refills: 0 | Status: SHIPPED | OUTPATIENT
Start: 2023-05-31

## 2023-05-31 NOTE — TELEPHONE ENCOUNTER
Last OV:05/18/2023 sick, 05/12/2023 physical   Last refill:05/12/2023, 20 tabs, 0 refill     Medication pended, please sign if appropriate

## 2023-06-01 ENCOUNTER — TELEPHONE (OUTPATIENT)
Dept: PHYSICAL THERAPY | Facility: HOSPITAL | Age: 63
End: 2023-06-01

## 2023-06-05 ENCOUNTER — TELEPHONE (OUTPATIENT)
Dept: PHYSICAL THERAPY | Facility: HOSPITAL | Age: 63
End: 2023-06-05

## 2023-06-05 ENCOUNTER — APPOINTMENT (OUTPATIENT)
Dept: PHYSICAL THERAPY | Age: 63
End: 2023-06-05
Attending: NURSE PRACTITIONER
Payer: COMMERCIAL

## 2023-06-07 ENCOUNTER — TELEPHONE (OUTPATIENT)
Dept: FAMILY MEDICINE CLINIC | Facility: CLINIC | Age: 63
End: 2023-06-07

## 2023-06-07 ENCOUNTER — TELEPHONE (OUTPATIENT)
Dept: PHYSICAL THERAPY | Facility: HOSPITAL | Age: 63
End: 2023-06-07

## 2023-06-07 NOTE — TELEPHONE ENCOUNTER
Letter mailed to patient reminding them they have outstanding orders.   Lab Frequency Next Occurrence   TSH W REFLEX TO FREE T4 Once 12/08/2022

## 2023-06-12 ENCOUNTER — APPOINTMENT (OUTPATIENT)
Dept: PHYSICAL THERAPY | Age: 63
End: 2023-06-12
Attending: NURSE PRACTITIONER
Payer: COMMERCIAL

## 2023-06-14 ENCOUNTER — APPOINTMENT (OUTPATIENT)
Dept: PHYSICAL THERAPY | Age: 63
End: 2023-06-14
Attending: NURSE PRACTITIONER
Payer: COMMERCIAL

## 2023-06-19 ENCOUNTER — APPOINTMENT (OUTPATIENT)
Dept: PHYSICAL THERAPY | Age: 63
End: 2023-06-19
Attending: NURSE PRACTITIONER
Payer: COMMERCIAL

## 2023-06-21 ENCOUNTER — APPOINTMENT (OUTPATIENT)
Dept: PHYSICAL THERAPY | Age: 63
End: 2023-06-21
Attending: NURSE PRACTITIONER
Payer: COMMERCIAL

## 2023-06-28 ENCOUNTER — APPOINTMENT (OUTPATIENT)
Dept: PHYSICAL THERAPY | Age: 63
End: 2023-06-28
Attending: NURSE PRACTITIONER
Payer: COMMERCIAL

## 2023-07-25 NOTE — TELEPHONE ENCOUNTER
Last OV:05/18/2023  Last refill: 05/31/2023, 20 tabs, 0 refill     Medication pended, please sign if appropriate

## 2023-07-26 RX ORDER — CLONAZEPAM 0.5 MG/1
0.25 TABLET ORAL 2 TIMES DAILY PRN
Qty: 20 TABLET | Refills: 0 | Status: SHIPPED | OUTPATIENT
Start: 2023-07-26

## 2023-08-16 DIAGNOSIS — E03.9 ACQUIRED HYPOTHYROIDISM: ICD-10-CM

## 2023-08-16 RX ORDER — LEVOTHYROXINE SODIUM 0.05 MG/1
50 TABLET ORAL DAILY
Qty: 90 TABLET | Refills: 0 | Status: SHIPPED | OUTPATIENT
Start: 2023-08-16 | End: 2023-11-14

## 2023-08-16 NOTE — TELEPHONE ENCOUNTER
Pt has been seeing Bernie  Levothyroxine, 3 month supply [facility] on [address]   her my chart is not correct please call pt.

## 2023-08-16 NOTE — TELEPHONE ENCOUNTER
Left message to voicemail (per verbal release form consent, confirmed with identifying message.)  Advised patient to call office back [phone] - need to confirm \"mychart is not correct\" ? ? LOV 05/18/23 ayleen/ Alexandr Said APRN  Last labs 01/10/23  Last refill on 05/12/23, for #90 tabs, with 0 refills  levothyroxine 50 MCG Oral Tab     Thyroid Supplements Protocol Xvyctx2508/16/2023 01:53 PM   Protocol Details Appointment in past 12 or next 3 months    TSH test in past 12 months    TSH value between 0.350 and 5.500 IU/ml        No future appointments. Order(s) pending, please review. Thank you.   Soy 06/44

## 2023-08-30 DIAGNOSIS — I42.0 DILATED CARDIOMYOPATHY (HCC): ICD-10-CM

## 2023-08-30 DIAGNOSIS — E78.00 PURE HYPERCHOLESTEROLEMIA: ICD-10-CM

## 2023-08-30 RX ORDER — ROSUVASTATIN CALCIUM 20 MG/1
20 TABLET, COATED ORAL DAILY
Qty: 90 TABLET | Refills: 1 | Status: SHIPPED | OUTPATIENT
Start: 2023-08-30 | End: 2023-11-28

## 2023-08-30 RX ORDER — DAPAGLIFLOZIN 10 MG/1
10 TABLET, FILM COATED ORAL DAILY
Qty: 90 TABLET | Refills: 1 | Status: SHIPPED | OUTPATIENT
Start: 2023-08-30 | End: 2023-11-28

## 2023-09-14 DIAGNOSIS — E03.9 ACQUIRED HYPOTHYROIDISM: ICD-10-CM

## 2023-09-14 RX ORDER — LEVOTHYROXINE SODIUM 0.05 MG/1
50 TABLET ORAL DAILY
Qty: 90 TABLET | Refills: 0 | Status: SHIPPED | OUTPATIENT
Start: 2023-09-14 | End: 2023-12-13

## 2023-09-14 RX ORDER — CLONAZEPAM 0.5 MG/1
0.25 TABLET ORAL 2 TIMES DAILY PRN
Qty: 20 TABLET | Refills: 0 | Status: SHIPPED | OUTPATIENT
Start: 2023-09-14

## 2023-10-18 ENCOUNTER — OFFICE VISIT (OUTPATIENT)
Dept: FAMILY MEDICINE CLINIC | Facility: CLINIC | Age: 63
End: 2023-10-18
Payer: COMMERCIAL

## 2023-10-18 VITALS
TEMPERATURE: 97 F | WEIGHT: 145 LBS | HEIGHT: 63 IN | BODY MASS INDEX: 25.69 KG/M2 | HEART RATE: 71 BPM | OXYGEN SATURATION: 99 % | DIASTOLIC BLOOD PRESSURE: 76 MMHG | SYSTOLIC BLOOD PRESSURE: 110 MMHG

## 2023-10-18 DIAGNOSIS — E03.9 ACQUIRED HYPOTHYROIDISM: ICD-10-CM

## 2023-10-18 DIAGNOSIS — B37.31 YEAST INFECTION OF THE VAGINA: Primary | ICD-10-CM

## 2023-10-18 DIAGNOSIS — N81.4 UTERINE PROLAPSE: ICD-10-CM

## 2023-10-18 PROCEDURE — 99213 OFFICE O/P EST LOW 20 MIN: CPT | Performed by: NURSE PRACTITIONER

## 2023-10-18 PROCEDURE — 3078F DIAST BP <80 MM HG: CPT | Performed by: NURSE PRACTITIONER

## 2023-10-18 PROCEDURE — 3074F SYST BP LT 130 MM HG: CPT | Performed by: NURSE PRACTITIONER

## 2023-10-18 PROCEDURE — 3008F BODY MASS INDEX DOCD: CPT | Performed by: NURSE PRACTITIONER

## 2023-10-18 RX ORDER — LEVOTHYROXINE SODIUM 0.05 MG/1
50 TABLET ORAL DAILY
Qty: 90 TABLET | Refills: 0 | Status: SHIPPED | OUTPATIENT
Start: 2023-10-18 | End: 2024-01-16

## 2023-10-18 RX ORDER — METOPROLOL SUCCINATE 50 MG/1
50 TABLET, EXTENDED RELEASE ORAL DAILY
Qty: 90 TABLET | Refills: 1 | Status: SHIPPED | OUTPATIENT
Start: 2023-10-18 | End: 2024-01-16

## 2023-10-18 RX ORDER — METOPROLOL SUCCINATE 50 MG/1
50 TABLET, EXTENDED RELEASE ORAL DAILY
COMMUNITY
Start: 2021-06-18 | End: 2023-10-18

## 2023-10-24 DIAGNOSIS — R05.1 ACUTE COUGH: ICD-10-CM

## 2023-10-24 RX ORDER — BENZONATATE 100 MG/1
100 CAPSULE ORAL 3 TIMES DAILY PRN
Qty: 20 CAPSULE | Refills: 0 | Status: SHIPPED | OUTPATIENT
Start: 2023-10-24

## 2023-10-24 NOTE — TELEPHONE ENCOUNTER
Spoke with patient who states she has a cold and hacking cough. Having a hard time sleeping due to cough. States last time she had something like this before Ehingen sent in benzonatate. States no SOB or wheezing. States she just caught a cold.     Routed to EBONY to advise    Last OV 10/18/23  Last refilled 5/18/23  #20  0 refill

## 2023-10-30 ENCOUNTER — OFFICE VISIT (OUTPATIENT)
Dept: FAMILY MEDICINE CLINIC | Facility: CLINIC | Age: 63
End: 2023-10-30

## 2023-10-30 ENCOUNTER — HOSPITAL ENCOUNTER (OUTPATIENT)
Dept: GENERAL RADIOLOGY | Age: 63
Discharge: HOME | End: 2023-10-30
Attending: NURSE PRACTITIONER
Payer: COMMERCIAL

## 2023-10-30 ENCOUNTER — TELEPHONE (OUTPATIENT)
Dept: FAMILY MEDICINE CLINIC | Facility: CLINIC | Age: 63
End: 2023-10-30

## 2023-10-30 VITALS
SYSTOLIC BLOOD PRESSURE: 104 MMHG | RESPIRATION RATE: 12 BRPM | OXYGEN SATURATION: 97 % | BODY MASS INDEX: 26 KG/M2 | DIASTOLIC BLOOD PRESSURE: 60 MMHG | TEMPERATURE: 97 F | WEIGHT: 146 LBS | HEART RATE: 81 BPM

## 2023-10-30 DIAGNOSIS — J20.9 ACUTE BRONCHITIS, UNSPECIFIED ORGANISM: ICD-10-CM

## 2023-10-30 DIAGNOSIS — R05.1 ACUTE COUGH: ICD-10-CM

## 2023-10-30 DIAGNOSIS — R05.1 ACUTE COUGH: Primary | ICD-10-CM

## 2023-10-30 PROCEDURE — 3078F DIAST BP <80 MM HG: CPT | Performed by: NURSE PRACTITIONER

## 2023-10-30 PROCEDURE — 3074F SYST BP LT 130 MM HG: CPT | Performed by: NURSE PRACTITIONER

## 2023-10-30 PROCEDURE — 99213 OFFICE O/P EST LOW 20 MIN: CPT | Performed by: NURSE PRACTITIONER

## 2023-10-30 PROCEDURE — 71046 X-RAY EXAM CHEST 2 VIEWS: CPT | Performed by: NURSE PRACTITIONER

## 2023-10-30 RX ORDER — ALBUTEROL SULFATE 90 UG/1
1-2 AEROSOL, METERED RESPIRATORY (INHALATION) EVERY 4 HOURS PRN
Qty: 1 EACH | Refills: 0 | Status: SHIPPED | OUTPATIENT
Start: 2023-10-30

## 2023-10-30 RX ORDER — PREDNISONE 20 MG/1
20 TABLET ORAL DAILY
Qty: 5 TABLET | Refills: 0 | Status: SHIPPED | OUTPATIENT
Start: 2023-10-30 | End: 2023-11-04

## 2023-10-30 NOTE — TELEPHONE ENCOUNTER
----- Message from Jj Sidhu sent at 10/30/2023 12:45 PM CDT -----  Regarding: pt call back  Pt called back ph. [phone]

## 2023-10-30 NOTE — TELEPHONE ENCOUNTER
----- Message from GIBSON Marr sent at 10/30/2023 11:32 AM CDT -----  Results reviewed. No pneumonia noted on chest xray.  Will send steroid & PRN inhaler but no abx needed

## 2023-12-08 RX ORDER — CLONAZEPAM 0.5 MG/1
0.25 TABLET ORAL 2 TIMES DAILY PRN
Qty: 20 TABLET | Refills: 0 | Status: SHIPPED | OUTPATIENT
Start: 2023-12-08

## 2023-12-08 NOTE — TELEPHONE ENCOUNTER
LOV: 10/30/23 for cough    clonazePAM 0.5 MG Oral Tab  Take 0.5 tablets (0.25 mg total) by mouth 2 (two) times daily as needed for Anxiety. Dispense: 20 tablet, Refills: 0 ordered       09/14/2023      No future appointments.   Please advise

## 2024-01-23 DIAGNOSIS — I42.0 DILATED CARDIOMYOPATHY (HCC): ICD-10-CM

## 2024-01-23 NOTE — TELEPHONE ENCOUNTER
levothyroxine 50 MCG Oral Tab     FARXIGA 10 MG Oral Tab     [facility]

## 2024-01-23 NOTE — TELEPHONE ENCOUNTER
Last OV 10/30/23  Levothyroxine last refilled on 10/18/23 for # 90 with 0 refills  Farxiga 8/30/23 #90 1 refill  No future appointments.     Due for TSH and other fasting labs, does she need an OV as well?  Last A1c was in 2021

## 2024-01-24 RX ORDER — DAPAGLIFLOZIN 10 MG/1
10 TABLET, FILM COATED ORAL DAILY
Qty: 90 TABLET | Refills: 3 | Status: SHIPPED | OUTPATIENT
Start: 2024-01-24 | End: 2024-04-23

## 2024-01-24 RX ORDER — LEVOTHYROXINE SODIUM 0.05 MG/1
50 TABLET ORAL DAILY
Qty: 90 TABLET | Refills: 0 | Status: SHIPPED | OUTPATIENT
Start: 2024-01-24 | End: 2024-04-23

## 2024-02-16 DIAGNOSIS — E78.00 PURE HYPERCHOLESTEROLEMIA: ICD-10-CM

## 2024-02-17 NOTE — TELEPHONE ENCOUNTER
Last refill-rosuvastatin-2/23/23-(discontinued taking 8/23)  Last refill metoprolol-10/18/23 #90+1  Last office visit-10/30/23-cough, 10/18/23-yeast infection, 5/12/23 Annual physical  No future appointments.   Last Lipid,CMP 3/14/23  Refill pended for approval

## 2024-02-19 RX ORDER — ROSUVASTATIN CALCIUM 20 MG/1
20 TABLET, COATED ORAL DAILY
Qty: 90 TABLET | Refills: 1 | Status: SHIPPED | OUTPATIENT
Start: 2024-02-19

## 2024-02-19 RX ORDER — METOPROLOL SUCCINATE 50 MG/1
50 TABLET, EXTENDED RELEASE ORAL DAILY
Qty: 90 TABLET | Refills: 1 | Status: SHIPPED | OUTPATIENT
Start: 2024-02-19

## 2024-03-29 DIAGNOSIS — Z12.31 BREAST CANCER SCREENING BY MAMMOGRAM: Primary | ICD-10-CM

## 2024-03-29 NOTE — TELEPHONE ENCOUNTER
Thyroid Medication Protocol Failed03/29/2024 09:51 AM   Protocol Details TSH in past 12 months    Last TSH value is normal    In person appointment or virtual visit in the past 12 mos or max        TCB

## 2024-03-29 NOTE — TELEPHONE ENCOUNTER
Patient requests refill    levothyroxine 50 MCG Oral Tab     [facility]     Patient is also requesting order for screening mammogram    Please adv  Thank you

## 2024-03-29 NOTE — TELEPHONE ENCOUNTER
Last OV 10/30/23  Last refilled on 1/24/24 for # 90 with 0 refills  Future Appointments   Date Time Provider Department Center   6/6/2024  9:00 AM Loulou Santana MD [facility] [place]        Last leatha done 3/7/23    CONCLUSION:  No mammographic evidence of malignancy.     BI-RADS CATEGORY:    DIAGNOSTIC CATEGORY 1--NEGATIVE ASSESSMENT.       RECOMMENDATIONS:    ROUTINE MAMMOGRAM AND CLINICAL EVALUATION IN 12 MONTHS.      Order placed

## 2024-04-04 ENCOUNTER — HOSPITAL ENCOUNTER (OUTPATIENT)
Dept: MAMMOGRAPHY | Age: 64
Discharge: HOME | End: 2024-04-04
Attending: NURSE PRACTITIONER
Payer: COMMERCIAL

## 2024-04-04 DIAGNOSIS — Z12.31 BREAST CANCER SCREENING BY MAMMOGRAM: ICD-10-CM

## 2024-04-04 PROCEDURE — 77063 BREAST TOMOSYNTHESIS BI: CPT | Performed by: NURSE PRACTITIONER

## 2024-04-04 PROCEDURE — 77067 SCR MAMMO BI INCL CAD: CPT | Performed by: NURSE PRACTITIONER

## 2024-04-05 ENCOUNTER — TELEPHONE (OUTPATIENT)
Dept: FAMILY MEDICINE CLINIC | Facility: CLINIC | Age: 64
End: 2024-04-05

## 2024-04-05 RX ORDER — LEVOTHYROXINE SODIUM 0.05 MG/1
50 TABLET ORAL DAILY
Qty: 30 TABLET | Refills: 0 | Status: SHIPPED | OUTPATIENT
Start: 2024-04-05 | End: 2024-07-04

## 2024-04-05 RX ORDER — LEVOTHYROXINE SODIUM 0.05 MG/1
50 TABLET ORAL DAILY
Qty: 90 TABLET | Refills: 0 | OUTPATIENT
Start: 2024-04-05

## 2024-04-05 NOTE — TELEPHONE ENCOUNTER
Patient aware she is due for labs  Patient states she is out of her thyroid medication  30 day bridging sent  Patient is going to call back with name of lab she would like orders faxed to.

## 2024-04-05 NOTE — TELEPHONE ENCOUNTER
PT CALLED AND ADV WOULD LIKE LAB ORDERS PRINTED AND FAXED TO [facility].    PRINTED AND FAXED

## 2024-04-08 LAB
AMB EXT CHOLESTEROL, TOTAL: 156 MG/DL
AMB EXT CMP ALT: 15 U/L
AMB EXT HDL CHOLESTEROL: 53 MG/DL
AMB EXT LDL CHOLESTEROL, DIRECT: 75 MG/DL
AMB EXT TRIGLYCERIDES: 156 MG/DL
AMB EXT TSH: 1.97 MIU/ML

## 2024-04-25 ENCOUNTER — TELEPHONE (OUTPATIENT)
Dept: FAMILY MEDICINE CLINIC | Facility: CLINIC | Age: 64
End: 2024-04-25

## 2024-04-25 NOTE — TELEPHONE ENCOUNTER
Patient scheduled appointment on my chart for Possible hernia need referral also update on bloodwork done to go over   Ok to wait till appointment.   Future Appointments   Date Time Provider Department Center   5/2/2024  1:40 PM Rosy Haque APRN [facility] [place]   5/30/2024  2:40 PM Loulou Santana MD [facility] [place]

## 2024-04-26 NOTE — TELEPHONE ENCOUNTER
LMTCB  Per verbal dr. Esther gudino to wait until scheduled apt.  Patient to go to IC if symptoms worsen prior  Future Appointments   Date Time Provider Department Center   5/2/2024  1:40 PM Rosy Haque APRN [facility] [place]   5/30/2024  2:40 PM Loulou Santana MD [facility] [place]

## 2024-04-26 NOTE — TELEPHONE ENCOUNTER
Patient has intermittent constipation for past 2-3 years  Patient uses stool softeners  Patient has felt a lump that comes for some time  Right side in lower groin area   Currently cannot feel lump  Currently not painful  Painful off and on - described as sharp pain  Denies nausea/vomiting  Denies fever  Had small bowel movement this morning that helped with discomfort.  Patient advised to not lift anything over 10 lbs  Patient advised to go to IC if pain and lump return  Okay to wait until 5/2?

## 2024-04-29 ENCOUNTER — OFFICE VISIT (OUTPATIENT)
Dept: FAMILY MEDICINE CLINIC | Facility: CLINIC | Age: 64
End: 2024-04-29
Payer: COMMERCIAL

## 2024-04-29 VITALS
OXYGEN SATURATION: 97 % | DIASTOLIC BLOOD PRESSURE: 82 MMHG | HEIGHT: 63 IN | HEART RATE: 79 BPM | BODY MASS INDEX: 26.96 KG/M2 | RESPIRATION RATE: 18 BRPM | WEIGHT: 152.19 LBS | SYSTOLIC BLOOD PRESSURE: 122 MMHG | TEMPERATURE: 97 F

## 2024-04-29 DIAGNOSIS — F51.05 INSOMNIA DUE TO OTHER MENTAL DISORDER: ICD-10-CM

## 2024-04-29 DIAGNOSIS — K59.04 CHRONIC IDIOPATHIC CONSTIPATION: ICD-10-CM

## 2024-04-29 DIAGNOSIS — F99 INSOMNIA DUE TO OTHER MENTAL DISORDER: ICD-10-CM

## 2024-04-29 DIAGNOSIS — K40.90 NON-RECURRENT UNILATERAL INGUINAL HERNIA WITHOUT OBSTRUCTION OR GANGRENE: Primary | ICD-10-CM

## 2024-04-29 PROBLEM — R00.1 BRADYCARDIA: Status: RESOLVED | Noted: 2021-06-15 | Resolved: 2024-04-29

## 2024-04-29 PROBLEM — IMO0002 EJECTION FRACTION < 50%: Status: RESOLVED | Noted: 2021-06-07 | Resolved: 2024-04-29

## 2024-04-29 PROBLEM — R94.30 EJECTION FRACTION < 50%: Status: RESOLVED | Noted: 2021-06-07 | Resolved: 2024-04-29

## 2024-04-29 PROBLEM — Z95.0 PACEMAKER: Status: ACTIVE | Noted: 2024-04-29

## 2024-04-29 PROCEDURE — 96127 BRIEF EMOTIONAL/BEHAV ASSMT: CPT | Performed by: NURSE PRACTITIONER

## 2024-04-29 PROCEDURE — 99213 OFFICE O/P EST LOW 20 MIN: CPT | Performed by: NURSE PRACTITIONER

## 2024-04-29 RX ORDER — CLONAZEPAM 0.5 MG/1
0.25 TABLET ORAL 2 TIMES DAILY PRN
Qty: 20 TABLET | Refills: 0 | Status: SHIPPED | OUTPATIENT
Start: 2024-04-29

## 2024-04-29 RX ORDER — DAPAGLIFLOZIN 10 MG/1
TABLET, FILM COATED ORAL
COMMUNITY
Start: 2024-04-23

## 2024-04-29 NOTE — PROGRESS NOTES
HPI:     Patient is her for possible hernia. First noticed about 3 years ago. She had cardiac cath and they accessed her right femoral vein. She developed a small hematoma after the procedure. Since that time she gets occasional discomfort in the right groin. Can feel a bump once in awhile. Definitely worse when she is constipated. Struggles with chronic constipation. Last colonoscopy normal in 2022.     Current Outpatient Medications   Medication Sig Dispense Refill    FARXIGA 10 MG Oral Tab       Multiple Vitamin (MULTI-VITAMIN) Oral Tab multivitamin tablet, [RxNorm: 0]      clonazePAM 0.5 MG Oral Tab Take 0.5 tablets (0.25 mg total) by mouth 2 (two) times daily as needed for Anxiety. 20 tablet 0    levothyroxine 50 MCG Oral Tab Take 1 tablet (50 mcg total) by mouth daily. 30 tablet 0    rosuvastatin 20 MG Oral Tab Take 1 tablet (20 mg total) by mouth daily. 90 tablet 1    metoprolol succinate ER 50 MG Oral Tablet 24 Hr Take 1 tablet (50 mg total) by mouth daily. 90 tablet 1    sacubitril-valsartan (ENTRESTO) 49-51 MG Oral Tab Take 1 tablet by mouth every morning. 90 tablet 0    sacubitril-valsartan (ENTRESTO)  MG Oral Tab Take 1 tablet by mouth every evening. 90 tablet 0    calcium carbonate 500 MG Oral Chew Tab Chew 1 tablet (500 mg total) by mouth daily as needed for Reflux.      albuterol 108 (90 Base) MCG/ACT Inhalation Aero Soln Inhale 1-2 puffs into the lungs every 4 (four) hours as needed for Wheezing or Shortness of Breath. (Patient not taking: Reported on 4/29/2024) 1 each 0      Past Medical History:    Abdominal distention    Abdominal hernia    Hiatel hernia    Abdominal pain    From intermitent constipation    Anxiety    After pacemaker    Arthritis    Had MRI of spine due to pain    Back pain    Did yoga to help and improved posture    Belching    After new medicaions with pacemaker    Bloating    Occasional    Bradycardia    Cardiac defibrillator in place    Congestive heart disease (HCC)     Constipation    More frequent with new medications    Diarrhea, unspecified    On and off less frequent now    Dizziness    Med increase caused dizzyness at cardio workout    Ejection fraction < 50%    15-20% 6/2021      Esophageal reflux    Flatulence/gas pain/belching    Occational    Food intolerance    Pay attention to what I eat    Frequent urination    Frequent use of laxatives    Senna soft gels and recently metamucil    Heart palpitations    Heartburn    Use  omeprazole or tums as needed    Hemorrhoids    Occationally    High blood pressure    High cholesterol    History of 2019 novel coronavirus disease (COVID-19)    No Hospitalizations. Symptoms: cough,loss of taste and smell,fatigue headache    Hx of motion sickness    Hyperlipidemia    Indigestion    Iccationally    Irregular bowel habits    Since new medications after surgery    Pacemaker    Pain with bowel movements    Sometimes when constipated    Sleep apnea    Sleep disturbance    Diagnosed with severe sleep apnea 8/18/21    Stool incontinence    Stress     diagnosed with cancer    Thyroid disease    Visual impairment    glasses,contacts    Wears glasses    Glasses since I entered 2nd grade    Weight loss    Doing cardio rehab lost 8 pounds      Past Surgical History:   Procedure Laterality Date    Cardiac defibrillator placement      [employer]    Cardiac pacemaker placement  6/21/21    Colonoscopy N/A 4/8/2022    Procedure: COLONOSCOPY;  Surgeon: Cash Cristobal DO;  Location:  ENDOSCOPY      Family History   Problem Relation Age of Onset    Heart Disorder Father         AAA    Diabetes Mother 85        Passed away in 2019    Hypertension Mother     Other (Other) Mother         thyroid    Cancer Maternal Grandfather         throat    Other (Other) Sister         thyroid    Diabetes Brother         Got taken off medication/improved his diet      Social History     Socioeconomic History    Marital status:    Tobacco Use     Smoking status: Never    Smokeless tobacco: Never   Vaping Use    Vaping status: Never Used   Substance and Sexual Activity    Alcohol use: Yes     Alcohol/week: 4.0 standard drinks of alcohol     Types: 4 Cans of beer per week     Comment: weekly    Drug use: No     Social Determinants of Health      Received [facility], [facility]    Social Connections    Received from [facility], [facility]    Housing Stability         REVIEW OF SYSTEMS:   Review of Systems   Constitutional:  Negative for fatigue.   Gastrointestinal:  Positive for constipation. Negative for abdominal pain (not currently), blood in stool, diarrhea, nausea and vomiting.   Psychiatric/Behavioral:  Positive for sleep disturbance (occasionally).        EXAM:   /82   Pulse 79   Temp 96.9 °F (36.1 °C)   Resp 18   Ht 5' 3\" (1.6 m)   Wt 152 lb 3.2 oz (69 kg)   SpO2 97%   BMI 26.96 kg/m²   Physical Exam  Constitutional:       General: She is not in acute distress.     Appearance: Normal appearance.   Cardiovascular:      Rate and Rhythm: Normal rate and regular rhythm.      Heart sounds: Normal heart sounds.   Pulmonary:      Effort: Pulmonary effort is normal.      Breath sounds: Normal breath sounds.   Abdominal:      General: Bowel sounds are normal.      Palpations: Abdomen is soft.      Hernia: A hernia is present. Hernia is present in the right inguinal area (reducible, felt best when standing).   Neurological:      Mental Status: She is alert.       ASSESSMENT AND PLAN:   Diagnoses and all orders for this visit:    Non-recurrent unilateral inguinal hernia without obstruction or gangrene  -     Surgery Referral - In Network  -     CT ABDOMEN+PELVIS(CPT=74176); Future    Chronic idiopathic constipation  -     CT ABDOMEN+PELVIS(CPT=74176); Future    Insomnia due to other mental disorder  -     clonazePAM 0.5 MG Oral Tab; Take 0.5 tablets (0.25 mg total) by  mouth 2 (two) times daily as needed for Anxiety.    Will check imaging and see surgery  Schedule follow up with gastro specialist

## 2024-05-06 RX ORDER — LEVOTHYROXINE SODIUM 0.05 MG/1
50 TABLET ORAL DAILY
Qty: 90 TABLET | Refills: 0 | Status: SHIPPED | OUTPATIENT
Start: 2024-05-06

## 2024-05-06 NOTE — TELEPHONE ENCOUNTER
Thyroid Medication Protocol [id number]/06/2024 10:21 AM   Protocol Details TSH in past 12 months    Last TSH value is normal    In person appointment or virtual visit in the past 12 mos or appointment i        External TSH in Epic from 4/8/24  Component  Ref Range & Units 4/8/24  8:08 AM   TSH  0.450 - 4.500 uIU/mL 1.970   FREE THYROXINE  0.82 - 1.77 ng/dL 1.24       Protocol passed  Refill sent

## 2024-05-09 ENCOUNTER — TELEPHONE (OUTPATIENT)
Dept: FAMILY MEDICINE CLINIC | Facility: CLINIC | Age: 64
End: 2024-05-09

## 2024-05-09 NOTE — TELEPHONE ENCOUNTER
[employer] [facility] is requesting the CT order for this patient.  States they offer free CT scans.  Order printed and faxed to  [phone]

## 2024-05-30 ENCOUNTER — OFFICE VISIT (OUTPATIENT)
Dept: FAMILY MEDICINE CLINIC | Facility: CLINIC | Age: 64
End: 2024-05-30

## 2024-05-30 VITALS
RESPIRATION RATE: 16 BRPM | HEART RATE: 72 BPM | TEMPERATURE: 98 F | SYSTOLIC BLOOD PRESSURE: 132 MMHG | BODY MASS INDEX: 26.51 KG/M2 | DIASTOLIC BLOOD PRESSURE: 82 MMHG | WEIGHT: 149.63 LBS | HEIGHT: 63 IN | OXYGEN SATURATION: 100 %

## 2024-05-30 DIAGNOSIS — Z95.810 S/P ICD (INTERNAL CARDIAC DEFIBRILLATOR) PROCEDURE: ICD-10-CM

## 2024-05-30 DIAGNOSIS — Z78.0 MENOPAUSE: ICD-10-CM

## 2024-05-30 DIAGNOSIS — E03.9 ACQUIRED HYPOTHYROIDISM: ICD-10-CM

## 2024-05-30 DIAGNOSIS — I83.90 VARICOSE VEIN: ICD-10-CM

## 2024-05-30 DIAGNOSIS — I42.9 CARDIOMYOPATHY, UNSPECIFIED TYPE (HCC): ICD-10-CM

## 2024-05-30 DIAGNOSIS — Z95.0 S/P PLACEMENT OF CARDIAC PACEMAKER: ICD-10-CM

## 2024-05-30 DIAGNOSIS — Z00.00 ANNUAL PHYSICAL EXAM: Primary | ICD-10-CM

## 2024-05-30 DIAGNOSIS — E78.00 PURE HYPERCHOLESTEROLEMIA: ICD-10-CM

## 2024-05-30 DIAGNOSIS — G47.30 SLEEP APNEA, UNSPECIFIED TYPE: ICD-10-CM

## 2024-05-30 DIAGNOSIS — H91.90 HEARING LOSS, UNSPECIFIED HEARING LOSS TYPE, UNSPECIFIED LATERALITY: ICD-10-CM

## 2024-05-30 DIAGNOSIS — Z78.0 ASYMPTOMATIC MENOPAUSE: ICD-10-CM

## 2024-05-30 PROCEDURE — 99397 PER PM REEVAL EST PAT 65+ YR: CPT | Performed by: FAMILY MEDICINE

## 2024-05-30 NOTE — PATIENT INSTRUCTIONS
Exercise for a Healthier Heart     Exercise with a friend. When activity is fun, you're more likely to stick with it.   You may wonder how you can improve the health of your heart. If you’re thinking about exercise, you’re on the right track. You don’t need to become an athlete, but you do need a certain amount of brisk exercise to help strengthen your heart. If you have been diagnosed with a heart condition, your doctor may recommend exercise to help stabilize your condition. To help make exercise a habit, choose safe, fun activities.  Be sure to check with your healthcare provider before starting an exercise program.  Why exercise?  Exercising regularly offers many healthy rewards. It can help you do all of the following:  Improve your blood cholesterol level to help prevent further heart trouble  Lower your blood pressure to help prevent a stroke or heart attack  Control diabetes, or reduce your risk of getting this disease  Improve your heart and lung function  Reach and maintain a healthy weight  Make your muscles stronger and more limber so you can stay active  Prevent falls and fractures by slowing the loss of bone mass (osteoporosis)  Manage stress better  Reduce your blood pressure  Improve your sense of self and your body image  Exercise tips  Ease into your routine. Set small goals. Then build on them.  Exercise on most days. Aim for a total of 150 or more minutes of moderate to  vigorous intensity activity each week. Consider 40 minutes, 3 to 4 times a week. For best results, activity should last for 40 minutes on average. It is OK to work up to the 40 minute period over time. Examples of moderate-intensity activity is walking 1 mile in 15 minutes or 30 to 45 minutes of yard work.  Step up your daily activity level. Along with your exercise program, try being more active throughout the day. Walk instead of drive. Do more household tasks or yard work.  Choose one or more activities you enjoy. Walking is  one of the easiest things you can do. You can also try swimming, riding a bike, dancing, or taking an exercise class.  Stop exercising and call your doctor if you:  Have chest pain or feel dizzy or lightheaded  Feel burning, tightness, pressure, or heaviness in your chest, neck, shoulders, back, or arms  Have unusual shortness of breath  Have increased joint or muscle pain  Have palpitations or an irregular heartbeat  Date Last Reviewed: 5/1/2016  © 6065-1457 [employer]. [address] [place] [address]. All rights reserved. This information is not intended as a substitute for professional medical care. Always follow your healthcare professional's instructions.           4 Steps for Eating Healthier  Changing the way you eat can improve your health. It can lower your cholesterol and blood pressure, and help you stay at a healthy weight. Your diet doesn’t have to be bland and boring to be healthy. Just watch your calories and follow these steps:    Step 1. Eat fewer unhealthy fats  Choose more fish and lean meats instead of fatty cuts of meat.  Skip butter and lard, and use less margarine.  Pass on foods that have palm, coconut, or hydrogenated oils.  Eat fewer high-fat dairy foods like cheese, ice cream, and whole milk.  Get a heart-healthy cookbook and try some low-fat recipes.     Step 2. Go light on salt  Keep the saltshaker off the table.  Limit high-salt ingredients, such as soy sauce, bouillon, and garlic salt.  Instead of adding salt when cooking, season your food with herbs and flavorings. Try lemon, garlic, and onion, or salt-free herb seasonings.  Limit convenience foods, such as boxed or canned foods and restaurant food.  Read food labels and choose lower-sodium options.     Step 3. Limit sugar  Pause before you add sugars to pancakes, cereal, coffee, or tea. This includes white and brown table sugar, syrup, honey, and molasses. Cut your usual amount by half.  Use non-sugar  sweeteners. Stevia, aspartame, and sucralose can satisfy a sweet tooth without adding calories.  Swap out sugar-filled soda and other drinks. Buy sugar-free or low-calorie beverages. Remember water is always the best choice.  Read labels and choose foods with less added sugar. Keep in mind that dairy foods and foods with fruit will have some natural sugar.  Cut the sugar in recipes by 1/3 to 1/2. Boost the flavor with extracts like almond, vanilla, or orange. Or add spices such as cinnamon or nutmeg.     Step 4. Eat more fiber  Eat fresh fruits and vegetables every day.  Boost your diet with whole grains. Go for oats, whole-grain rice, and bran.  Add beans and lentils to your meals.  Drink more water to match your fiber increase to help prevent constipation.     Date Last Reviewed: 6/1/2017  © 9020-6658 [employer]. [address] [place] [address], [place] [address]. All rights reserved. This information is not intended as a substitute for professional medical care. Always follow your healthcare professional's instructions.

## 2024-06-13 ENCOUNTER — PATIENT MESSAGE (OUTPATIENT)
Dept: FAMILY MEDICINE CLINIC | Facility: CLINIC | Age: 64
End: 2024-06-13

## 2024-06-13 ENCOUNTER — HOSPITAL ENCOUNTER (OUTPATIENT)
Dept: BONE DENSITY | Age: 64
Discharge: HOME | End: 2024-06-13
Attending: FAMILY MEDICINE
Payer: COMMERCIAL

## 2024-06-13 DIAGNOSIS — M85.852 OSTEOPENIA OF NECK OF LEFT FEMUR: Primary | ICD-10-CM

## 2024-06-13 DIAGNOSIS — Z78.0 MENOPAUSE: ICD-10-CM

## 2024-06-13 DIAGNOSIS — Z78.0 ASYMPTOMATIC MENOPAUSE: ICD-10-CM

## 2024-06-13 PROCEDURE — 77080 DXA BONE DENSITY AXIAL: CPT | Performed by: FAMILY MEDICINE

## 2024-06-13 NOTE — TELEPHONE ENCOUNTER
Bone scan shows osteopenia femoral neck area. Recommend calcium 500mg daily and vit d 2000iu daily.  Regarding medication for osteopenia recommend to see endocrine. Referral given.

## 2024-06-13 NOTE — TELEPHONE ENCOUNTER
From: Maricruz De La Rosa  To: Loulou Santana  Sent: 6/13/2024 12:23 PM CDT  Subject: Question regarding XR DEXA BONE DENSITOMETRY (CPT=77080)    Looks like my neck is a problem area. Will I need to take any medication? Anything else to help?

## 2024-06-20 ENCOUNTER — OFFICE VISIT (OUTPATIENT)
Facility: LOCATION | Age: 64
End: 2024-06-20
Payer: COMMERCIAL

## 2024-06-20 VITALS — HEART RATE: 73 BPM | OXYGEN SATURATION: 95 % | TEMPERATURE: 97 F

## 2024-06-20 DIAGNOSIS — K43.9 SPIGELIAN HERNIA: Primary | ICD-10-CM

## 2024-07-15 ENCOUNTER — TELEPHONE (OUTPATIENT)
Facility: LOCATION | Age: 64
End: 2024-07-15

## 2024-07-15 DIAGNOSIS — K42.9 UMBILICAL HERNIA WITHOUT OBSTRUCTION AND WITHOUT GANGRENE: ICD-10-CM

## 2024-07-15 DIAGNOSIS — K40.20 BILATERAL INGUINAL HERNIA WITHOUT OBSTRUCTION OR GANGRENE, RECURRENCE NOT SPECIFIED: Primary | ICD-10-CM

## 2024-07-23 ENCOUNTER — MED REC SCAN ONLY (OUTPATIENT)
Dept: FAMILY MEDICINE CLINIC | Facility: CLINIC | Age: 64
End: 2024-07-23

## 2024-07-28 DIAGNOSIS — E78.00 PURE HYPERCHOLESTEROLEMIA: ICD-10-CM

## 2024-07-29 RX ORDER — ROSUVASTATIN CALCIUM 20 MG/1
20 TABLET, COATED ORAL DAILY
Qty: 90 TABLET | Refills: 0 | Status: SHIPPED | OUTPATIENT
Start: 2024-07-29

## 2024-07-29 NOTE — TELEPHONE ENCOUNTER
Cholesterol Medication Protocol Eixfgt2007/29/2024 08:54 AM   Protocol Details ALT < 80    ALT resulted within past year    Lipid panel within past 12 months    In person appointment or virtual visit in the       Last office visit 5/30/24  Last refilled on 2/19/24 for # 90 with 1 refills  No future appointments.   External lipid panel in Epic from 4/8/24-entered into [facility]  Protocol passed  Refill sent

## 2024-08-01 DIAGNOSIS — F99 INSOMNIA DUE TO OTHER MENTAL DISORDER: ICD-10-CM

## 2024-08-01 DIAGNOSIS — F51.05 INSOMNIA DUE TO OTHER MENTAL DISORDER: ICD-10-CM

## 2024-08-01 RX ORDER — CLONAZEPAM 0.5 MG/1
0.25 TABLET ORAL 2 TIMES DAILY PRN
Qty: 20 TABLET | Refills: 0 | Status: SHIPPED | OUTPATIENT
Start: 2024-08-01

## 2024-08-01 NOTE — TELEPHONE ENCOUNTER
Patient requesting refill for     clonazePAM 0.5 MG Oral Tab     [id number] - [place], [place] - [address] AT [facility] [address]

## 2024-08-01 NOTE — TELEPHONE ENCOUNTER
Last office visit 5/3/24  Last refilled on 4/29/24 for # 20 with 0 refills  No future appointments.     Thank you.

## 2024-08-08 RX ORDER — LEVOTHYROXINE SODIUM 0.05 MG/1
50 TABLET ORAL DAILY
Qty: 90 TABLET | Refills: 0 | Status: SHIPPED | OUTPATIENT
Start: 2024-08-08

## 2024-08-08 NOTE — TELEPHONE ENCOUNTER
LOV: 5/30/24 for physical    levothyroxine 50 MCG Oral Tab  TAKE 1 TABLET(50 MCG) BY MOUTH DAILY Dispense: 90 tablet, Refills: 0 ordered        05/06/2024       Thyroid Medication Protocol [id number]/08/2024 01:49 PM   Protocol Details TSH in past 12 months    Last TSH value is normal    In person appointment or virtual visit in the past 12 mos or appointment in next 3 mos     No future appointments.

## 2024-08-09 ENCOUNTER — TELEPHONE (OUTPATIENT)
Facility: LOCATION | Age: 64
End: 2024-08-09

## 2024-08-09 ENCOUNTER — OFFICE VISIT (OUTPATIENT)
Dept: FAMILY MEDICINE CLINIC | Facility: CLINIC | Age: 64
End: 2024-08-09
Payer: COMMERCIAL

## 2024-08-09 VITALS
OXYGEN SATURATION: 98 % | WEIGHT: 149 LBS | DIASTOLIC BLOOD PRESSURE: 70 MMHG | BODY MASS INDEX: 26 KG/M2 | RESPIRATION RATE: 18 BRPM | HEART RATE: 78 BPM | SYSTOLIC BLOOD PRESSURE: 128 MMHG | TEMPERATURE: 98 F

## 2024-08-09 DIAGNOSIS — R07.89 CHEST PRESSURE: Primary | ICD-10-CM

## 2024-08-09 PROCEDURE — 99213 OFFICE O/P EST LOW 20 MIN: CPT | Performed by: PHYSICIAN ASSISTANT
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
## 2024-08-09 NOTE — TELEPHONE ENCOUNTER
Good morning,   The patient recently called stating that she has missed placed her instruction that she received at her consultation for her upcoming surgery, she would like a call and a MyChart message of the instructions so that she can be prepared for the upcoming surgery.    Call back # [phone]

## 2024-08-16 RX ORDER — POLYETHYLENE GLYCOL 3350 17 G/17G
17 POWDER, FOR SOLUTION ORAL DAILY
COMMUNITY

## 2024-08-21 ENCOUNTER — TELEPHONE (OUTPATIENT)
Facility: LOCATION | Age: 64
End: 2024-08-21

## 2024-08-21 ENCOUNTER — TELEPHONE (OUTPATIENT)
Dept: RHEUMATOLOGY | Facility: CLINIC | Age: 64
End: 2024-08-21

## 2024-08-21 NOTE — TELEPHONE ENCOUNTER
Patient calling regarding her surgery on 8/28/24 and has some questions.    Call back number is [phone].

## 2024-08-21 NOTE — TELEPHONE ENCOUNTER
Prior authorization approved for 130[address]. Ref #: [id number].     IF DOS CHANGES, PLEASE CONTACT PTS INS [phone]

## 2024-08-21 NOTE — TELEPHONE ENCOUNTER
CARDIA CLEARANCE RECEIVED   For Bilateral Laparoscopic Bilateral Inguinal Hernia Repair with mesh 8/28/2024, Umbilical Herniorrhaphy Possible Mesh    PER:  Dr. Jacky Alcantara is at acceptable cardia risk to proceed with hernia surgery as planned    Faxed to PAT and Placed in binder

## 2024-08-21 NOTE — TELEPHONE ENCOUNTER
Patient advised to call billing regarding anesthesiologist that we contract with.  Patient advised to contact laboratory that did her labs to ask about why paper shows [facility] on it.  Patient transferred to schedulers regarding pre-authorization question.  Patient advised we will contact Dr. Rico to follow up on clearance letter.  Patient verbalized understanding.

## 2024-08-23 ENCOUNTER — ANESTHESIA EVENT (OUTPATIENT)
Dept: SURGERY | Facility: HOSPITAL | Age: 64
End: 2024-08-23
Payer: COMMERCIAL

## 2024-08-28 ENCOUNTER — ANESTHESIA (OUTPATIENT)
Dept: SURGERY | Facility: HOSPITAL | Age: 64
End: 2024-08-28
Payer: COMMERCIAL

## 2024-08-28 ENCOUNTER — HOSPITAL ENCOUNTER (OUTPATIENT)
Facility: HOSPITAL | Age: 64
Setting detail: HOSPITAL OUTPATIENT SURGERY
Discharge: HOME | End: 2024-08-28
Attending: SURGERY | Admitting: SURGERY
Payer: COMMERCIAL

## 2024-08-28 VITALS
SYSTOLIC BLOOD PRESSURE: 103 MMHG | HEIGHT: 63 IN | TEMPERATURE: 97 F | HEART RATE: 70 BPM | BODY MASS INDEX: 26.05 KG/M2 | DIASTOLIC BLOOD PRESSURE: 64 MMHG | WEIGHT: 147 LBS | RESPIRATION RATE: 14 BRPM | OXYGEN SATURATION: 92 %

## 2024-08-28 DIAGNOSIS — Z01.818 PRE-OP TESTING: Primary | ICD-10-CM

## 2024-08-28 DIAGNOSIS — G89.18 POST-OPERATIVE PAIN: ICD-10-CM

## 2024-08-28 PROCEDURE — 0YUA4JZ SUPPLEMENT BILATERAL INGUINAL REGION WITH SYNTHETIC SUBSTITUTE, PERCUTANEOUS ENDOSCOPIC APPROACH: ICD-10-PCS | Performed by: SURGERY

## 2024-08-28 DEVICE — BARD MESH
Type: IMPLANTABLE DEVICE | Site: GROIN | Status: FUNCTIONAL
Brand: BARD MESH

## 2024-08-28 RX ORDER — ACETAMINOPHEN 500 MG
1000 TABLET ORAL ONCE
Status: DISCONTINUED | OUTPATIENT
Start: 2024-08-28 | End: 2024-08-28 | Stop reason: HOSPADM

## 2024-08-28 RX ORDER — SODIUM CHLORIDE, SODIUM LACTATE, POTASSIUM CHLORIDE, CALCIUM CHLORIDE 600; 310; 30; 20 MG/100ML; MG/100ML; MG/100ML; MG/100ML
INJECTION, SOLUTION INTRAVENOUS CONTINUOUS
Status: DISCONTINUED | OUTPATIENT
Start: 2024-08-28 | End: 2024-08-28

## 2024-08-28 RX ORDER — HYDROMORPHONE HYDROCHLORIDE 1 MG/ML
INJECTION, SOLUTION INTRAMUSCULAR; INTRAVENOUS; SUBCUTANEOUS
Status: COMPLETED
Start: 2024-08-28 | End: 2024-08-28

## 2024-08-28 RX ORDER — HYDROMORPHONE HYDROCHLORIDE 1 MG/ML
0.4 INJECTION, SOLUTION INTRAMUSCULAR; INTRAVENOUS; SUBCUTANEOUS EVERY 5 MIN PRN
Status: DISCONTINUED | OUTPATIENT
Start: 2024-08-28 | End: 2024-08-28

## 2024-08-28 RX ORDER — METOPROLOL TARTRATE 1 MG/ML
INJECTION, SOLUTION INTRAVENOUS AS NEEDED
Status: DISCONTINUED | OUTPATIENT
Start: 2024-08-28 | End: 2024-08-28 | Stop reason: SURG

## 2024-08-28 RX ORDER — ONDANSETRON 2 MG/ML
INJECTION INTRAMUSCULAR; INTRAVENOUS AS NEEDED
Status: DISCONTINUED | OUTPATIENT
Start: 2024-08-28 | End: 2024-08-28 | Stop reason: SURG

## 2024-08-28 RX ORDER — HYDROMORPHONE HYDROCHLORIDE 1 MG/ML
0.2 INJECTION, SOLUTION INTRAMUSCULAR; INTRAVENOUS; SUBCUTANEOUS EVERY 5 MIN PRN
Status: DISCONTINUED | OUTPATIENT
Start: 2024-08-28 | End: 2024-08-28

## 2024-08-28 RX ORDER — ACETAMINOPHEN 500 MG
1000 TABLET ORAL ONCE AS NEEDED
Status: COMPLETED | OUTPATIENT
Start: 2024-08-28 | End: 2024-08-28

## 2024-08-28 RX ORDER — HEPARIN SODIUM 5000 [USP'U]/ML
INJECTION, SOLUTION INTRAVENOUS; SUBCUTANEOUS
Status: DISCONTINUED
Start: 2024-08-28 | End: 2024-08-28

## 2024-08-28 RX ORDER — LABETALOL HYDROCHLORIDE 5 MG/ML
5 INJECTION, SOLUTION INTRAVENOUS EVERY 5 MIN PRN
Status: DISCONTINUED | OUTPATIENT
Start: 2024-08-28 | End: 2024-08-28

## 2024-08-28 RX ORDER — BUPIVACAINE HYDROCHLORIDE AND EPINEPHRINE 5; 5 MG/ML; UG/ML
INJECTION, SOLUTION EPIDURAL; INTRACAUDAL; PERINEURAL AS NEEDED
Status: DISCONTINUED | OUTPATIENT
Start: 2024-08-28 | End: 2024-08-28 | Stop reason: HOSPADM

## 2024-08-28 RX ORDER — SCOLOPAMINE TRANSDERMAL SYSTEM 1 MG/1
PATCH, EXTENDED RELEASE TRANSDERMAL
Status: DISCONTINUED
Start: 2024-08-28 | End: 2024-08-28

## 2024-08-28 RX ORDER — HEPARIN SODIUM 5000 [USP'U]/ML
5000 INJECTION, SOLUTION INTRAVENOUS; SUBCUTANEOUS ONCE
Status: COMPLETED | OUTPATIENT
Start: 2024-08-28 | End: 2024-08-28

## 2024-08-28 RX ORDER — HYDROCODONE BITARTRATE AND ACETAMINOPHEN 5; 325 MG/1; MG/1
2 TABLET ORAL ONCE AS NEEDED
Status: COMPLETED | OUTPATIENT
Start: 2024-08-28 | End: 2024-08-28

## 2024-08-28 RX ORDER — PHENYLEPHRINE HCL 10 MG/ML
VIAL (ML) INJECTION AS NEEDED
Status: DISCONTINUED | OUTPATIENT
Start: 2024-08-28 | End: 2024-08-28 | Stop reason: SURG

## 2024-08-28 RX ORDER — SCOLOPAMINE TRANSDERMAL SYSTEM 1 MG/1
1 PATCH, EXTENDED RELEASE TRANSDERMAL ONCE
Status: DISCONTINUED | OUTPATIENT
Start: 2024-08-28 | End: 2024-08-28

## 2024-08-28 RX ORDER — MIDAZOLAM HYDROCHLORIDE 1 MG/ML
INJECTION INTRAMUSCULAR; INTRAVENOUS AS NEEDED
Status: DISCONTINUED | OUTPATIENT
Start: 2024-08-28 | End: 2024-08-28 | Stop reason: SURG

## 2024-08-28 RX ORDER — ROCURONIUM BROMIDE 10 MG/ML
INJECTION, SOLUTION INTRAVENOUS AS NEEDED
Status: DISCONTINUED | OUTPATIENT
Start: 2024-08-28 | End: 2024-08-28 | Stop reason: SURG

## 2024-08-28 RX ORDER — NALOXONE HYDROCHLORIDE 0.4 MG/ML
0.08 INJECTION, SOLUTION INTRAMUSCULAR; INTRAVENOUS; SUBCUTANEOUS AS NEEDED
Status: DISCONTINUED | OUTPATIENT
Start: 2024-08-28 | End: 2024-08-28

## 2024-08-28 RX ORDER — HYDROCODONE BITARTRATE AND ACETAMINOPHEN 5; 325 MG/1; MG/1
1 TABLET ORAL ONCE AS NEEDED
Status: COMPLETED | OUTPATIENT
Start: 2024-08-28 | End: 2024-08-28

## 2024-08-28 RX ORDER — HYDROCODONE BITARTRATE AND ACETAMINOPHEN 5; 325 MG/1; MG/1
1 TABLET ORAL EVERY 6 HOURS PRN
Qty: 15 TABLET | Refills: 0 | Status: SHIPPED | OUTPATIENT
Start: 2024-08-28

## 2024-08-28 RX ORDER — HYDROMORPHONE HYDROCHLORIDE 1 MG/ML
0.6 INJECTION, SOLUTION INTRAMUSCULAR; INTRAVENOUS; SUBCUTANEOUS EVERY 5 MIN PRN
Status: DISCONTINUED | OUTPATIENT
Start: 2024-08-28 | End: 2024-08-28

## 2024-08-28 RX ADMIN — SODIUM CHLORIDE, SODIUM LACTATE, POTASSIUM CHLORIDE, CALCIUM CHLORIDE: 600; 310; 30; 20 INJECTION, SOLUTION INTRAVENOUS at 11:07:00

## 2024-08-28 RX ADMIN — ROCURONIUM BROMIDE 50 MG: 10 INJECTION, SOLUTION INTRAVENOUS at 11:16:00

## 2024-08-28 RX ADMIN — PHENYLEPHRINE HCL 100 MCG: 10 MG/ML VIAL (ML) INJECTION at 11:31:00

## 2024-08-28 RX ADMIN — MIDAZOLAM HYDROCHLORIDE 2 MG: 1 INJECTION INTRAMUSCULAR; INTRAVENOUS at 11:07:00

## 2024-08-28 RX ADMIN — SODIUM CHLORIDE, SODIUM LACTATE, POTASSIUM CHLORIDE, CALCIUM CHLORIDE: 600; 310; 30; 20 INJECTION, SOLUTION INTRAVENOUS at 13:25:00

## 2024-08-28 RX ADMIN — METOPROLOL TARTRATE 20 MG: 1 INJECTION, SOLUTION INTRAVENOUS at 11:21:00

## 2024-08-28 RX ADMIN — ONDANSETRON 4 MG: 2 INJECTION INTRAMUSCULAR; INTRAVENOUS at 12:57:00

## 2024-08-28 RX ADMIN — ROCURONIUM BROMIDE 10 MG: 10 INJECTION, SOLUTION INTRAVENOUS at 12:24:00

## 2024-08-28 RX ADMIN — PHENYLEPHRINE HCL 100 MCG: 10 MG/ML VIAL (ML) INJECTION at 11:38:00

## 2024-08-28 NOTE — DISCHARGE INSTRUCTIONS
Home Care Instructions  Laparoscopic Inguinal Hernia  Dr Yifan Melchor    MEDICATIONS  For post-operative pain control the medications are usually Norco or Tylenol #3.  These are narcotics and are best taken by starting with one tablet and repeating every four to six hours as needed.  If the patient does not feel they need the narcotics they shouldn’t take them.  If the pain is severe the patient may take up to two pills every four hours.  If a minor supplement is necessary in addition to the narcotics do not overlap with Tylenol (any product with acetaminophen) as both Norco and Tylenol #3 contain Tylenol.  Please supplement with ibuprofen.  Please ask your surgeon before resuming blood thinners such as aspirin, Plavix or Coumadin.  All other home medications may be resumed as scheduled.    DIET  The patient may resume a general diet immediately.  This is not a good time to eat excessively. The patient should eat in moderation and stick with foods the patient feels are easy to digest.  There should be no alcohol consumption in the immediate recover time period or within six hours of taking narcotics.    WOUND CARE  The top dressing may be removed the day after surgery.  This includes the gauze, tape and band-aids if they are present.  Do not remove the steri-strips or butterfly tapes that are white and adherent to the skin.  The steri-strips will eventually peel up at the ends and at this point they may be removed.  This is usually seven to ten days after surgery.  The patient may shower the day after surgery.  There is no need to cover the incisions and all top gauze type dressing should be removed prior to showering.  Soap can get on the wounds but do not scrub over the wounds.  No hair dye or chemicals of any kind should get in the wounds.  Avoid tub baths for two weeks.  If visible sutures or staples are present they will be removed in the office by the surgeon or nurse.  Most wounds will be closed with  dissolving suture underneath the skin.  These sutures will dissolve on their own.    ACTIVITY  Every day the patient should be up, showered and dressed.  Each day the patient should be up and around the house.  The patient should not lie in bed and should not stay in pajamas.  We count on the patient being up, coughing, walking and deep breathing to avoid pneumonia and blood clots in the legs.  Once a day the patient should get out of the house and go shopping, go to the mall, the [employer] store, the movies or a restaurant.  The patient may ride in a car but should not drive the car for at least one week.  Patients should be off narcotics for at least 8 hours prior to being the .  The average time off work is 10 to 14 days; most adults will be seeing the surgeon prior to returning to work.  Patients may go up and down stairs and lift up to five pounds but no bending, pushing or pulling.  Nothing called work or exercise until the follow up visit.  No ‘stair-master’ power walking, jogging or workout until the follow up visit.  Patients should seek further activity limits at the time of their appointment.  If the patient is unable to urinate and feels a painful and full bladder call us immediately.    APPOINTMENT  Please call our office today for an appointment within five to ten days of discharge.  Any fever greater than 100.5, chills, nausea, vomiting, or severe diarrhea, please call our office.  If the wound turns red, hot, swollen, becomes increasingly painful, or drains pus call us immediately at [phone].  For life threatening emergencies call [phone].  For non-emergent care please call our office after 8:30 a.m. Monday through Friday.  The number listed above is our office number, and our phone automatically switches to our answering service if we are not there.  Please do not use the emergency room for nonurgent care.    Thank you for entrusting us with your care.  [facility]General Surgery       You have  been given a prescription for Norco 5/325  Norco was given to you at: 3:15PM  Next dose no sooner than four hours after first dose  Take this medication as directed  This medication contains Tylenol (acetaminophen)  Do not take additional Tylenol while taking Norco    Norco is a Narcotic and can be constipating or upset your stomach  Don't take Norco on an empty stomach  Drink plenty of water  Alcoholic beverages should be avoided while taking narcotics

## 2024-08-28 NOTE — H&P
Patient presents with lower abdominal discomfort.  Patient states she has had this for an extended period of time this prompted CT scan of the abdomen performed at [facility] which demonstrates umbilical hernia small hiatal hernia and 2 ventral abdominal wall hernias 7 cm from the midline with bowel contents.         .               Past Medical History       Past Medical History:    Abdominal distention    Abdominal hernia     Hiatel hernia    Abdominal pain     From intermitent constipation    Anxiety     After pacemaker    Arthritis     Had MRI of spine due to pain    Back pain     Did yoga to help and improved posture    Belching     After new medicaions with pacemaker    Bloating     Occasional    Bradycardia    Cardiac defibrillator in place    Congestive heart disease (HCC)    Constipation     More frequent with new medications    Diarrhea, unspecified     On and off less frequent now    Dizziness     Med increase caused dizzyness at cardio workout    Ejection fraction < 50%     15-20% 6/2021      Esophageal reflux    Flatulence/gas pain/belching     Occational    Food intolerance     Pay attention to what I eat    Frequent urination    Frequent use of laxatives     Senna soft gels and recently metamucil    Heart palpitations    Heartburn     Use  omeprazole or tums as needed    Hemorrhoids     Occationally    High blood pressure    High cholesterol    History of 2019 novel coronavirus disease (COVID-19)     No Hospitalizations. Symptoms: cough,loss of taste and smell,fatigue headache    Hx of motion sickness    Hyperlipidemia    Indigestion     Iccationally    Irregular bowel habits     Since new medications after surgery    Pacemaker    Pain with bowel movements     Sometimes when constipated    Sleep apnea    Sleep disturbance     Diagnosed with severe sleep apnea 8/18/21    Stool incontinence    Stress      diagnosed with cancer    Thyroid disease    Visual impairment     glasses,contacts     Wears glasses     Glasses since I entered 2nd grade    Weight loss     Doing cardio rehab lost 8 pounds         Past Surgical History         Past Surgical History:   Procedure Laterality Date    Cardiac defibrillator placement         [employer]    Cardiac pacemaker placement   6/21/21    Colonoscopy N/A 4/8/2022     Procedure: COLONOSCOPY;  Surgeon: Cash Cristobal DO;  Location:  ENDOSCOPY         Medications Ordered Prior to Encounter          Current Outpatient Medications on File Prior to Visit   Medication Sig Dispense Refill    levothyroxine 50 MCG Oral Tab TAKE 1 TABLET(50 MCG) BY MOUTH DAILY 90 tablet 0    FARXIGA 10 MG Oral Tab          Multiple Vitamin (MULTI-VITAMIN) Oral Tab multivitamin tablet, [RxNorm: 0] (Patient not taking: Reported on 5/30/2024)        clonazePAM 0.5 MG Oral Tab Take 0.5 tablets (0.25 mg total) by mouth 2 (two) times daily as needed for Anxiety. 20 tablet 0    rosuvastatin 20 MG Oral Tab Take 1 tablet (20 mg total) by mouth daily. 90 tablet 1    metoprolol succinate ER 50 MG Oral Tablet 24 Hr Take 1 tablet (50 mg total) by mouth daily. 90 tablet 1    albuterol 108 (90 Base) MCG/ACT Inhalation Aero Soln Inhale 1-2 puffs into the lungs every 4 (four) hours as needed for Wheezing or Shortness of Breath. (Patient not taking: Reported on 4/29/2024) 1 each 0    sacubitril-valsartan (ENTRESTO) 49-51 MG Oral Tab Take 1 tablet by mouth every morning. 90 tablet 0    sacubitril-valsartan (ENTRESTO)  MG Oral Tab Take 1 tablet by mouth every evening. 90 tablet 0    calcium carbonate 500 MG Oral Chew Tab Chew 1 tablet (500 mg total) by mouth daily as needed for Reflux.          No current facility-administered medications on file prior to visit.         @ALL@  Family History         Family History   Problem Relation Age of Onset    Heart Disorder Father           AAA    Diabetes Mother 85         Passed away in 2019    Hypertension Mother      Other (Other) Mother            thyroid    Cancer Maternal Grandfather           throat    Other (Other) Sister           thyroid    Diabetes Brother           Got taken off medication/improved his diet         Short Social Hx on File   Social History            Socioeconomic History    Marital status:    Tobacco Use    Smoking status: Never    Smokeless tobacco: Never   Vaping Use    Vaping status: Never Used   Substance and Sexual Activity    Alcohol use: Yes       Alcohol/week: 4.0 standard drinks of alcohol       Types: 4 Cans of beer per week       Comment: weekly    Drug use: No      Social Determinants of Health        [facility]ctions     [facility]     Housing Stability            ROS      GENERAL HEALTH: otherwise feels well, no weight loss, no fever or chills  SKIN: denies any unusual skin rashes or jaundice  HEENT: denies nasal congestion, sinus pain or sore throat; hearing loss negative,   EYES , no diplopia or vision changes  RESPIRATORY: denies shortness of breath, wheezing or cough   CARDIOVASCULAR: denies chest pain or MARKS; no palpitations   GI: denies nausea, vomiting, constipation, diarrhea; no rectal bleeding; no heartburn  GENITAL/: no dysuria, urgency or frequency, no tea colored urine  MUSCULOSKELETAL: no joint complaints upper or lower extremities  HEMATOLOGY: denies hx anemia; denies bruising or excessive bleeding  Endocrine: no weight gain or loss no hot or cold intolerance     EXAM      Pulse 73, temperature 97.2 °F (36.2 °C), SpO2 95%, not currently breastfeeding.  GENERAL: well developed, well nourished female, in no apparent distress.    MENTAL STATUS :Alert, oriented x 3  PSYCH: normal mood and affect  SKIN: anicteric, no rashes, no bruising  EYES: PERRLA, EOMI, sclera anicteric,  conjunctiva without pallor  HEENT: normocephalic, atraumatic, TMs clear, nares  patent, mouth moist, pharynx w/o erythema  NECK: supple, no JVD, no adenopathy, no thyromegaly  RESPIRATORY: clear to auscultation, no rales , rhonchi or wheezing  CARDIOVASCULAR: RRR, murmur negative  ABDOMEN: Standing Valsalva examination performed with Lori YUONG demonstrates evidence of tiny umbilical hernia and bilateral inguinal hernias present at the internal ring or bilateral spigelian hernias both are reducible  LYMPHATIC: no axillary , supraclavicular or inguinal lymphadenopathy  EXTREMITIES: no cyanosis, clubbing or edema, no atrophy, full ROM     STUDIES:              Refill on 05/04/2024   Component Date Value Ref Range Status    TSH 04/08/2024 1.970  mIU/mL Final         ASSESSMENT   Imp: Bilateral inguinal hernias at the internal ring with intestinal contents versus spigelian hernias.  Small umbilical hernia  PLan: Laparoscopic bilateral inguinal herniorrhaphy with mesh umbilical herniorrhaphy with possible mesh will examine and shahab patient at time of surgery to be clear on location of hernias in the lower abdomen.  Patient aware of risk to include bleeding infection pneumonia DVT and recurrence of the hernias.  All questions answered patient does have a pacemaker we will get cardiac clearance prior to surgery

## 2024-08-28 NOTE — ANESTHESIA PROCEDURE NOTES
Airway  Date/Time: 8/28/2024 11:18 AM  Urgency: elective      General Information and Staff    Patient location during procedure: OR  Anesthesiologist: Susan Kiran MD  Performed: anesthesiologist   Performed by: Susan Kiran MD  Authorized by: Susan Kiran MD      Indications and Patient Condition  Indications for airway management: anesthesia  Sedation level: deep  Preoxygenated: yes  Patient position: sniffing  Mask difficulty assessment: 1 - vent by mask    Final Airway Details  Final airway type: endotracheal airway      Successful airway: ETT  Cuffed: yes   Successful intubation technique: direct laryngoscopy  Endotracheal tube insertion site: oral  Blade: Maia  Blade size: #4  ETT size (mm): 7.0    Cormack-Lehane Classification: grade IIA - partial view of glottis  Placement verified by: capnometry   Measured from: lips  ETT to lips (cm): 21  Number of attempts at approach: 1

## 2024-08-28 NOTE — ANESTHESIA POSTPROCEDURE EVALUATION
[facility]    Maricruz De La Rosa Patient Status:  Hospital Outpatient Surgery   Age/Gender 63 year old female MRN [id number]   Location [facility] POST ANESTHESIA CARE UNIT Attending Yifan Melchor DO   Hosp Day # 0 PCP Loulou Santana MD       Anesthesia Post-op Note    BILATERAL LAPAROSCOPIC INGUINAL HERNIA REPAIR WITH MESH, UMBILICAL HERNIORRHAPHY    Procedure Summary       Date: 08/28/24 Room / Location:  MAIN OR [address] [place] [address] MAIN OR    Anesthesia Start: 1106 Anesthesia Stop: 1325    Procedure: BILATERAL LAPAROSCOPIC INGUINAL HERNIA REPAIR WITH MESH, UMBILICAL HERNIORRHAPHY (Bilateral) Diagnosis:       Bilateral inguinal hernia without obstruction or gangrene, recurrence not specified      Umbilical hernia without obstruction and without gangrene      (Bilateral inguinal hernia without obstruction or gangrene, recurrence not specified [K40.20]Umbilical hernia without obstruction and without gangrene [K42.9])    Surgeons: Yifan Melchor DO Anesthesiologist: Susan Kiran MD    Anesthesia Type: general ASA Status: 3            Anesthesia Type: general    Vitals Value Taken Time   /60 08/28/24 1344   Temp 97.5 °F (36.4 °C) 08/28/24 1325   Pulse 70 08/28/24 1349   Resp 28 08/28/24 1349   SpO2 91 % 08/28/24 1349   Vitals shown include unfiled device data.    Patient Location: PACU    Anesthesia Type: general    Airway Patency: patent    Postop Pain Control: adequate    Mental Status: preanesthetic baseline    Nausea/Vomiting: none    Cardiopulmonary/Hydration status: stable euvolemic    Complications: no apparent anesthesia related complications    Postop vital signs: stable    Dental Exam: Unchanged from Preop    Patient to be discharged from PACU when criteria met.

## 2024-08-28 NOTE — ANESTHESIA PREPROCEDURE EVALUATION
PRE-OP EVALUATION    Patient Name: Maricruz De La Rosa    Admit Diagnosis: Bilateral inguinal hernia without obstruction or gangrene, recurrence not specified [K40.20]  Umbilical hernia without obstruction and without gangrene [K42.9]    Pre-op Diagnosis: Bilateral inguinal hernia without obstruction or gangrene, recurrence not specified [K40.20]  Umbilical hernia without obstruction and without gangrene [K42.9]    BILATERAL LAPAROSCOPIC INGUINAL HERNIA REPAIR WITH MESH, UMBILICAL HERNIORRHAPHY POSSIBLE MESH    Anesthesia Procedure: BILATERAL LAPAROSCOPIC INGUINAL HERNIA REPAIR WITH MESH, UMBILICAL HERNIORRHAPHY POSSIBLE MESH (Bilateral)    Surgeons and Role:     * Yifan Melchor, DO - Primary    Pre-op vitals reviewed.  Temp: 98.5 °F (36.9 °C)  Pulse: 70  Resp: 16  BP: 128/75  SpO2: 99 %  Body mass index is 26.04 kg/m².    Current medications reviewed.  Hospital Medications:   [Transfer Hold] acetaminophen (Tylenol Extra Strength) tab 1,000 mg  1,000 mg Oral Once    scopolamine (Transderm-Scop) 1 MG/3DAYS patch 1 patch  1 patch Transdermal Once    lactated ringers infusion   Intravenous Continuous    [COMPLETED] heparin (Porcine) 5000 UNIT/ML injection 5,000 Units  5,000 Units Subcutaneous Once    ceFAZolin (Ancef) 2g in 10mL IV syringe premix  2 g Intravenous Once    scopolamine (Transderm-Scop) 1 MG/3DAYS patch        heparin (Porcine) 5000 UNIT/ML injection        ceFAZolin (Ancef) 2 g/10mL IV syringe premix           Outpatient Medications:     Medications Prior to Admission   Medication Sig Dispense Refill Last Dose    polyethylene glycol, PEG 3350, 17 g Oral Powd Pack Take 17 g by mouth daily.   Past Week    LEVOTHYROXINE 50 MCG Oral Tab TAKE 1 TABLET(50 MCG) BY MOUTH DAILY 90 tablet 0 8/28/2024 at 0600    clonazePAM 0.5 MG Oral Tab Take 0.5 tablets (0.25 mg total) by mouth 2 (two) times daily as needed for Anxiety. 20 tablet 0 8/27/2024 at 2200    rosuvastatin 20 MG Oral Tab TAKE 1 TABLET(20 MG) BY MOUTH DAILY  90 tablet 0 8/27/2024 at 1900    FARXIGA 10 MG Oral Tab    8/25/2024    Multiple Vitamin (MULTI-VITAMIN) Oral Tab    8/26/2024    metoprolol succinate ER 50 MG Oral Tablet 24 Hr Take 1 tablet (50 mg total) by mouth daily. 90 tablet 1 8/27/2024 at 1900    sacubitril-valsartan (ENTRESTO) 49-51 MG Oral Tab Take 1 tablet by mouth every morning. 90 tablet 0 8/27/2024 at 0700    sacubitril-valsartan (ENTRESTO)  MG Oral Tab Take 1 tablet by mouth every evening. 90 tablet 0 8/27/2024 at 1900    calcium carbonate 500 MG Oral Chew Tab Chew 1 tablet (500 mg total) by mouth daily as needed for Reflux.   8/27/2024 at 0900    albuterol 108 (90 Base) MCG/ACT Inhalation Aero Soln Inhale 1-2 puffs into the lungs every 4 (four) hours as needed for Wheezing or Shortness of Breath. 1 each 0 More than a month       Allergies: Sulfa antibiotics and Seasonal      Anesthesia Evaluation        Anesthetic Complications           GI/Hepatic/Renal      (+) GERD                           Cardiovascular  Comment: CMP, non ischemic. S/p AICD. Following by cards, cleared. Instructed to apply magnet on it for the duration of the surgery. Recent echo cardio on 12/2023 with EF of 50-55%          MET: >4      (+) hypertension           (+) pacemaker/AICD       (+) dysrhythmias and PVC  (+) CHF                Endo/Other                                  Pulmonary                    (+) sleep apnea       Neuro/Psych                                      Past Surgical History:   Procedure Laterality Date    Cardiac defibrillator placement      [employer]    Cardiac pacemaker placement  6/21/21    Colonoscopy N/A 4/8/2022    Procedure: COLONOSCOPY;  Surgeon: Cash Cristobal DO;  Location:  ENDOSCOPY     Social History     Socioeconomic History    Marital status:    Tobacco Use    Smoking status: Never    Smokeless tobacco: Never   Vaping Use    Vaping status: Never Used   Substance and Sexual Activity    Alcohol use: Yes      Alcohol/week: 4.0 standard drinks of alcohol     Types: 4 Cans of beer per week     Comment: weekly    Drug use: No     History   Drug Use No     Available pre-op labs reviewed.               Airway      Mallampati: II  Mouth opening: 3 FB  TM distance: 4 - 6 cm  Neck ROM: full Cardiovascular      Rhythm: regular       Dental  Comment: Denies loose teeth    Dental appliance(s): partials       Pulmonary      Breath sounds clear to auscultation bilaterally.               Other findings              ASA: 3   Plan: general  NPO status verified and     Post-procedure pain management plan discussed with surgeon and patient.      Plan/risks discussed with: patient                Present on Admission:  **None**

## 2024-08-28 NOTE — BRIEF OP NOTE
Pre-Operative Diagnosis: Bilateral inguinal hernia without obstruction or gangrene, recurrence not specified [K40.20]  Umbilical hernia without obstruction and without gangrene [K42.9]     Post-Operative Diagnosis: Bilateral inguinal hernia without obstruction or gangrene, recurrence not specified [K40.20]Umbilical hernia without obstruction and without gangrene [K42.9]      Procedure Performed:   BILATERAL LAPAROSCOPIC INGUINAL HERNIA REPAIR WITH MESH, UMBILICAL HERNIORRHAPHY    Surgeons and Role:     * Yifan Melchor DO - Primary    Assistant(s):  PA: Sumi Montalvo PA-C     Surgical Findings:      Specimen:      Estimated Blood Loss: Blood Output: 10 mL (8/28/2024  1:01 PM)      Dictation Number:      Yifan Melchor DO  8/28/2024  1:16 PM

## 2024-08-29 NOTE — OPERATIVE REPORT
[facility]    PATIENT'S NAME: CAMILO BANUELOS   ATTENDING PHYSICIAN: Yifan Melchor D.O.   OPERATING PHYSICIAN: Yifan Melchor D.O.   PATIENT ACCOUNT#:   [id number]    LOCATION:  [facility] 2 [facility] 10  MEDICAL RECORD #:   [id number]       YOB: 1960  ADMISSION DATE:       08/28/2024      OPERATION DATE:  08/28/2024    OPERATIVE REPORT    PREOPERATIVE DIAGNOSIS:  Bilateral inguinal hernias and umbilical hernia.  POSTOPERATIVE DIAGNOSIS:  Bilateral indirect inguinal hernias and umbilical hernia, intra-abdominal adhesions.  PROCEDURE:  Bilateral laparoscopic inguinal herniorrhaphy with mesh, lysis of intra-abdominal adhesions, and umbilical herniorrhaphy with primary repair.      ASSISTANT:  Sumi Montalvo PA-C    ANESTHESIA:  General.    COMPLICATIONS:  None.    OPERATIVE TECHNIQUE:  An informed consent was previously obtained.  The patient was taken to the operative suite and placed in a supine position.  General inhalational anesthetic was administered by the anesthesia department, and the anterior abdomen was prepped and draped in usual sterile fashion.  Hutton catheterization previously performed by nursing staff.    The abdomen was entered through the umbilicus.  A 2 cm skin incision was made superior to the umbilicus and carried along the left-hand side of the umbilicus.  Sharp dissection carried down through skin and subcutaneous tissue.  Hemostasis secured using handheld electrocautery.  The umbilical stalk was circumferentially dissected using Metzenbaum scissors, and the umbilical stalk was transected using handheld electrocautery.  The umbilical hernial contents then emanated up into the wound.    Part of the preperitoneal fatty tissue was excised and Kocher clamps placed on the fascial edges.  The abdomen was entered through the umbilical fascial defect, and a Amanda trocar was placed in the abdominal cavity.  Insufflation carried out of the abdominal cavity, and #5 trocars  placed at the right and left mid quadrant under laparoscopic visualization.  Attention was initially directed towards the right groin where patient had evidence of obvious indirect hernial defect.  She did have preperitoneal tissue reduced into the fascial defect.  The peritoneum 4 cm cephalad to the internal ring was grasped and tented in a posterior fashion and incised in a transverse fashion using Sonicision cautery device.  This was carried out to the level of the umbilical ligament.  Preperitoneal dissection was carried down to the pubic tubercle and along the anterior pubic ramus.    Attention was then directed towards the internal ring where the peritoneum was grasped as well as preperitoneal fatty tissue was grasped and reduced from the preperitoneal space.    This was carried out using both blunt and sharp dissection until the indirect hernial space was free of any preperitoneal fat, and the hernia sac was fully reduced.  Then, a 3 x 5 Prolene mesh was placed in the abdominal cavity.  It was tacked along the anterior pubic ramus, and lateral to the internal ring into the posterior rectus musculature.  The posterior peritoneum was then brought in apposition to the anterior peritoneum and tacked at 1 cm intervals.    Attention was then directed toward the left inguinal area, where the sigmoid colon was found to be adhesed to the left lower quadrant.  These adhesions were clear and were lysed using laparoscopic scissors, no cautery was utilized.    This was carried out until the sigmoid colon was fully reduced back into the abdominal cavity.  There was a slip of appendices epiploicae which was adherent to the base of the hernia sac on the left-hand side causing the sigmoid colon to go within the hernia sac.    The peritoneum at this location was divided using Sonicision cautery device.  Then, the indirect hernial defect was repaired in an identical fashion as described previously on the right-hand side and the  peritoneum approximated also as previously described.    Trocars were removed under reduced pneumoperitoneum.  The umbilicus was then evaluated.    The fascial defect was approximately 1.5 cm in size.  This was closed with interrupted 2-0 Ethibond suture.  The posterior umbilical skin was sutured to the fascia using interrupted 3-0 Vicryl suture, and skin edges approximated using 4-0 Monocryl in a running subdermal fashion, #5 trocars approximated using interrupted Monocryl in a subdermal interrupted fashion.  Mastisol, Steri-Strips, and 0.5% Marcaine with epinephrine injected to all incisions at the completion of the procedure.  Patient was transported from the operative suite to Recovery in stable condition.    Dictated By Yifan Melchor D.O.  d: 08/28/2024 13:21:53  t: 08/28/2024 18:27:14  [facility] [id number]  /

## 2024-08-30 RX ORDER — METOPROLOL SUCCINATE 50 MG/1
50 TABLET, EXTENDED RELEASE ORAL DAILY
Qty: 90 TABLET | Refills: 1 | Status: SHIPPED | OUTPATIENT
Start: 2024-08-30

## 2024-08-30 NOTE — TELEPHONE ENCOUNTER
Hypertension Medications Protocol [id number]/30/2024 10:38 AM   Protocol Details EGFRCR or GFRNAA > 50    CMP or BMP in past 12 months    Last BP reading less than 140/90    In person appointment or virtual visit in the past 12 mos           States she is out of medication  Future Appointments   Date Time Provider Department Center   9/11/2024 10:45 AM [facility] [id number]

## 2024-08-30 NOTE — TELEPHONE ENCOUNTER
Last office visit 5/30/24  Last refilled on 2/19/24 for # 90 with 1 refills  Future Appointments   Date Time Provider Department Center   9/11/2024 10:45 AM [facility] [id number]        Thank you.

## 2024-09-04 ENCOUNTER — OFFICE VISIT (OUTPATIENT)
Dept: FAMILY MEDICINE CLINIC | Facility: CLINIC | Age: 64
End: 2024-09-04
Payer: COMMERCIAL

## 2024-09-04 ENCOUNTER — LAB ENCOUNTER (OUTPATIENT)
Dept: LAB | Age: 64
End: 2024-09-04
Attending: NURSE PRACTITIONER
Payer: COMMERCIAL

## 2024-09-04 VITALS
WEIGHT: 146 LBS | DIASTOLIC BLOOD PRESSURE: 74 MMHG | HEART RATE: 72 BPM | BODY MASS INDEX: 26 KG/M2 | OXYGEN SATURATION: 97 % | SYSTOLIC BLOOD PRESSURE: 122 MMHG | TEMPERATURE: 97 F

## 2024-09-04 DIAGNOSIS — F51.05 INSOMNIA DUE TO OTHER MENTAL DISORDER: ICD-10-CM

## 2024-09-04 DIAGNOSIS — F99 INSOMNIA DUE TO OTHER MENTAL DISORDER: ICD-10-CM

## 2024-09-04 DIAGNOSIS — N81.4 UTERINE PROLAPSE: ICD-10-CM

## 2024-09-04 DIAGNOSIS — B37.2 CUTANEOUS CANDIDIASIS: Primary | ICD-10-CM

## 2024-09-04 DIAGNOSIS — E87.5 SERUM POTASSIUM ELEVATED: ICD-10-CM

## 2024-09-04 DIAGNOSIS — Z46.89 PESSARY MAINTENANCE: ICD-10-CM

## 2024-09-04 DIAGNOSIS — Z98.890 S/P HERNIA REPAIR: ICD-10-CM

## 2024-09-04 DIAGNOSIS — E03.9 ACQUIRED HYPOTHYROIDISM: ICD-10-CM

## 2024-09-04 DIAGNOSIS — Z87.19 S/P HERNIA REPAIR: ICD-10-CM

## 2024-09-04 LAB
ANION GAP SERPL CALC-SCNC: 13 MMOL/L (ref 0–18)
BUN BLD-MCNC: 14 MG/DL (ref 9–23)
CALCIUM BLD-MCNC: 10.3 MG/DL (ref 8.7–10.4)
CHLORIDE SERPL-SCNC: 102 MMOL/L (ref 98–112)
CO2 SERPL-SCNC: 20 MMOL/L (ref 21–32)
CREAT BLD-MCNC: 0.93 MG/DL
EGFRCR SERPLBLD CKD-EPI 2021: 68 ML/MIN/1.73M2 (ref 60–?)
FASTING STATUS PATIENT QL REPORTED: YES
GLUCOSE BLD-MCNC: 97 MG/DL (ref 70–99)
OSMOLALITY SERPL CALC.SUM OF ELEC: 280 MOSM/KG (ref 275–295)
POTASSIUM SERPL-SCNC: 4.5 MMOL/L (ref 3.5–5.1)
SODIUM SERPL-SCNC: 135 MMOL/L (ref 136–145)

## 2024-09-04 PROCEDURE — 99214 OFFICE O/P EST MOD 30 MIN: CPT | Performed by: NURSE PRACTITIONER

## 2024-09-04 PROCEDURE — 80048 BASIC METABOLIC PNL TOTAL CA: CPT | Performed by: NURSE PRACTITIONER

## 2024-09-04 RX ORDER — NYSTATIN 100000 U/G
1 CREAM TOPICAL 3 TIMES DAILY
Qty: 15 G | Refills: 0 | Status: SHIPPED | OUTPATIENT
Start: 2024-09-04 | End: 2024-09-09

## 2024-09-04 RX ORDER — LEVOTHYROXINE SODIUM 50 UG/1
50 TABLET ORAL DAILY
Qty: 90 TABLET | Refills: 0 | Status: SHIPPED | OUTPATIENT
Start: 2024-09-04

## 2024-09-04 RX ORDER — CLONAZEPAM 0.5 MG/1
0.25 TABLET ORAL 2 TIMES DAILY PRN
Qty: 20 TABLET | Refills: 0 | Status: SHIPPED | OUTPATIENT
Start: 2024-09-04

## 2024-09-04 RX ORDER — CHLORAL HYDRATE 500 MG
CAPSULE ORAL
COMMUNITY
Start: 2021-09-27

## 2024-09-04 RX ORDER — TRIAMCINOLONE ACETONIDE 5 MG/G
1 CREAM TOPICAL 3 TIMES DAILY
Qty: 15 G | Refills: 0 | Status: SHIPPED | OUTPATIENT
Start: 2024-09-04 | End: 2024-09-09

## 2024-09-04 NOTE — PROGRESS NOTES
HPI:     Patient is here for vaginal itching. Reports that it started 3 days ago but was really bad last night. Reports that it is itchy, red and burning. Denies abnormal discharge, odor, or blood in the urine. Reports that she does have a uterine prolapse but that there is a pressary ring in place. Patient had a hernia repair a week ago and was put on ancef after the procedure but patient reports that she has not taken it. Reports that she is not on anything at this time and only used a norco 3 times for pain right after the procedure. Patient used hydrogen peroxide on the area last night and reports that this helped. Has not tried treating with anything else.     Patient is also concerned that her potassium was high before the surgery at 5.3 and what she should do about this.     Current Outpatient Medications   Medication Sig Dispense Refill    omega-3 fatty acids 1000 MG Oral Cap Fish Oil 1,000 mg (120 mg-180 mg) capsule, [RxNorm: 0]      clonazePAM 0.5 MG Oral Tab Take 0.5 tablets (0.25 mg total) by mouth 2 (two) times daily as needed for Anxiety. 20 tablet 0    levothyroxine 50 MCG Oral Tab Take 1 tablet (50 mcg total) by mouth daily. 90 tablet 0    triamcinolone 0.5 % External Cream Apply 1 Application topically 3 (three) times daily for 5 days. 15 g 0    nystatin 100,000 Units/g External Cream Apply 1 Application topically in the morning, at noon, and at bedtime for 5 days. 15 g 0    METOPROLOL SUCCINATE ER 50 MG Oral Tablet 24 Hr TAKE 1 TABLET(50 MG) BY MOUTH DAILY 90 tablet 1    rosuvastatin 20 MG Oral Tab TAKE 1 TABLET(20 MG) BY MOUTH DAILY 90 tablet 0    FARXIGA 10 MG Oral Tab       Multiple Vitamin (MULTI-VITAMIN) Oral Tab       albuterol 108 (90 Base) MCG/ACT Inhalation Aero Soln Inhale 1-2 puffs into the lungs every 4 (four) hours as needed for Wheezing or Shortness of Breath. 1 each 0    sacubitril-valsartan (ENTRESTO) 49-51 MG Oral Tab Take 1 tablet by mouth every morning. 90 tablet 0     sacubitril-valsartan (ENTRESTO)  MG Oral Tab Take 1 tablet by mouth every evening. 90 tablet 0    calcium carbonate 500 MG Oral Chew Tab Chew 1 tablet (500 mg total) by mouth daily as needed for Reflux.      HYDROcodone-acetaminophen (NORCO) 5-325 MG Oral Tab Take 1 tablet by mouth every 6 (six) hours as needed for Pain. (Patient not taking: Reported on 9/4/2024) 15 tablet 0    polyethylene glycol, PEG 3350, 17 g Oral Powd Pack Take 17 g by mouth daily. (Patient not taking: Reported on 9/4/2024)        Past Medical History:    Abdominal distention    Abdominal hernia    Hiatel hernia    Abdominal pain    From intermitent constipation    Anxiety    After pacemaker    Arrhythmia    pvcs    Arthritis    Had MRI of spine due to pain    Back pain    Did yoga to help and improved posture    Back problem    low back pain    Belching    After new medicaions with pacemaker    Bloating    Occasional    Bradycardia    Cardiac defibrillator in place    Cardiomyopathy (HCC)    Congestive heart disease (HCC)    Constipation    More frequent with new medications    Diarrhea, unspecified    On and off less frequent now    Disorder of thyroid    Diverticulosis of large intestine    Dizziness    Med increase caused dizzyness at cardio workout    Ejection fraction < 50%    15-20% 6/2021      Esophageal reflux    Flatulence/gas pain/belching    Occational    Food intolerance    Pay attention to what I eat    Frequent urination    Frequent use of laxatives    Senna soft gels and recently metamucil    Hearing impaired person, bilateral    mild    Heart palpitations    Heartburn    Use  omeprazole or tums as needed    Hemorrhoids    Occationally    High blood pressure    High cholesterol    History of 2019 novel coronavirus disease (COVID-19)    No Hospitalizations. Symptoms: cough,loss of taste and smell,fatigue headache    Hx of motion sickness    Hyperlipidemia    Indigestion    Iccationally    Irregular bowel habits    Since new  medications after surgery    Pacemaker    Pain with bowel movements    Sometimes when constipated    Sleep apnea    no tx    Sleep disturbance    Diagnosed with severe sleep apnea 8/18/21    Stool incontinence    Stress     diagnosed with cancer    Thyroid disease    Visual impairment    glasses,contacts    Wears glasses    Glasses since I entered 2nd grade    Weight loss    Doing cardio rehab lost 8 pounds      Past Surgical History:   Procedure Laterality Date    Cardiac defibrillator placement      [employer]    Cardiac pacemaker placement  6/21/21    Colonoscopy N/A 4/8/2022    Procedure: COLONOSCOPY;  Surgeon: Cash Cristobal DO;  Location:  ENDOSCOPY      Family History   Problem Relation Age of Onset    Heart Disorder Father         AAA    Diabetes Mother 85        Passed away in 2019    Hypertension Mother     Other (Other) Mother         thyroid    Cancer Maternal Grandfather         throat    Other (Other) Sister         thyroid    Diabetes Brother         Got taken off medication/improved his diet      Social History     Socioeconomic History    Marital status:    Tobacco Use    Smoking status: Never    Smokeless tobacco: Never   Vaping Use    Vaping status: Never Used   Substance and Sexual Activity    Alcohol use: Yes     Alcohol/week: 4.0 standard drinks of alcohol     Types: 4 Cans of beer per week     Comment: weekly    Drug use: No     Social Determinants of Health      [facility]    Social Connections    Received from [facility], [facility]    Housing Stability         REVIEW OF SYSTEMS:   Review of Systems   Constitutional:  Negative for appetite change, fatigue and fever.   Respiratory:  Negative for cough, chest tightness and shortness of breath.    Cardiovascular:  Negative for chest pain and palpitations.   Gastrointestinal:  Negative for constipation, diarrhea,  nausea and vomiting.   Genitourinary:  Positive for frequency and vaginal pain. Negative for difficulty urinating, dysuria, hematuria and vaginal discharge.        Vaginal itching    Psychiatric/Behavioral:  Positive for sleep disturbance (improved with Clonazepam).        EXAM:   /74   Pulse 72   Temp 97.4 °F (36.3 °C)   Wt 146 lb (66.2 kg)   SpO2 97%   BMI 25.86 kg/m²   Physical Exam  Exam conducted with a chaperone present (Dalila Vera PA student).   Constitutional:       Appearance: Normal appearance. She is normal weight. She is not ill-appearing.   Cardiovascular:      Rate and Rhythm: Normal rate and regular rhythm.      Heart sounds: Normal heart sounds.   Pulmonary:      Effort: Pulmonary effort is normal.      Breath sounds: Normal breath sounds.   Abdominal:      General: Bowel sounds are normal. There is no distension.      Tenderness: There is no abdominal tenderness.   Genitourinary:     Labia:         Right: Rash present.         Left: Rash present.       Vagina: No vaginal discharge or erythema.      Uterus: With uterine prolapse.       Comments: Cutaneous excoriation consistent with candida  Neurological:      Mental Status: She is alert.         ASSESSMENT AND PLAN:   Diagnoses and all orders for this visit:    Cutaneous candidiasis  -     triamcinolone 0.5 % External Cream; Apply 1 Application topically 3 (three) times daily for 5 days.  -     nystatin 100,000 Units/g External Cream; Apply 1 Application topically in the morning, at noon, and at bedtime for 5 days.    Insomnia due to other mental disorder  -     clonazePAM 0.5 MG Oral Tab; Take 0.5 tablets (0.25 mg total) by mouth 2 (two) times daily as needed for Anxiety.    Acquired hypothyroidism  -     levothyroxine 50 MCG Oral Tab; Take 1 tablet (50 mcg total) by mouth daily.    Uterine prolapse  -     OBG Referral - [facility] ([place])    Pessary maintenance  -     OBG Referral - [facility] ([place])    Serum potassium elevated  -     Basic  Metabolic Panel (8) [E]; Future    S/P hernia repair    Discussed return to work restrictions and recommendations with surgeon     This patient was initially evaluated by Emily DOUGHERTY     Patient discussed with student. Independent exam performed. Agree with assessment and plan.

## 2024-09-05 ENCOUNTER — TELEPHONE (OUTPATIENT)
Dept: FAMILY MEDICINE CLINIC | Facility: CLINIC | Age: 64
End: 2024-09-05

## 2024-09-05 NOTE — TELEPHONE ENCOUNTER
----- Message from Rosy Haque sent at 9/5/2024  7:50 AM CDT -----  Results reviewed.   Potassium level is normal  Other chemistries are stable

## 2024-09-05 NOTE — TELEPHONE ENCOUNTER
Spoke with patient  - given lab result information.  Potassium normal  Other chemistries stable.

## 2024-09-11 ENCOUNTER — OFFICE VISIT (OUTPATIENT)
Facility: LOCATION | Age: 64
End: 2024-09-11
Payer: COMMERCIAL

## 2024-09-11 VITALS
HEART RATE: 76 BPM | BODY MASS INDEX: 25.87 KG/M2 | WEIGHT: 146 LBS | RESPIRATION RATE: 18 BRPM | HEIGHT: 63 IN | OXYGEN SATURATION: 96 % | TEMPERATURE: 98 F

## 2024-09-11 DIAGNOSIS — Z98.890 POST-OPERATIVE STATE: Primary | ICD-10-CM

## 2024-09-11 PROCEDURE — 99024 POSTOP FOLLOW-UP VISIT: CPT | Performed by: PHYSICIAN ASSISTANT

## 2024-09-11 NOTE — PROGRESS NOTES
Post Operative Visit Note       Active Problems  1. Post-operative state         Chief Complaint   Chief Complaint   Patient presents with    Post-Op      PO-08/28 w/rwm BILATERAL LAPAROSCOPIC INGUINAL HERNIA REPAIR WITH MESH, UMBILICAL HERNIORRHAPHY            History of Present Illness   63 year old female who presents today for postoperative visit following laparoscopic bilateral inguinal hernia repair on 8/28/2024 by Dr. Melchor.    The patient states that she is overall doing very well since surgery.  She reports mild incisional tenderness.  She denies nausea or vomiting.  She denies fever or chills.  She reports mild constipation.        Allergies  Maricruz is allergic to sulfa antibiotics and seasonal.    Past Medical / Surgical / Social / Family History    The past medical and past surgical history have been reviewed by me today.     Past Medical History:    Abdominal distention    Abdominal hernia    Hiatel hernia    Abdominal pain    From intermitent constipation    Anxiety    After pacemaker    Arrhythmia    pvcs    Arthritis    Had MRI of spine due to pain    Back pain    Did yoga to help and improved posture    Back problem    low back pain    Belching    After new medicaions with pacemaker    Bloating    Occasional    Bradycardia    Cardiac defibrillator in place    Cardiomyopathy (HCC)    Congestive heart disease (HCC)    Constipation    More frequent with new medications    Diarrhea, unspecified    On and off less frequent now    Disorder of thyroid    Diverticulosis of large intestine    Dizziness    Med increase caused dizzyness at cardio workout    Ejection fraction < 50%    15-20% 6/2021      Esophageal reflux    Flatulence/gas pain/belching    Occational    Food intolerance    Pay attention to what I eat    Frequent urination    Frequent use of laxatives    Senna soft gels and recently metamucil    Hearing impaired person, bilateral    mild    Heart palpitations    Heartburn    Use  omeprazole or  tums as needed    Hemorrhoids    Occationally    High blood pressure    High cholesterol    History of 2019 novel coronavirus disease (COVID-19)    No Hospitalizations. Symptoms: cough,loss of taste and smell,fatigue headache    Hx of motion sickness    Hyperlipidemia    Indigestion    Iccationally    Irregular bowel habits    Since new medications after surgery    Pacemaker    Pain with bowel movements    Sometimes when constipated    Sleep apnea    no tx    Sleep disturbance    Diagnosed with severe sleep apnea 8/18/21    Stool incontinence    Stress     diagnosed with cancer    Thyroid disease    Visual impairment    glasses,contacts    Wears glasses    Glasses since I entered 2nd grade    Weight loss    Doing cardio rehab lost 8 pounds     Past Surgical History:   Procedure Laterality Date    Cardiac defibrillator placement      [employer]    Cardiac pacemaker placement  6/21/21    Colonoscopy N/A 4/8/2022    Procedure: COLONOSCOPY;  Surgeon: Cash Cristobal DO;  Location:  ENDOSCOPY       The family history and social history have been reviewed by me today.    Family History   Problem Relation Age of Onset    Heart Disorder Father         AAA    Diabetes Mother 85        Passed away in 2019    Hypertension Mother     Other (Other) Mother         thyroid    Cancer Maternal Grandfather         throat    Other (Other) Sister         thyroid    Diabetes Brother         Got taken off medication/improved his diet     Social History     Socioeconomic History    Marital status:    Tobacco Use    Smoking status: Never    Smokeless tobacco: Never   Vaping Use    Vaping status: Never Used   Substance and Sexual Activity    Alcohol use: Yes     Alcohol/week: 4.0 standard drinks of alcohol     Types: 4 Cans of beer per week     Comment: weekly    Drug use: No        Current Outpatient Medications:     omega-3 fatty acids 1000 MG Oral Cap, Fish Oil 1,000 mg (120 mg-180 mg) capsule, [RxNorm: 0], Disp:  , Rfl:     clonazePAM 0.5 MG Oral Tab, Take 0.5 tablets (0.25 mg total) by mouth 2 (two) times daily as needed for Anxiety., Disp: 20 tablet, Rfl: 0    levothyroxine 50 MCG Oral Tab, Take 1 tablet (50 mcg total) by mouth daily., Disp: 90 tablet, Rfl: 0    METOPROLOL SUCCINATE ER 50 MG Oral Tablet 24 Hr, TAKE 1 TABLET(50 MG) BY MOUTH DAILY, Disp: 90 tablet, Rfl: 1    rosuvastatin 20 MG Oral Tab, TAKE 1 TABLET(20 MG) BY MOUTH DAILY, Disp: 90 tablet, Rfl: 0    FARXIGA 10 MG Oral Tab, , Disp: , Rfl:     Multiple Vitamin (MULTI-VITAMIN) Oral Tab, , Disp: , Rfl:     albuterol 108 (90 Base) MCG/ACT Inhalation Aero Soln, Inhale 1-2 puffs into the lungs every 4 (four) hours as needed for Wheezing or Shortness of Breath., Disp: 1 each, Rfl: 0    sacubitril-valsartan (ENTRESTO) 49-51 MG Oral Tab, Take 1 tablet by mouth every morning., Disp: 90 tablet, Rfl: 0    sacubitril-valsartan (ENTRESTO)  MG Oral Tab, Take 1 tablet by mouth every evening., Disp: 90 tablet, Rfl: 0    calcium carbonate 500 MG Oral Chew Tab, Chew 1 tablet (500 mg total) by mouth daily as needed for Reflux., Disp: , Rfl:       Review of Systems  A 10 point Review of Systems has been completed by me today and is negative except as above in the HPI.    Physical Findings   Pulse 76   Temp 98.1 °F (36.7 °C)   Resp 18   Ht 5' 3\" (1.6 m)   Wt 146 lb (66.2 kg)   SpO2 96%   BMI 25.86 kg/m²   Gen/psych: alert and oriented, cooperative, no apparent distress  Cardiovascular: regular rate  Respiratory: respirations unlabored, no wheeze  Abdominal: soft, non-tender, non-distended, no guarding/rebound  Incisions:  Incisions have healed well.  There is no redness or drainage noted.       Assessment/Plan  1. Post-operative state        The patient is doing well following laparoscopic bilateral inguinal hernia repair with mesh.  The patient is to continue with diet as tolerated.  She may add stool softeners daily as needed to avoid constipation.  The patient is to  continue with lifting restrictions of no more than 20 pounds for 6 weeks from the date of her surgery.  All questions and concerns were answered.  She is return to clinic as needed.     No orders of the defined types were placed in this encounter.       Imaging & Referrals   None    Follow Up  Return if symptoms worsen or fail to improve.    Courtney Aranda PA-C  [facility]  9/11/2024  10:56 AM

## 2024-09-13 ENCOUNTER — TELEMEDICINE (OUTPATIENT)
Dept: FAMILY MEDICINE CLINIC | Facility: CLINIC | Age: 64
End: 2024-09-13
Payer: COMMERCIAL

## 2024-09-13 DIAGNOSIS — U07.1 COVID-19: Primary | ICD-10-CM

## 2024-09-13 DIAGNOSIS — J06.9 ACUTE URI: ICD-10-CM

## 2024-09-13 LAB — AMB EXT COVID-19 RESULT: DETECTED

## 2024-09-13 NOTE — PROGRESS NOTES
Virtual/Telephone Check-In    Maricruz De La Rosa  verbally consents to a Virtual/Telephone Check-In service on 9/13/2024 .  Patient understands and accepts financial responsibility for any deductible, co-insurance and/or co-pays associated with this service.    This was a video visit with audio and visual connection via Internet connection call with the patient      Problem List Items Addressed This Visit    None  Visit Diagnoses       COVID-19    -  Primary    Relevant Medications    nirmatrelvir-ritonavir 300-100 MG Oral Tablet Therapy Pack    Acute URI               Chief Complaint   Patient presents with    Covid       Medications allergies and chart reviewed  COVID-19 protocol    HPI:   Maricruz De La Rosa is a 63 year old female who schedules a virtual visit with complaints of + COVID home test.    Began to have symptoms on Wednesday. See below  Using generic Coricidin with some relief. Feeling a little better today.     Allergies:  Allergies   Allergen Reactions    Sulfa Antibiotics RASH    Seasonal ITCHING       Current Outpatient Medications   Medication Sig Dispense Refill    nirmatrelvir-ritonavir 300-100 MG Oral Tablet Therapy Pack Take two nirmatrelvir tablets (300mg) with one ritonavir tablet (100mg) together twice daily for 5 days. 30 tablet 0    omega-3 fatty acids 1000 MG Oral Cap Fish Oil 1,000 mg (120 mg-180 mg) capsule, [RxNorm: 0]      clonazePAM 0.5 MG Oral Tab Take 0.5 tablets (0.25 mg total) by mouth 2 (two) times daily as needed for Anxiety. 20 tablet 0    levothyroxine 50 MCG Oral Tab Take 1 tablet (50 mcg total) by mouth daily. 90 tablet 0    METOPROLOL SUCCINATE ER 50 MG Oral Tablet 24 Hr TAKE 1 TABLET(50 MG) BY MOUTH DAILY 90 tablet 1    rosuvastatin 20 MG Oral Tab TAKE 1 TABLET(20 MG) BY MOUTH DAILY 90 tablet 0    FARXIGA 10 MG Oral Tab       Multiple Vitamin (MULTI-VITAMIN) Oral Tab       albuterol 108 (90 Base) MCG/ACT Inhalation Aero Soln Inhale 1-2 puffs into the lungs every 4 (four) hours  as needed for Wheezing or Shortness of Breath. 1 each 0    sacubitril-valsartan (ENTRESTO) 49-51 MG Oral Tab Take 1 tablet by mouth every morning. 90 tablet 0    sacubitril-valsartan (ENTRESTO)  MG Oral Tab Take 1 tablet by mouth every evening. 90 tablet 0    calcium carbonate 500 MG Oral Chew Tab Chew 1 tablet (500 mg total) by mouth daily as needed for Reflux.        Past Medical History:    Abdominal distention    Abdominal hernia    Hiatel hernia    Abdominal pain    From intermitent constipation    Anxiety    After pacemaker    Arrhythmia    pvcs    Arthritis    Had MRI of spine due to pain    Back pain    Did yoga to help and improved posture    Back problem    low back pain    Belching    After new medicaions with pacemaker    Bloating    Occasional    Bradycardia    Cardiac defibrillator in place    Cardiomyopathy (HCC)    Congestive heart disease (HCC)    Constipation    More frequent with new medications    Diarrhea, unspecified    On and off less frequent now    Disorder of thyroid    Diverticulosis of large intestine    Dizziness    Med increase caused dizzyness at cardio workout    Ejection fraction < 50%    15-20% 6/2021      Esophageal reflux    Flatulence/gas pain/belching    Occational    Food intolerance    Pay attention to what I eat    Frequent urination    Frequent use of laxatives    Senna soft gels and recently metamucil    Hearing impaired person, bilateral    mild    Heart palpitations    Heartburn    Use  omeprazole or tums as needed    Hemorrhoids    Occationally    High blood pressure    High cholesterol    History of 2019 novel coronavirus disease (COVID-19)    No Hospitalizations. Symptoms: cough,loss of taste and smell,fatigue headache    Hx of motion sickness    Hyperlipidemia    Indigestion    Iccationally    Irregular bowel habits    Since new medications after surgery    Pacemaker    Pain with bowel movements    Sometimes when constipated    Sleep apnea    no tx    Sleep  disturbance    Diagnosed with severe sleep apnea 8/18/21    Stool incontinence    Stress     diagnosed with cancer    Thyroid disease    Visual impairment    glasses,contacts    Wears glasses    Glasses since I entered 2nd grade    Weight loss    Doing cardio rehab lost 8 pounds      Past Surgical History:   Procedure Laterality Date    Cardiac defibrillator placement      [employer]    Cardiac pacemaker placement  6/21/21    Colonoscopy N/A 4/8/2022    Procedure: COLONOSCOPY;  Surgeon: Cash Cristobal DO;  Location:  ENDOSCOPY      Family History   Problem Relation Age of Onset    Heart Disorder Father         AAA    Diabetes Mother 85        Passed away in 2019    Hypertension Mother     Other (Other) Mother         thyroid    Cancer Maternal Grandfather         throat    Other (Other) Sister         thyroid    Diabetes Brother         Got taken off medication/improved his diet      Social History     Socioeconomic History    Marital status:    Tobacco Use    Smoking status: Never    Smokeless tobacco: Never   Vaping Use    Vaping status: Never Used   Substance and Sexual Activity    Alcohol use: Yes     Alcohol/week: 4.0 standard drinks of alcohol     Types: 4 Cans of beer per week     Comment: weekly    Drug use: No     Social Determinants of Health      [facility]    Social Connections    [facility]    Housing Stability         REVIEW OF SYSTEMS:   GENERAL: + chills, body aches, denies fever  HEENT: + runny nose, dry mouth, scratchy throat (better today)  LUNGS: + cough, denies SOB, chest heaviness, wheezing  CARDIOVASCULAR: denies chest pain, palpitations, unusual swelling in extremities  GI: some heartburn yesterday when she ate too fast; denies abdominal pain, nausea, vomiting  NEURO: + headaches, dizziness, LOC    EXAM:   VITALS: not available as this  is a telemed visit    GENERAL: Does not appear to be in acute distress  LUNG: No dyspnea with conversation  rest of physical exam unable to perform as this is a telemed visit    ASSESSMENT AND PLAN:     Encounter Diagnoses   Name Primary?    COVID-19 Yes    Acute URI      Problem List Items Addressed This Visit    None  Visit Diagnoses       COVID-19    -  Primary    Relevant Medications    nirmatrelvir-ritonavir 300-100 MG Oral Tablet Therapy Pack    Acute URI                Discussed supportive care  Antiviral sent. Will monitor symptoms today and overnight. If worsening, will take med. If continues to improve,may not take.  If she does take antiviral, hold statin for 10 days  The patient indicates understanding of these issues and agrees to the plan.  The patient is asked to go to ER if symptoms worsening.    Patient had an opportunity to ask questions and they were answered to her satisfaction.    \"Please note that this visit was completed using two-way, real-time interactive audio and/or video communication.  This has been done in good ivet to provide continuity of care in the best interest of the provider-patient relationship, due to the ongoing public health crisis/national emergency and because of restrictions of visitation.  There are limitations of this visit as no physical exam could be performed.  Every conscious effort was taken to allow for sufficient and adequate time.  This billing was spent on reviewing labs, medications, radiology tests and decision making.  Appropriate medical decision-making and tests are ordered as detailed in the plan of care above.\"      Rosy ARREAGA

## 2024-10-16 DIAGNOSIS — E03.9 ACQUIRED HYPOTHYROIDISM: ICD-10-CM

## 2024-10-16 RX ORDER — LEVOTHYROXINE SODIUM 50 UG/1
50 TABLET ORAL DAILY
Qty: 90 TABLET | Refills: 0 | Status: CANCELLED | OUTPATIENT
Start: 2024-10-16

## 2024-10-16 NOTE — TELEPHONE ENCOUNTER
Patient called requesting refill    levothyroxine 50 MCG Oral Tab     [id number] - [place], [place] - [address] AT [facility] [address],

## 2024-10-16 NOTE — TELEPHONE ENCOUNTER
Thyroid Medication Protocol Passed          Dispensed Written Strength Quantity Refills Days Supply Provider Pharmacy    LEVOTHYROXINE SODIUM  50 MCG TABS 10/16/2024 08/08/2024  30 tablet  30 Rosy Haque APRN [facility]   Last refill 9/4/24 90 0 refill    Called [facility] and spoke with pharmacist-1 refill on file-will get ready for patient to pickup

## 2024-10-31 DIAGNOSIS — F99 INSOMNIA DUE TO OTHER MENTAL DISORDER: ICD-10-CM

## 2024-10-31 DIAGNOSIS — F51.05 INSOMNIA DUE TO OTHER MENTAL DISORDER: ICD-10-CM

## 2024-10-31 RX ORDER — CLONAZEPAM 0.5 MG/1
0.25 TABLET ORAL 2 TIMES DAILY PRN
Qty: 20 TABLET | Refills: 0 | Status: SHIPPED | OUTPATIENT
Start: 2024-10-31

## 2024-10-31 NOTE — TELEPHONE ENCOUNTER
LOV: 9/4/24 for vaginal problem      clonazePAM 0.5 MG Oral Tab  Take 0.5 tablets (0.25 mg total) by mouth 2 (two) times daily as needed for Anxiety. Dispense: 20 tablet, Refills: 0 ordered        09/04/2024     Future Appointments   Date Time Provider Department Center   11/12/2024 10:40 AM Loulou Santana MD [facility]   12/10/2024  9:30 AM Ale Booth APN [facility]   12/18/2024  1:45 PM [id number]   12/18/2024  2:15 PM Hermann Saunders MD [facility] [id number]

## 2024-11-25 DIAGNOSIS — I42.0 DILATED CARDIOMYOPATHY (HCC): ICD-10-CM

## 2024-11-25 DIAGNOSIS — E03.9 ACQUIRED HYPOTHYROIDISM: ICD-10-CM

## 2024-11-25 DIAGNOSIS — E78.00 PURE HYPERCHOLESTEROLEMIA: ICD-10-CM

## 2024-11-25 RX ORDER — METOPROLOL SUCCINATE 50 MG/1
50 TABLET, EXTENDED RELEASE ORAL DAILY
Qty: 90 TABLET | Refills: 0 | Status: SHIPPED | OUTPATIENT
Start: 2024-11-25

## 2024-11-25 RX ORDER — LEVOTHYROXINE SODIUM 50 UG/1
50 TABLET ORAL DAILY
Qty: 90 TABLET | Refills: 0 | Status: SHIPPED | OUTPATIENT
Start: 2024-11-25

## 2024-11-25 RX ORDER — ROSUVASTATIN CALCIUM 20 MG/1
20 TABLET, COATED ORAL DAILY
Qty: 90 TABLET | Refills: 0 | Status: SHIPPED | OUTPATIENT
Start: 2024-11-25

## 2024-11-25 RX ORDER — DAPAGLIFLOZIN 10 MG/1
10 TABLET, FILM COATED ORAL DAILY
Qty: 90 TABLET | Refills: 1 | Status: SHIPPED | OUTPATIENT
Start: 2024-11-25

## 2024-11-25 NOTE — TELEPHONE ENCOUNTER
PT CALLED AND ADV IS IN [place] FOR ANOTHER 2 WEEKS OR SO AND IS IN NEED OF MEDS:    Levothyroxine 50 MCG Oral Tab     FARXIGA 10 MG Oral Tab     rosuvastatin 20 MG Oral Tab     METOPROLOL SUCCINATE ER 50 MG Oral Tablet 24 Hr     PLEASE ADV    THANK YOU   Sarecycline Pregnancy And Lactation Text: This medication is Pregnancy Category D and not consider safe during pregnancy. It is also excreted in breast milk.

## 2024-11-25 NOTE — TELEPHONE ENCOUNTER
Last office visit 9/4/24  Last refilled on 1/24/24 for # 90- with 3 refills  Future Appointments   Date Time Provider Department Center   12/10/2024  9:30 AM Ale Booth APN [facility] [place]   12/18/2024  1:45 PM [facility] [id number]   12/18/2024  2:15 PM Hermann Saunders MD [facility] [id number]   12/23/2024  9:00 AM Loulou Santana MD [facility] [place]        Thank you.

## 2024-11-25 NOTE — TELEPHONE ENCOUNTER
Thyroid Medication Protocol Passed          Cholesterol Medication Protocol Passed          Diabetes Medication Protocol [id number]/25/2024 12:47 PM   Protocol Details Last A1C < 7.5 and within past 6 months    Microalbumin procedure in past 12 months or taking ACE/ARB    In person appointment or virtual visit in the past 6 mos or appointment in next 3 mos    EGFRCR or GFRNAA > 50    GFR in the past 12 months          Hypertension Medications Protocol Passed        Last refill 1/24/24 90 3 refills

## 2024-11-26 DIAGNOSIS — E78.00 PURE HYPERCHOLESTEROLEMIA: ICD-10-CM

## 2024-11-26 RX ORDER — ROSUVASTATIN CALCIUM 20 MG/1
20 TABLET, COATED ORAL DAILY
Qty: 90 TABLET | Refills: 0 | OUTPATIENT
Start: 2024-11-26

## 2024-12-13 DIAGNOSIS — F99 INSOMNIA DUE TO OTHER MENTAL DISORDER: ICD-10-CM

## 2024-12-13 DIAGNOSIS — F51.05 INSOMNIA DUE TO OTHER MENTAL DISORDER: ICD-10-CM

## 2024-12-13 RX ORDER — CLONAZEPAM 0.5 MG/1
0.25 TABLET ORAL 2 TIMES DAILY PRN
Qty: 20 TABLET | Refills: 0 | Status: SHIPPED | OUTPATIENT
Start: 2024-12-13

## 2024-12-13 NOTE — TELEPHONE ENCOUNTER
Last office visit 9/4/24  Last refilled on 10/31/24 for # 20 with 0 refills  Future Appointments   Date Time Provider Department Center   12/18/2024  1:45 PM [facility] [id number]   12/18/2024  2:15 PM Hermann Saunders MD [facility] [id number]   12/19/2024 11:30 AM Guera Callaway,  [facility] [address]   12/23/2024  9:00 AM Loulou Santana MD [facility] [place]        Thank you.

## 2024-12-19 ENCOUNTER — OFFICE VISIT (OUTPATIENT)
Dept: OBGYN CLINIC | Facility: CLINIC | Age: 64
End: 2024-12-19
Payer: COMMERCIAL

## 2024-12-19 VITALS
DIASTOLIC BLOOD PRESSURE: 78 MMHG | WEIGHT: 148.63 LBS | HEART RATE: 78 BPM | HEIGHT: 63 IN | BODY MASS INDEX: 26.34 KG/M2 | SYSTOLIC BLOOD PRESSURE: 130 MMHG

## 2024-12-19 DIAGNOSIS — N81.4 UTERINE PROLAPSE: Primary | ICD-10-CM

## 2024-12-19 PROCEDURE — 99204 OFFICE O/P NEW MOD 45 MIN: CPT | Performed by: STUDENT IN AN ORGANIZED HEALTH CARE EDUCATION/TRAINING PROGRAM

## 2024-12-19 NOTE — PROGRESS NOTES
[facility]  Obstetrics and Gynecology Referral  History & Physical    CC: Patient is a new patient and referred for uterine prolapse and pessary management    Subjective:     HPI: Maricruz De La Rosa is a 63 year old  female referred for uterine prolapse and pessary management. Patient reports difficulty with removal of Mirena pessary.  States she was prescribed pessary for mild uterine prolapse, has not had pessary in for several months and noticed no significant difference or worsening in quality of life.  States it is hard for her to remove pessary but does not have difficulty with pessary insertion.  I recommended pessary use only if quality of life is worse regarding urinary or bowel changes or feeling bulge in vagina that is painful for patient.  Patient was not aware that she did not need to use pessary if she did not have bothersome symptoms.  Pessary was prescribed to her by urology.  Prefers to come to our office in [place] for pessary removal cleaning and insertion as this is closer for her.    OB History:  OB History    Para Term  AB Living   2 2           SAB IAB Ectopic Multiple Live Births                  # Outcome Date GA Lbr Eleazar/2nd Weight Sex Type Anes PTL Lv   2 Para            1 Para                Gyne History:  Menarche: 12  Period Cycle (Days): postmenopausal  Period Duration (Days): n/a  Period Flow: n/a  Hx Prior Abnormal Pap: No  Pap Date: 22  Pap Result Notes: wnl  No LMP recorded. Patient is postmenopausal.    Meds:  Medications Ordered Prior to Encounter[1]    All:  Allergies[2]    PMH:  Past Medical History:    Abdominal distention    Abdominal hernia    Hiatel hernia    Abdominal pain    From intermitent constipation    Anxiety    After pacemaker    Arrhythmia    pvcs    Arthritis    Had MRI of spine due to pain    Back pain    Did yoga to help and improved posture    Back problem    low back pain    Belching    After new medicaions with pacemaker     Bloating    Occasional    Bradycardia    Cardiac defibrillator in place    Cardiomyopathy (HCC)    Congestive heart disease (HCC)    Constipation    More frequent with new medications    Diarrhea, unspecified    On and off less frequent now    Disorder of thyroid    Diverticulosis of large intestine    Dizziness    Med increase caused dizzyness at cardio workout    Ejection fraction < 50%    15-20% 6/2021      Esophageal reflux    Flatulence/gas pain/belching    Occational    Food intolerance    Pay attention to what I eat    Frequent urination    Frequent use of laxatives    Senna soft gels and recently metamucil    Hearing impaired person, bilateral    mild    Heart palpitations    Heartburn    Use  omeprazole or tums as needed    Hemorrhoids    Occationally    High blood pressure    High cholesterol    History of 2019 novel coronavirus disease (COVID-19)    No Hospitalizations. Symptoms: cough,loss of taste and smell,fatigue headache    Hx of motion sickness    Hyperlipidemia    Indigestion    Iccationally    Irregular bowel habits    Since new medications after surgery    Pacemaker    Pain with bowel movements    Sometimes when constipated    Sleep apnea    no tx    Sleep disturbance    Diagnosed with severe sleep apnea 8/18/21    Stool incontinence    Stress     diagnosed with cancer    Thyroid disease    Visual impairment    glasses,contacts    Wears glasses    Glasses since I entered 2nd grade    Weight loss    Doing cardio rehab lost 8 pounds       PSH:  Past Surgical History:   Procedure Laterality Date    Cardiac defibrillator placement      [employer]    Cardiac pacemaker placement  6/21/21    Colonoscopy N/A 4/8/2022    Procedure: COLONOSCOPY;  Surgeon: Cash Cristobal DO;  Location:  ENDOSCOPY       Social History:  Social History     Socioeconomic History    Marital status:      Spouse name: Not on file    Number of children: Not on file    Years of education: Not on file     Highest education level: Not on file   Occupational History    Not on file   Tobacco Use    Smoking status: Never    Smokeless tobacco: Never   Vaping Use    Vaping status: Never Used   Substance and Sexual Activity    Alcohol use: Yes     Alcohol/week: 4.0 standard drinks of alcohol     Types: 4 Cans of beer per week     Comment: weekly    Drug use: No    Sexual activity: Not Currently   Other Topics Concern    Caffeine Concern Not Asked    Exercise Not Asked    Seat Belt Not Asked    Special Diet Not Asked    Stress Concern Not Asked    Weight Concern Not Asked   Social History Narrative    Not on file     Social Drivers of Health     Financial Resource Strain: Not on file   Food Insecurity: Not on file   Transportation Needs: Not on file   Physical Activity: Not on file   Stress: Not on file   Social Connections: Unknown (6/15/2021)    [facility], [facility]    Social Connections     Conversations with friends/family/neighbors per week: Not on file   Housing Stability: Low Risk  (7/9/2021)    Received from [facility], [facility]    Housing Stability     Mortgage Payment Concerns?: Not on file     Number of Places Lived in the Last Year: Not on file     Unstable Housing?: Not on file         Family History:  Family History   Problem Relation Age of Onset    Heart Disorder Father         AAA    Diabetes Mother 85        Passed away in 2019    Hypertension Mother     Other (Other) Mother         thyroid    Cancer Maternal Grandfather         throat    Other (Other) Sister         thyroid    Diabetes Brother         Got taken off medication/improved his diet       Review of Systems:  General: no complaints per category. See HPI for additional information.   Breast: no complaints per category. See HPI for additional information.   Respiratory: no complaints per category. See HPI for additional information.   Cardiovascular:  no complaints per category. See HPI for additional information.   GI: no complaints per category. See HPI for additional information.   : no complaints per category. See HPI for additional information.   Heme: no complaints per category. See HPI for additional information.       Objective:     Vitals:    24 1127   BP: 130/78   Pulse: 78   Weight: 148 lb 9.6 oz (67.4 kg)   Height: 63\"         Body mass index is 26.32 kg/m².    General: AAO.NAD.   CVS exam: normal peripheral perfusion  Chest: non-labored breathing, no tachypnea   Breast: deferred  Abdominal exam: soft, nontender, nondistended  Pelvic exam: deferred  Ext: non-tender, no edema    Imaging:      Assessment:     Maricruz De La Rosa is a 63 year old  female referred for uterine prolapse and pessary management.         Plan:     Problem List Items Addressed This Visit       Uterine prolapse - Primary         Uterine Prolapse  Pessary  - Patient here to discuss difficulty with pessary removal in setting of uterine prolapse  - Patient has not used pessary for several months, denies changes in quality of life  - Advised patient not to use pessary unless bothersome or worsening symptoms regarding urinary or bowel changes  - Patient can reinsert pessary if needed and then schedule 3-month follow-up in [place] clinic for pessary removal, cleaning, and pessary reinsertion        All of the findings and plan were discussed with the patient.  She notes understanding and agrees with the plan of care.  All questions were answered to the best of my ability at this time.      Total patient time was 30 minutes in evaluation, consultation, and coordination of care.  This included face to face and non-face to face actions. The patient's questions and concerns were addressed.     RTC in 1 year for a well woman exam or sooner if needed     Guera Callaway, DO  [facility] - OBGYN        Discussed with patient that there will not be further notification of normal or benign  results other than receiving results on [facility]. A [employer] message or telephone call will be placed by the physician and/or office staff if results are abnormal.     Note to patient and family   The 21st Century Cures Act makes medical notes available to patients in the interest of transparency.  However, please be advised that this is a medical document.  It is intended as [id number] communication.  It is written and medical language may contain abbreviations or verbiage that are technical and unfamiliar.  It may appear blunt or direct.  Medical documents are intended to carry relevant information, facts as evident, and the clinical opinion of the practitioner.        This note could include assistance by Dragon voice recognition. Errors in content may be related to improper recognition by the system; efforts to review and correct have been done but errors may still exist.          [1]   Current Outpatient Medications on File Prior to Visit   Medication Sig Dispense Refill    CLONAZEPAM 0.5 MG Oral Tab TAKE 1/2 TABLET(0.25 MG) BY MOUTH TWICE DAILY AS NEEDED FOR ANXIETY 20 tablet 0    levothyroxine 50 MCG Oral Tab Take 1 tablet (50 mcg total) by mouth daily. 90 tablet 0    rosuvastatin 20 MG Oral Tab Take 1 tablet (20 mg total) by mouth daily. 90 tablet 0    FARXIGA 10 MG Oral Tab Take 1 tablet (10 mg total) by mouth daily. 90 tablet 1    metoprolol succinate ER 50 MG Oral Tablet 24 Hr Take 1 tablet (50 mg total) by mouth daily. 90 tablet 0    Multiple Vitamin (MULTI-VITAMIN) Oral Tab       albuterol 108 (90 Base) MCG/ACT Inhalation Aero Soln Inhale 1-2 puffs into the lungs every 4 (four) hours as needed for Wheezing or Shortness of Breath. 1 each 0    sacubitril-valsartan (ENTRESTO) 49-51 MG Oral Tab Take 1 tablet by mouth every morning. 90 tablet 0    sacubitril-valsartan (ENTRESTO)  MG Oral Tab Take 1 tablet by mouth every evening. 90 tablet 0    calcium carbonate 500 MG Oral Chew Tab Chew 1 tablet (500  mg total) by mouth daily as needed for Reflux.      omega-3 fatty acids 1000 MG Oral Cap Fish Oil 1,000 mg (120 mg-180 mg) capsule, [RxNorm: 0] (Patient not taking: Reported on 12/19/2024)      FARXIGA 10 MG Oral Tab        No current facility-administered medications on file prior to visit.   [2]   Allergies  Allergen Reactions    Sulfa Antibiotics RASH    Seasonal ITCHING

## 2024-12-23 ENCOUNTER — OFFICE VISIT (OUTPATIENT)
Dept: FAMILY MEDICINE CLINIC | Facility: CLINIC | Age: 64
End: 2024-12-23
Payer: COMMERCIAL

## 2024-12-23 VITALS
HEART RATE: 76 BPM | TEMPERATURE: 97 F | OXYGEN SATURATION: 98 % | HEIGHT: 63 IN | WEIGHT: 146.63 LBS | BODY MASS INDEX: 25.98 KG/M2 | DIASTOLIC BLOOD PRESSURE: 76 MMHG | SYSTOLIC BLOOD PRESSURE: 136 MMHG

## 2024-12-23 DIAGNOSIS — E03.9 ACQUIRED HYPOTHYROIDISM: Primary | ICD-10-CM

## 2024-12-23 DIAGNOSIS — I42.0 DILATED CARDIOMYOPATHY (HCC): ICD-10-CM

## 2024-12-23 DIAGNOSIS — E78.00 PURE HYPERCHOLESTEROLEMIA: ICD-10-CM

## 2024-12-23 PROCEDURE — 99214 OFFICE O/P EST MOD 30 MIN: CPT | Performed by: FAMILY MEDICINE

## 2024-12-23 RX ORDER — DAPAGLIFLOZIN 10 MG/1
10 TABLET, FILM COATED ORAL DAILY
Qty: 30 TABLET | Refills: 11 | Status: SHIPPED | OUTPATIENT
Start: 2024-12-23

## 2024-12-23 RX ORDER — ROSUVASTATIN CALCIUM 20 MG/1
20 TABLET, COATED ORAL DAILY
Qty: 30 TABLET | Refills: 11 | Status: SHIPPED | OUTPATIENT
Start: 2024-12-23

## 2024-12-23 RX ORDER — LEVOTHYROXINE SODIUM 50 UG/1
50 TABLET ORAL DAILY
Qty: 30 TABLET | Refills: 11 | Status: SHIPPED | OUTPATIENT
Start: 2024-12-23

## 2024-12-23 RX ORDER — METOPROLOL SUCCINATE 50 MG/1
50 TABLET, EXTENDED RELEASE ORAL DAILY
Qty: 30 TABLET | Refills: 11 | Status: SHIPPED | OUTPATIENT
Start: 2024-12-23

## 2024-12-23 NOTE — PROGRESS NOTES
Chief Complaint   Patient presents with    Follow - Up     Follow up on meds and general info.        HPI:    Patient ID: Maricruz De La Rosa is a 64 year old female.    HPI   Follow up on meds  Hypothroidism on meds  Hyperlipidemia on meds  Hypertension on meds. Seeing cardiology  Concern for add. She has talked with cardiology and they are ok with her. She has done couselling.   Reviewed labs.   She is doing healthy diet and exercise.  Seeing cardiology next wk.   Vacciine prevnar and shingrix after holidays.     Review of Systems  Neg chest pain sob      Current Outpatient Medications   Medication Sig Dispense Refill    cholecalciferol 125 MCG (5000 UT) Oral Tab Take 1 tablet (5,000 Units total) by mouth daily.      FARXIGA 10 MG Oral Tab Take 1 tablet (10 mg total) by mouth daily. 30 tablet 11    levothyroxine 50 MCG Oral Tab Take 1 tablet (50 mcg total) by mouth daily. 30 tablet 11    metoprolol succinate ER 50 MG Oral Tablet 24 Hr Take 1 tablet (50 mg total) by mouth daily. 30 tablet 11    rosuvastatin 20 MG Oral Tab Take 1 tablet (20 mg total) by mouth daily. 30 tablet 11    CLONAZEPAM 0.5 MG Oral Tab TAKE 1/2 TABLET(0.25 MG) BY MOUTH TWICE DAILY AS NEEDED FOR ANXIETY 20 tablet 0    Multiple Vitamin (MULTI-VITAMIN) Oral Tab       sacubitril-valsartan (ENTRESTO) 49-51 MG Oral Tab Take 1 tablet by mouth every morning. 90 tablet 0    sacubitril-valsartan (ENTRESTO)  MG Oral Tab Take 1 tablet by mouth every evening. 90 tablet 0    calcium carbonate 500 MG Oral Chew Tab Chew 1 tablet (500 mg total) by mouth daily as needed for Reflux.      omega-3 fatty acids 1000 MG Oral Cap Fish Oil 1,000 mg (120 mg-180 mg) capsule, [RxNorm: 0] (Patient not taking: Reported on 12/23/2024)       Allergies:Allergies[1]    HISTORY:  Past Medical History:    Abdominal distention    Abdominal hernia    Hiatel hernia    Abdominal pain    From intermitent constipation    Anxiety    After pacemaker    Arrhythmia    pvcs    Arthritis     Had MRI of spine due to pain    Back pain    Did yoga to help and improved posture    Back problem    low back pain    Belching    After new medicaions with pacemaker    Bloating    Occasional    Bradycardia    Cardiac defibrillator in place    Cardiomyopathy (HCC)    Congestive heart disease (HCC)    Constipation    More frequent with new medications    Diarrhea, unspecified    On and off less frequent now    Disorder of thyroid    Diverticulosis of large intestine    Dizziness    Med increase caused dizzyness at cardio workout    Ejection fraction < 50%    15-20% 6/2021      Esophageal reflux    Flatulence/gas pain/belching    Occational    Food intolerance    Pay attention to what I eat    Frequent urination    Frequent use of laxatives    Senna soft gels and recently metamucil    Hearing impaired person, bilateral    mild    Heart palpitations    Heartburn    Use  omeprazole or tums as needed    Hemorrhoids    Occationally    High blood pressure    High cholesterol    History of 2019 novel coronavirus disease (COVID-19)    No Hospitalizations. Symptoms: cough,loss of taste and smell,fatigue headache    Hx of motion sickness    Hyperlipidemia    Indigestion    Iccationally    Irregular bowel habits    Since new medications after surgery    Pacemaker    Pain with bowel movements    Sometimes when constipated    Sleep apnea    no tx    Sleep disturbance    Diagnosed with severe sleep apnea 8/18/21    Stool incontinence    Stress     diagnosed with cancer    Thyroid disease    Visual impairment    glasses,contacts    Wears glasses    Glasses since I entered 2nd grade    Weight loss    Doing cardio rehab lost 8 pounds      Past Surgical History:   Procedure Laterality Date    Cardiac defibrillator placement      [employer]    Cardiac pacemaker placement  6/21/21    Colonoscopy N/A 4/8/2022    Procedure: COLONOSCOPY;  Surgeon: Cash Cristobal DO;  Location:  ENDOSCOPY      Family History   Problem  Relation Age of Onset    Heart Disorder Father         AAA    Diabetes Mother 85        Passed away in 2019    Hypertension Mother     Other (Other) Mother         thyroid    Cancer Maternal Grandfather         throat    Other (Other) Sister         thyroid    Diabetes Brother         Got taken off medication/improved his diet      Social History:   Social History     Socioeconomic History    Marital status:    Tobacco Use    Smoking status: Never    Smokeless tobacco: Never   Vaping Use    Vaping status: Never Used   Substance and Sexual Activity    Alcohol use: Yes     Alcohol/week: 4.0 standard drinks of alcohol     Types: 4 Cans of beer per week     Comment: weekly    Drug use: No    Sexual activity: Not Currently     Social Drivers of Health      Received from [facility], [facility]    Social Connections    Received from [facility], [facility]    Housing Stability        PHYSICAL EXAM:    /76 (BP Location: Right arm, Patient Position: Sitting, Cuff Size: adult)   Pulse 76   Temp 97 °F (36.1 °C)   Ht 5' 3\" (1.6 m)   Wt 146 lb 9.6 oz (66.5 kg)   SpO2 98%   BMI 25.97 kg/m²    Physical Exam  Constitutional:       General: She is not in acute distress.     Appearance: She is not ill-appearing.   Cardiovascular:      Rate and Rhythm: Normal rate and regular rhythm.      Pulses: Normal pulses.      Heart sounds: Normal heart sounds.   Pulmonary:      Effort: Pulmonary effort is normal.      Breath sounds: Normal breath sounds.   Skin:     General: Skin is warm.      Capillary Refill: Capillary refill takes less than 2 seconds.   Neurological:      Mental Status: She is alert.              ASSESSMENT/PLAN:   1. Acquired hypothyroidism  Continue meds    - levothyroxine 50 MCG Oral Tab; Take 1 tablet (50 mcg total) by mouth daily.  Dispense: 30 tablet; Refill: 11    2. Pure hypercholesterolemia  Continue meds  -  rosuvastatin 20 MG Oral Tab; Take 1 tablet (20 mg total) by mouth daily.  Dispense: 30 tablet; Refill: 11    3. Dilated cardiomyopathy (HCC)   As above  - FARXIGA 10 MG Oral Tab; Take 1 tablet (10 mg total) by mouth daily.  Dispense: 30 tablet; Refill: 11  - metoprolol succinate ER 50 MG Oral Tablet 24 Hr; Take 1 tablet (50 mg total) by mouth daily.  Dispense: 30 tablet; Refill: 11             No follow-ups on file.        [1]   Allergies  Allergen Reactions    Sulfa Antibiotics RASH    Seasonal ITCHING

## 2025-01-20 DIAGNOSIS — F51.05 INSOMNIA DUE TO OTHER MENTAL DISORDER: ICD-10-CM

## 2025-01-20 DIAGNOSIS — F99 INSOMNIA DUE TO OTHER MENTAL DISORDER: ICD-10-CM

## 2025-01-21 RX ORDER — CLONAZEPAM 0.5 MG/1
0.25 TABLET ORAL 2 TIMES DAILY PRN
Qty: 20 TABLET | Refills: 0 | Status: SHIPPED | OUTPATIENT
Start: 2025-01-21

## 2025-01-21 NOTE — TELEPHONE ENCOUNTER
Clonazepam last refilled 12/13/24  No future appointment in office  LOV with  12/23/24      Controlled Substance Medication [id number]/20/2025 06:32 PM    This medication is a controlled substance - forward to provider to refill    Medication is active on med list

## 2025-02-18 DIAGNOSIS — F51.05 INSOMNIA DUE TO OTHER MENTAL DISORDER: ICD-10-CM

## 2025-02-18 DIAGNOSIS — F99 INSOMNIA DUE TO OTHER MENTAL DISORDER: ICD-10-CM

## 2025-02-18 DIAGNOSIS — E78.00 PURE HYPERCHOLESTEROLEMIA: ICD-10-CM

## 2025-02-18 RX ORDER — ROSUVASTATIN CALCIUM 20 MG/1
20 TABLET, COATED ORAL DAILY
Qty: 90 TABLET | Refills: 0 | OUTPATIENT
Start: 2025-02-18

## 2025-02-18 NOTE — TELEPHONE ENCOUNTER
Clonazepam last refilled 1/21/25  Future appointment with Sofia tomorrow 2/19/25  LOV with  12/23/24      Controlled Substance Medication [id number]/18/2025 10:36 AM    This medication is a controlled substance - forward to provider to refill    Medication is active on med list

## 2025-02-19 ENCOUNTER — OFFICE VISIT (OUTPATIENT)
Dept: FAMILY MEDICINE CLINIC | Facility: CLINIC | Age: 65
End: 2025-02-19
Payer: COMMERCIAL

## 2025-02-19 VITALS
DIASTOLIC BLOOD PRESSURE: 70 MMHG | TEMPERATURE: 97 F | HEIGHT: 63 IN | OXYGEN SATURATION: 97 % | WEIGHT: 149 LBS | SYSTOLIC BLOOD PRESSURE: 116 MMHG | HEART RATE: 85 BPM | BODY MASS INDEX: 26.4 KG/M2

## 2025-02-19 DIAGNOSIS — E78.1 HYPERTRIGLYCERIDEMIA: ICD-10-CM

## 2025-02-19 DIAGNOSIS — I42.0 DILATED CARDIOMYOPATHY (HCC): ICD-10-CM

## 2025-02-19 DIAGNOSIS — R05.1 ACUTE COUGH: Primary | ICD-10-CM

## 2025-02-19 DIAGNOSIS — E03.9 ACQUIRED HYPOTHYROIDISM: ICD-10-CM

## 2025-02-19 DIAGNOSIS — I50.9 CHRONIC CONGESTIVE HEART FAILURE, UNSPECIFIED HEART FAILURE TYPE (HCC): ICD-10-CM

## 2025-02-19 PROCEDURE — 99213 OFFICE O/P EST LOW 20 MIN: CPT | Performed by: NURSE PRACTITIONER

## 2025-02-19 RX ORDER — CLONAZEPAM 0.5 MG/1
0.25 TABLET ORAL 2 TIMES DAILY PRN
Qty: 20 TABLET | Refills: 0 | Status: SHIPPED | OUTPATIENT
Start: 2025-02-19

## 2025-02-19 RX ORDER — BENZONATATE 200 MG/1
200 CAPSULE ORAL 3 TIMES DAILY PRN
Qty: 30 CAPSULE | Refills: 0 | Status: SHIPPED | OUTPATIENT
Start: 2025-02-19

## 2025-02-19 NOTE — PROGRESS NOTES
HPI:     Here for 2 concerns    Discuss Entresto prescription. Not fully covered by her insurance. May be close to $600 per month. They may also be dropping her from Medicare part D. Managed by cardiologist. She called their office and waiting to hear back from their office. Hoping they have samples for her.   Slight cough for a couple of days. No shortness of breath or wheezing.   Tailbone pain. Fell a couple of weeks ago. Going to chiropractor . Has had a couple of adjustments and it has been helping. No imaging previous.     Current Outpatient Medications   Medication Sig Dispense Refill    CLONAZEPAM 0.5 MG Oral Tab TAKE 1/2 TABLET(0.25 MG) BY MOUTH TWICE DAILY AS NEEDED FOR ANXIETY 20 tablet 0    benzonatate 200 MG Oral Cap Take 1 capsule (200 mg total) by mouth 3 (three) times daily as needed. 30 capsule 0    cholecalciferol 125 MCG (5000 UT) Oral Tab Take 1 tablet (5,000 Units total) by mouth daily.      FARXIGA 10 MG Oral Tab Take 1 tablet (10 mg total) by mouth daily. 30 tablet 11    levothyroxine 50 MCG Oral Tab Take 1 tablet (50 mcg total) by mouth daily. 30 tablet 11    metoprolol succinate ER 50 MG Oral Tablet 24 Hr Take 1 tablet (50 mg total) by mouth daily. 30 tablet 11    rosuvastatin 20 MG Oral Tab Take 1 tablet (20 mg total) by mouth daily. 30 tablet 11    omega-3 fatty acids 1000 MG Oral Cap       Multiple Vitamin (MULTI-VITAMIN) Oral Tab       sacubitril-valsartan (ENTRESTO) 49-51 MG Oral Tab Take 1 tablet by mouth every morning. 90 tablet 0    sacubitril-valsartan (ENTRESTO)  MG Oral Tab Take 1 tablet by mouth every evening. 90 tablet 0    calcium carbonate 500 MG Oral Chew Tab Chew 1 tablet (500 mg total) by mouth daily as needed for Reflux.        Past Medical History:    Abdominal distention    Abdominal hernia    Hiatel hernia    Abdominal pain    From intermitent constipation    Anxiety    After pacemaker    Arrhythmia    pvcs    Arthritis    Had MRI of spine due to pain    Back pain     Did yoga to help and improved posture    Back problem    low back pain    Belching    After new medicaions with pacemaker    Bloating    Occasional    Bradycardia    Cardiac defibrillator in place    Cardiomyopathy (HCC)    Congestive heart disease (HCC)    Constipation    More frequent with new medications    Diarrhea, unspecified    On and off less frequent now    Disorder of thyroid    Diverticulosis of large intestine    Dizziness    Med increase caused dizzyness at cardio workout    Ejection fraction < 50%    15-20% 6/2021      Esophageal reflux    Flatulence/gas pain/belching    Occational    Food intolerance    Pay attention to what I eat    Frequent urination    Frequent use of laxatives    Senna soft gels and recently metamucil    Hearing impaired person, bilateral    mild    Heart palpitations    Heartburn    Use  omeprazole or tums as needed    Hemorrhoids    Occationally    High blood pressure    High cholesterol    History of 2019 novel coronavirus disease (COVID-19)    No Hospitalizations. Symptoms: cough,loss of taste and smell,fatigue headache    Hx of motion sickness    Hyperlipidemia    Indigestion    Iccationally    Irregular bowel habits    Since new medications after surgery    Pacemaker    Pain with bowel movements    Sometimes when constipated    Sleep apnea    no tx    Sleep disturbance    Diagnosed with severe sleep apnea 8/18/21    Stool incontinence    Stress     diagnosed with cancer    Thyroid disease    Visual impairment    glasses,contacts    Wears glasses    Glasses since I entered 2nd grade    Weight loss    Doing cardio rehab lost 8 pounds      Past Surgical History:   Procedure Laterality Date    Cardiac defibrillator placement      [employer]    Cardiac pacemaker placement  6/21/21    Colonoscopy N/A 4/8/2022    Procedure: COLONOSCOPY;  Surgeon: Cash Cristobal DO;  Location:  ENDOSCOPY      Family History   Problem Relation Age of Onset    Heart Disorder  Father         AAA    Diabetes Mother 85        Passed away in 2019    Hypertension Mother     Other (Other) Mother         thyroid    Cancer Maternal Grandfather         throat    Other (Other) Sister         thyroid    Diabetes Brother         Got taken off medication/improved his diet      Social History     Socioeconomic History    Marital status:    Tobacco Use    Smoking status: Never    Smokeless tobacco: Never   Vaping Use    Vaping status: Never Used   Substance and Sexual Activity    Alcohol use: Yes     Alcohol/week: 4.0 standard drinks of alcohol     Types: 4 Cans of beer per week     Comment: weekly    Drug use: No    Sexual activity: Not Currently     Social Drivers of Health      [facility]    Housing Stability         REVIEW OF SYSTEMS:   Review of Systems   Constitutional:  Negative for chills, fatigue, fever and unexpected weight change.   HENT:  Positive for congestion (chronically in winter months) and rhinorrhea (for past month or so during cold months). Negative for ear pain, sore throat and trouble swallowing.    Respiratory:  Positive for cough. Negative for chest tightness, shortness of breath and wheezing.         Dry   Cardiovascular:  Negative for chest pain, palpitations and leg swelling.       EXAM:   /70   Pulse 85   Temp 97.2 °F (36.2 °C)   Ht 5' 3\" (1.6 m)   Wt 149 lb (67.6 kg)   SpO2 97%   BMI 26.39 kg/m²   Physical Exam  Constitutional:       General: She is not in acute distress.     Appearance: Normal appearance. She is not ill-appearing.   HENT:      Right Ear: Tympanic membrane, ear canal and external ear normal.      Left Ear: Tympanic membrane, ear canal and external ear normal.      Nose: Nose normal.      Mouth/Throat:      Mouth: Mucous membranes are moist.      Pharynx: Oropharynx is clear.   Eyes:      Conjunctiva/sclera: Conjunctivae normal.   Cardiovascular:      Rate and Rhythm:  Normal rate and regular rhythm.      Heart sounds: Normal heart sounds.   Pulmonary:      Effort: Pulmonary effort is normal.      Breath sounds: Normal breath sounds.   Abdominal:      General: Bowel sounds are normal.      Palpations: Abdomen is soft.      Tenderness: There is no abdominal tenderness.   Musculoskeletal:         General: Normal range of motion.   Neurological:      Mental Status: She is alert.         ASSESSMENT AND PLAN:   Diagnoses and all orders for this visit:    Acute cough  Comments:  likely viral. PRN Tessalon sent, this has worked well for her in past, supportive care discussed  Orders:  -     CBC W Differential W Platelet [E]; Future  -     benzonatate 200 MG Oral Cap; Take 1 capsule (200 mg total) by mouth 3 (three) times daily as needed.    Dilated cardiomyopathy (HCC)  Comments:  Following with cardiology, EF initially 15% with improvement to 69% by recent echo (12/2024), stable on Entresto and Farxiga  Orders:  -     CBC W Differential W Platelet [E]; Future    Chronic congestive heart failure, unspecified heart failure type (HCC)  Comments:  following with cardiology  Orders:  -     CBC W Differential W Platelet [E]; Future    Acquired hypothyroidism  Comments:  repeat lab work ordered  Orders:  -     TSH W REFLEX TO FREE T4 [23947][Q]; Future    Hypertriglyceridemia  Comments:  due for repeat lipid level, will send to cardiology as well  Orders:  -     Comp Metabolic Panel (14) [E]; Future  -     Lipid Panel [E]; Future

## 2025-03-19 ENCOUNTER — TELEPHONE (OUTPATIENT)
Dept: FAMILY MEDICINE CLINIC | Facility: CLINIC | Age: 65
End: 2025-03-19

## 2025-03-19 DIAGNOSIS — Z12.31 ENCOUNTER FOR SCREENING MAMMOGRAM FOR MALIGNANT NEOPLASM OF BREAST: Primary | ICD-10-CM

## 2025-03-19 NOTE — TELEPHONE ENCOUNTER
Patient would like an order for her Mammogram that will be due in April.  Patients last OV 2/19/25  Annual px is not due until 5/30/25

## 2025-03-21 DIAGNOSIS — I42.0 DILATED CARDIOMYOPATHY (HCC): ICD-10-CM

## 2025-03-21 DIAGNOSIS — E78.00 PURE HYPERCHOLESTEROLEMIA: ICD-10-CM

## 2025-03-21 DIAGNOSIS — E03.9 ACQUIRED HYPOTHYROIDISM: ICD-10-CM

## 2025-03-21 RX ORDER — ROSUVASTATIN CALCIUM 20 MG/1
20 TABLET, COATED ORAL DAILY
Qty: 90 TABLET | Refills: 0 | Status: SHIPPED | OUTPATIENT
Start: 2025-03-21 | End: 2025-05-28

## 2025-03-21 RX ORDER — DAPAGLIFLOZIN 10 MG/1
10 TABLET, FILM COATED ORAL DAILY
Qty: 90 TABLET | Refills: 2 | Status: SHIPPED | OUTPATIENT
Start: 2025-03-21

## 2025-03-21 RX ORDER — METOPROLOL SUCCINATE 50 MG/1
50 TABLET, EXTENDED RELEASE ORAL DAILY
Qty: 90 TABLET | Refills: 0 | Status: SHIPPED | OUTPATIENT
Start: 2025-03-21 | End: 2025-06-02

## 2025-03-21 RX ORDER — LEVOTHYROXINE SODIUM 50 UG/1
50 TABLET ORAL DAILY
Qty: 90 TABLET | Refills: 0 | Status: SHIPPED | OUTPATIENT
Start: 2025-03-21 | End: 2025-06-02

## 2025-03-21 NOTE — TELEPHONE ENCOUNTER
PATIENT IN OFFICE- ASKING FOR REFILLS ON THE FOLLOWING MEDICATIONS BE SENT TO NEW PHARMACY.       rosuvastatin 20 MG Oral Tab   metoprolol succinate ER 50 MG Oral Tablet 24 Hr   levothyroxine 50 MCG Oral Tab     AND ANY TIER 1 MEDICATION.     [facility] - [address] [phone], [phone]     PATIENT ALSO ASKING IF [facility] WILL FILL FARXIGA AND ENTRESTO OR SHOULD SHE REACH OUT TO HER CARDIOLOGIST?

## 2025-03-21 NOTE — TELEPHONE ENCOUNTER
Thyroid Medication Protocol Passed       Hypertension Medications Protocol Passed          Cholesterol Medication Protocol Passed        Last refill farxiga 12/23/24 #30 with 11 refills sent by Dr Santana to [facility]-remaining refills sent to [facility]    Left message to call office back -needs to call cardio for Entresto refill-other refills sent

## 2025-03-28 ENCOUNTER — TELEPHONE (OUTPATIENT)
Dept: FAMILY MEDICINE CLINIC | Facility: CLINIC | Age: 65
End: 2025-03-28

## 2025-03-28 DIAGNOSIS — R09.89 THROAT TIGHTNESS: Primary | ICD-10-CM

## 2025-03-28 NOTE — TELEPHONE ENCOUNTER
Patient advised. Verbalized understanding.   Referral faxed to Dr Oneill [phone]   Fax [phone]

## 2025-03-28 NOTE — TELEPHONE ENCOUNTER
Left message to call back         Luis Oneill  [address]  [place] [address] [phone]

## 2025-03-28 NOTE — TELEPHONE ENCOUNTER
PATIENT IS CALLING THIS AFTERNOON.  PATIENT SAYS SHE HAD HER VISIT WITH DR NELSON CARDIOLOGY.  DR NELSON IS RECOMMENDING CARDIAC PET SCAN.  REASON FOR CALL IS THAT FREDY DEL ROSARIO FOR DR NELSON IS ALSO RECOMMENDING AN ENDOSCOPY.  PATIENT ASKING IF THERE IS A GI CLOSER TO HER HOME.  PATIENT DOES NOT WANT TO DRIVE OUT TO [place], BUT WILL IF SHE HAS TO.

## 2025-03-28 NOTE — TELEPHONE ENCOUNTER
I reviewed Dr. Chi's note. Looks like he is concerned about possible GERD.   I placed a referral for Dr. Oneill in [place]. If she needs an EGD, very likely will need to be done in hospital setting due to her cardiac history.

## 2025-04-08 ENCOUNTER — HOSPITAL ENCOUNTER (OUTPATIENT)
Dept: MAMMOGRAPHY | Age: 65
Discharge: HOME | End: 2025-04-08
Attending: NURSE PRACTITIONER
Payer: MEDICARE

## 2025-04-08 DIAGNOSIS — Z12.31 ENCOUNTER FOR SCREENING MAMMOGRAM FOR MALIGNANT NEOPLASM OF BREAST: ICD-10-CM

## 2025-04-08 PROCEDURE — 77063 BREAST TOMOSYNTHESIS BI: CPT | Performed by: NURSE PRACTITIONER

## 2025-04-08 PROCEDURE — 77067 SCR MAMMO BI INCL CAD: CPT | Performed by: NURSE PRACTITIONER

## 2025-04-14 DIAGNOSIS — F51.05 INSOMNIA DUE TO OTHER MENTAL DISORDER: ICD-10-CM

## 2025-04-14 DIAGNOSIS — F99 INSOMNIA DUE TO OTHER MENTAL DISORDER: ICD-10-CM

## 2025-04-14 RX ORDER — CLONAZEPAM 0.5 MG/1
0.25 TABLET ORAL 2 TIMES DAILY PRN
Qty: 20 TABLET | Refills: 0 | Status: SHIPPED | OUTPATIENT
Start: 2025-04-14

## 2025-04-14 NOTE — TELEPHONE ENCOUNTER
Last office visit 2/19/25  Last refilled on 2/19/25 for # 20 with 0 refills  Future Appointments   Date Time Provider Department Center   5/19/2025  9:20 AM Loulou Santana MD [facility] [place]        Thank you.

## 2025-04-21 ENCOUNTER — TELEPHONE (OUTPATIENT)
Dept: FAMILY MEDICINE CLINIC | Facility: CLINIC | Age: 65
End: 2025-04-21

## 2025-04-21 NOTE — TELEPHONE ENCOUNTER
Patient due for Labs  MCM sent  Future Appointments   Date Time Provider Department Center   5/19/2025  9:20 AM Loulou Santana MD [facility] [place]

## 2025-04-22 ENCOUNTER — PATIENT MESSAGE (OUTPATIENT)
Dept: FAMILY MEDICINE CLINIC | Facility: CLINIC | Age: 65
End: 2025-04-22

## 2025-04-22 ENCOUNTER — OFFICE VISIT (OUTPATIENT)
Dept: FAMILY MEDICINE CLINIC | Facility: CLINIC | Age: 65
End: 2025-04-22
Payer: MEDICARE

## 2025-04-22 VITALS
HEART RATE: 70 BPM | OXYGEN SATURATION: 99 % | RESPIRATION RATE: 18 BRPM | DIASTOLIC BLOOD PRESSURE: 72 MMHG | TEMPERATURE: 98 F | SYSTOLIC BLOOD PRESSURE: 116 MMHG

## 2025-04-22 DIAGNOSIS — R30.0 DYSURIA: Primary | ICD-10-CM

## 2025-04-22 DIAGNOSIS — R53.83 OTHER FATIGUE: ICD-10-CM

## 2025-04-22 LAB
APPEARANCE: CLEAR
BILIRUBIN: NEGATIVE
GLUCOSE (URINE DIPSTICK): 500 MG/DL
KETONES (URINE DIPSTICK): NEGATIVE MG/DL
LEUKOCYTES: NEGATIVE
MULTISTIX LOT#: ABNORMAL NUMERIC
NITRITE, URINE: NEGATIVE
OCCULT BLOOD: NEGATIVE
PH, URINE: 6.5 (ref 4.5–8)
PROTEIN (URINE DIPSTICK): NEGATIVE MG/DL
SPECIFIC GRAVITY: 1.01 (ref 1–1.03)
URINE-COLOR: YELLOW
UROBILINOGEN,SEMI-QN: 0.2 MG/DL (ref 0–1.9)

## 2025-04-22 PROCEDURE — 87086 URINE CULTURE/COLONY COUNT: CPT | Performed by: FAMILY MEDICINE

## 2025-04-22 RX ORDER — LORATADINE 10 MG/1
10 TABLET ORAL AS NEEDED
COMMUNITY

## 2025-04-22 RX ORDER — PANTOPRAZOLE SODIUM 40 MG/1
40 TABLET, DELAYED RELEASE ORAL DAILY
COMMUNITY
Start: 2025-03-31

## 2025-04-22 NOTE — PROGRESS NOTES
Chief Complaint   Patient presents with    Fatigue     Last 2-4 days \"feeling tired\"  Pain in mid back  Feels \"irritation\" in perineal area  Denies fever        HPI:    Patient ID: Maricruz De La Rosa is a 64 year old female.    HPI     63yo F presents with fatigue over the last few days with irritation in  region and urinary frequency/urgency:   Last weekend felt increase in urinary frequency, drank cranberry juice and felt like was better.   However last couple of days has felt more fatigued, tired   +urinary frequency; +urinary urgency   Urine is still yellow, slightly darker yellow; no blood  No burning sensation   No fevers/chills   No abd pain, n/v, or diarrhea    Last couple of days had sore back - b/l low back pain, dull ache; today feels better.   No fevers/chills.   Has been doing yardwork over last couple of weekends     No other issues reported     Review of Systems   Constitutional:  Positive for fatigue. Negative for chills and fever.   HENT:  Negative for congestion and sore throat.    Eyes:  Negative for visual disturbance.   Respiratory:  Negative for shortness of breath and wheezing.    Cardiovascular:  Negative for chest pain and leg swelling.   Gastrointestinal:  Negative for abdominal pain, blood in stool, diarrhea, nausea and vomiting.   Genitourinary:  Positive for frequency and urgency. Negative for decreased urine volume, dysuria, flank pain, hematuria, pelvic pain, vaginal bleeding and vaginal discharge.   Musculoskeletal:  Positive for back pain. Negative for gait problem.   Skin:  Negative for rash.   Neurological:  Negative for dizziness, light-headedness and headaches.           Current Medications[1]  Allergies:Allergies[2]    HISTORY:  Past Medical History[3]   Past Surgical History[4]   Family History[5]   Social History: Short Social Hx on File[6]     PHYSICAL EXAM:    /72 (BP Location: Right arm, Patient Position: Sitting, Cuff Size: adult)   Pulse 70   Temp 97.8 °F (36.6 °C)    Resp 18   SpO2 99%    Physical Exam         ASSESSMENT/PLAN:     Assessment & Plan  Dysuria  Udip in office not indicative of UTI however pt with significant glucose in urine  UA/Ucx sent   Will follow with blood tests below (possible diabetes)   Stay hydrated   Orders:    Urine Dip, auto without Micro    Urine Culture, Routine [E]; Future    Other fatigue    Orders:    CBC With Differential With Platelet; Future    Hemoglobin A1C; Future    Comp Metabolic Panel (14); Future    TSH W Reflex To Free T4; Future           Low back pain now resolved - likely related to yard work. Advised to give her body a rest and to stretch and use heat if back aches again.     If symptoms not resolving in the next few days pt to rtc.  Will follow up based on lab results accordingly.     Instructions provided as documented in the AVS.    The patient indicated understanding of the diagnosis and agreed with the plan of care.    Red flag s/s reviewed and discussed for conditions listed including concerns for prompt ED evaluation  Medical compliance with plan discussed and risks of non-compliance reviewed  Education completed on disease process, etiology & prognosis  Proper usage and side effects of medications reviewed & discussed    Return to clinic as clinically indicated or as discussed         [1]   Current Outpatient Medications   Medication Sig Dispense Refill    pantoprazole 40 MG Oral Tab EC Take 1 tablet (40 mg total) by mouth daily.      loratadine 10 MG Oral Tab Take 1 tablet (10 mg total) by mouth as needed for Allergies (takes 1/2 tab).      CLONAZEPAM 0.5 MG Oral Tab TAKE 1/2 TABLET(0.25 MG) BY MOUTH TWICE DAILY AS NEEDED FOR ANXIETY 20 tablet 0    rosuvastatin 20 MG Oral Tab Take 1 tablet (20 mg total) by mouth daily. 90 tablet 0    metoprolol succinate ER 50 MG Oral Tablet 24 Hr Take 1 tablet (50 mg total) by mouth daily. 90 tablet 0    levothyroxine 50 MCG Oral Tab Take 1 tablet (50 mcg total) by mouth daily. 90  tablet 0    FARXIGA 10 MG Oral Tab Take 1 tablet (10 mg total) by mouth daily. 90 tablet 2    benzonatate 200 MG Oral Cap Take 1 capsule (200 mg total) by mouth 3 (three) times daily as needed. 30 capsule 0    cholecalciferol 125 MCG (5000 UT) Oral Tab Take 1 tablet (5,000 Units total) by mouth daily.      omega-3 fatty acids 1000 MG Oral Cap       Multiple Vitamin (MULTI-VITAMIN) Oral Tab       sacubitril-valsartan (ENTRESTO) 49-51 MG Oral Tab Take 1 tablet by mouth every morning. 90 tablet 0    sacubitril-valsartan (ENTRESTO)  MG Oral Tab Take 1 tablet by mouth every evening. 90 tablet 0    calcium carbonate 500 MG Oral Chew Tab Chew 1 tablet (500 mg total) by mouth daily as needed for Reflux. (Patient not taking: Reported on 4/22/2025)     [2]   Allergies  Allergen Reactions    Sulfa Antibiotics RASH    Seasonal ITCHING   [3]   Past Medical History:   Abdominal distention    Abdominal hernia    Hiatel hernia    Abdominal pain    From intermitent constipation    Anxiety    After pacemaker    Arrhythmia    pvcs    Arthritis    Had MRI of spine due to pain    Back pain    Did yoga to help and improved posture    Back problem    low back pain    Belching    After new medicaions with pacemaker    Bloating    Occasional    Bradycardia    Cardiac defibrillator in place    Cardiomyopathy (HCC)    Congestive heart disease (HCC)    Constipation    More frequent with new medications    Diarrhea, unspecified    On and off less frequent now    Disorder of thyroid    Diverticulosis of large intestine    Dizziness    Med increase caused dizzyness at cardio workout    Ejection fraction < 50%    15-20% 6/2021      Esophageal reflux    Flatulence/gas pain/belching    Occational    Food intolerance    Pay attention to what I eat    Frequent urination    Frequent use of laxatives    Senna soft gels and recently metamucil    Hearing impaired person, bilateral    mild    Heart palpitations    Heartburn    Use  omeprazole or tums  as needed    Hemorrhoids    Occationally    High blood pressure    High cholesterol    History of 2019 novel coronavirus disease (COVID-19)    No Hospitalizations. Symptoms: cough,loss of taste and smell,fatigue headache    Hx of motion sickness    Hyperlipidemia    Indigestion    Iccationally    Irregular bowel habits    Since new medications after surgery    Pacemaker    Pain with bowel movements    Sometimes when constipated    Sleep apnea    no tx    Sleep disturbance    Diagnosed with severe sleep apnea 8/18/21    Stool incontinence    Stress     diagnosed with cancer    Thyroid disease    Visual impairment    glasses,contacts    Wears glasses    Glasses since I entered 2nd grade    Weight loss    Doing cardio rehab lost 8 pounds   [4]   Past Surgical History:  Procedure Laterality Date    Cardiac defibrillator placement      [employer]    Cardiac pacemaker placement  6/21/21    Colonoscopy N/A 4/8/2022    Procedure: COLONOSCOPY;  Surgeon: Cash Cristobal DO;  Location:  ENDOSCOPY   [5]   Family History  Problem Relation Age of Onset    Heart Disorder Father         AAA    Diabetes Mother 85        Passed away in 2019    Hypertension Mother     Other (Other) Mother         thyroid    Cancer Maternal Grandfather         throat    Other (Other) Sister         thyroid    Diabetes Brother         Got taken off medication/improved his diet   [6]   Social History  Socioeconomic History    Marital status:    Tobacco Use    Smoking status: Never    Smokeless tobacco: Never   Vaping Use    Vaping status: Never Used   Substance and Sexual Activity    Alcohol use: Yes     Alcohol/week: 4.0 standard drinks of alcohol     Types: 4 Cans of beer per week     Comment: weekly    Drug use: No    Sexual activity: Not Currently     Social Drivers of Health      Received from [facility]    Housing Stability

## 2025-04-23 NOTE — TELEPHONE ENCOUNTER
Patient wants to wait until May to have labs done.  Patient is leaving for vacation on 4/28/25 and will be back on May 10th and will have labs done after that. States she will change her diet while she is on vacation.   Ok to wait?

## 2025-04-24 ENCOUNTER — LAB ENCOUNTER (OUTPATIENT)
Dept: LAB | Age: 65
End: 2025-04-24
Attending: FAMILY MEDICINE
Payer: MEDICARE

## 2025-04-24 ENCOUNTER — TELEPHONE (OUTPATIENT)
Dept: FAMILY MEDICINE CLINIC | Facility: CLINIC | Age: 65
End: 2025-04-24

## 2025-04-24 DIAGNOSIS — R53.83 OTHER FATIGUE: ICD-10-CM

## 2025-04-24 LAB
ALBUMIN SERPL-MCNC: 5.1 G/DL (ref 3.2–4.8)
ALBUMIN/GLOB SERPL: 1.9 {RATIO} (ref 1–2)
ALP LIVER SERPL-CCNC: 49 U/L (ref 55–142)
ALT SERPL-CCNC: 20 U/L (ref 10–49)
ANION GAP SERPL CALC-SCNC: 8 MMOL/L (ref 0–18)
AST SERPL-CCNC: 29 U/L (ref ?–34)
BASOPHILS # BLD AUTO: 0.04 X10(3) UL (ref 0–0.2)
BASOPHILS NFR BLD AUTO: 0.6 %
BILIRUB SERPL-MCNC: 0.4 MG/DL (ref 0.2–1.1)
BUN BLD-MCNC: 14 MG/DL (ref 9–23)
CALCIUM BLD-MCNC: 10.4 MG/DL (ref 8.7–10.6)
CHLORIDE SERPL-SCNC: 102 MMOL/L (ref 98–112)
CHOLEST SERPL-MCNC: 153 MG/DL (ref ?–200)
CO2 SERPL-SCNC: 26 MMOL/L (ref 21–32)
CREAT BLD-MCNC: 0.88 MG/DL (ref 0.55–1.02)
EGFRCR SERPLBLD CKD-EPI 2021: 72 ML/MIN/1.73M2 (ref 60–?)
EOSINOPHIL # BLD AUTO: 0.32 X10(3) UL (ref 0–0.7)
EOSINOPHIL NFR BLD AUTO: 4.9 %
ERYTHROCYTE [DISTWIDTH] IN BLOOD BY AUTOMATED COUNT: 12.7 %
EST. AVERAGE GLUCOSE BLD GHB EST-MCNC: 120 MG/DL (ref 68–126)
FASTING PATIENT LIPID ANSWER: YES
FASTING STATUS PATIENT QL REPORTED: YES
GLOBULIN PLAS-MCNC: 2.7 G/DL (ref 2–3.5)
GLUCOSE BLD-MCNC: 94 MG/DL (ref 70–99)
HBA1C MFR BLD: 5.8 % (ref ?–5.7)
HCT VFR BLD AUTO: 42.6 % (ref 35–48)
HDLC SERPL-MCNC: 53 MG/DL (ref 40–59)
HGB BLD-MCNC: 13.7 G/DL (ref 12–16)
IMM GRANULOCYTES # BLD AUTO: 0.02 X10(3) UL (ref 0–1)
IMM GRANULOCYTES NFR BLD: 0.3 %
LDLC SERPL CALC-MCNC: 74 MG/DL (ref ?–100)
LYMPHOCYTES # BLD AUTO: 1.62 X10(3) UL (ref 1–4)
LYMPHOCYTES NFR BLD AUTO: 24.7 %
MCH RBC QN AUTO: 29.1 PG (ref 26–34)
MCHC RBC AUTO-ENTMCNC: 32.2 G/DL (ref 31–37)
MCV RBC AUTO: 90.6 FL (ref 80–100)
MONOCYTES # BLD AUTO: 0.59 X10(3) UL (ref 0.1–1)
MONOCYTES NFR BLD AUTO: 9 %
NEUTROPHILS # BLD AUTO: 3.98 X10 (3) UL (ref 1.5–7.7)
NEUTROPHILS # BLD AUTO: 3.98 X10(3) UL (ref 1.5–7.7)
NEUTROPHILS NFR BLD AUTO: 60.5 %
NONHDLC SERPL-MCNC: 100 MG/DL (ref ?–130)
OSMOLALITY SERPL CALC.SUM OF ELEC: 282 MOSM/KG (ref 275–295)
PLATELET # BLD AUTO: 346 10(3)UL (ref 150–450)
POTASSIUM SERPL-SCNC: 4.9 MMOL/L (ref 3.5–5.1)
PROT SERPL-MCNC: 7.8 G/DL (ref 5.7–8.2)
RBC # BLD AUTO: 4.7 X10(6)UL (ref 3.8–5.3)
SODIUM SERPL-SCNC: 136 MMOL/L (ref 136–145)
TRIGL SERPL-MCNC: 154 MG/DL (ref 30–149)
TSI SER-ACNC: 1.27 UIU/ML (ref 0.55–4.78)
VLDLC SERPL CALC-MCNC: 24 MG/DL (ref 0–30)
WBC # BLD AUTO: 6.6 X10(3) UL (ref 4–11)

## 2025-04-24 PROCEDURE — 80053 COMPREHEN METABOLIC PANEL: CPT | Performed by: NURSE PRACTITIONER

## 2025-04-24 PROCEDURE — 83036 HEMOGLOBIN GLYCOSYLATED A1C: CPT

## 2025-04-24 PROCEDURE — 85025 COMPLETE CBC W/AUTO DIFF WBC: CPT | Performed by: NURSE PRACTITIONER

## 2025-04-24 PROCEDURE — 36415 COLL VENOUS BLD VENIPUNCTURE: CPT | Performed by: NURSE PRACTITIONER

## 2025-04-24 PROCEDURE — 84443 ASSAY THYROID STIM HORMONE: CPT | Performed by: NURSE PRACTITIONER

## 2025-04-24 PROCEDURE — 80061 LIPID PANEL: CPT | Performed by: NURSE PRACTITIONER

## 2025-04-24 NOTE — TELEPHONE ENCOUNTER
----- Message from Rosy Haque sent at 4/24/2025  3:36 PM CDT -----  Results reviewed.   Chemistries normal  Cholesterol stable  Thyroid function normal    Please forward to cardiology    ----- Message -----  From: Lab, Background User  Sent: 4/24/2025   3:27 PM CDT  To: GIBSON Medina

## 2025-04-25 NOTE — TELEPHONE ENCOUNTER
----- Message from Rosy Haque sent at 4/25/2025  2:36 PM CDT -----  Results reviewed.   No anemia    ----- Message -----  From: Lab, Background User  Sent: 4/24/2025   3:27 PM CDT  To: Rosy Haque, APRN

## 2025-05-19 ENCOUNTER — OFFICE VISIT (OUTPATIENT)
Dept: FAMILY MEDICINE CLINIC | Facility: CLINIC | Age: 65
End: 2025-05-19
Payer: MEDICARE

## 2025-05-19 VITALS
RESPIRATION RATE: 18 BRPM | HEIGHT: 63 IN | DIASTOLIC BLOOD PRESSURE: 78 MMHG | BODY MASS INDEX: 26.22 KG/M2 | SYSTOLIC BLOOD PRESSURE: 118 MMHG | HEART RATE: 91 BPM | TEMPERATURE: 97 F | OXYGEN SATURATION: 99 % | WEIGHT: 148 LBS

## 2025-05-19 DIAGNOSIS — K21.9 GASTROESOPHAGEAL REFLUX DISEASE, UNSPECIFIED WHETHER ESOPHAGITIS PRESENT: ICD-10-CM

## 2025-05-19 DIAGNOSIS — G47.33 OBSTRUCTIVE SLEEP APNEA SYNDROME: ICD-10-CM

## 2025-05-19 DIAGNOSIS — E03.9 ACQUIRED HYPOTHYROIDISM: ICD-10-CM

## 2025-05-19 DIAGNOSIS — E78.00 PURE HYPERCHOLESTEROLEMIA: ICD-10-CM

## 2025-05-19 DIAGNOSIS — Z00.00 ANNUAL PHYSICAL EXAM: Primary | ICD-10-CM

## 2025-05-19 DIAGNOSIS — I50.9 CHRONIC CONGESTIVE HEART FAILURE, UNSPECIFIED HEART FAILURE TYPE (HCC): ICD-10-CM

## 2025-05-19 DIAGNOSIS — Z95.0 S/P PLACEMENT OF CARDIAC PACEMAKER: ICD-10-CM

## 2025-05-19 DIAGNOSIS — I42.0 DILATED CARDIOMYOPATHY (HCC): ICD-10-CM

## 2025-05-19 DIAGNOSIS — N81.4 UTERINE PROLAPSE: ICD-10-CM

## 2025-05-19 PROBLEM — I49.3 PVC'S (PREMATURE VENTRICULAR CONTRACTIONS): Status: RESOLVED | Noted: 2019-07-02 | Resolved: 2025-05-19

## 2025-05-19 PROBLEM — R93.1 HIGH CORONARY ARTERY CALCIUM SCORE: Status: RESOLVED | Noted: 2019-07-02 | Resolved: 2025-05-19

## 2025-05-19 PROBLEM — F51.05 INSOMNIA DUE TO OTHER MENTAL DISORDER: Status: RESOLVED | Noted: 2023-02-23 | Resolved: 2025-05-19

## 2025-05-19 PROBLEM — E78.1 HYPERTRIGLYCERIDEMIA: Status: RESOLVED | Noted: 2018-01-03 | Resolved: 2025-05-19

## 2025-05-19 PROBLEM — Z95.810 S/P ICD (INTERNAL CARDIAC DEFIBRILLATOR) PROCEDURE: Status: RESOLVED | Noted: 2021-06-30 | Resolved: 2025-05-19

## 2025-05-19 PROBLEM — F99 INSOMNIA DUE TO OTHER MENTAL DISORDER: Status: RESOLVED | Noted: 2023-02-23 | Resolved: 2025-05-19

## 2025-05-19 NOTE — PROGRESS NOTES
Subjective:   Maricruz De La Rosa is a 64 year old female who presents for a Initial Annual Wellness Visit (outside the first 12 months of Medicare eligibility, no prior AWV) and stable chronic illnesses (not addressed in visit).   History of Present Illness            Seeing cardiology and gi.   Seeing gyn. It is going well.   She decline pap today no symptoms today.   She did stress test and pet scan.   UTI symptoms are resolved.  Exercise walking  Diet- healthy  Sleep- 6-7 hr at night.   Vaccine decline.         History/Other:   Fall Risk Assessment:   She has been screened for Falls and is low risk.      Cognitive Assessment:   She had a completely normal cognitive assessment - see flowsheet entries     Functional Ability/Status:   Maricruz De La Rosa has some abnormal functions as listed below:  She has Dressing and/or Bathing issues based on screening of functional status.  Difficulty dressing or bathing?: (Patient-Rptd) No  Bathing or Showering: (Patient-Rptd) Able without help  Dressing: (Patient-Rptd) Cannot do without help  She has Driving difficulties based on screening of functional status. She has Hearing problems based on screening of functional status.She has Vision problems based on screening of functional status.       Depression Screening (PHQ):  PHQ-2 SCORE: 0  , done 5/19/2025   Last [place] Suicide Screening on 5/19/2025 was No Risk.         Advanced Directives:   She does have a Living Will but we do NOT have it on file in Epic.    She does have a POA but we do NOT have it on file in Epic.    Discussed Advance Care Planning with patient (and family/surrogate if present). Standard forms made available to patient in After Visit Summary.      Problem List[1]  Allergies:  She is allergic to sulfa antibiotics and seasonal.    Current Medications:  Active Meds, Sig Only[2]    Medical History:  She  has a past medical history of Abdominal distention (Occasional), Abdominal hernia (2018), Abdominal pain  (01/01/2020), Anxiety (06/30/2021), Arrhythmia, Arthritis (2002), Back pain (2002), Back problem, Belching (06/25/2021), Bloating, Bradycardia (06/15/2021), Cardiac defibrillator in place (06/21/2021), Cardiomyopathy (HCC), Congestive heart disease (HCC), Constipation (06/22/2021), Diarrhea, unspecified (06/30/2021), Disorder of thyroid, Diverticulosis of large intestine, Dizziness (11/22/2021), Ejection fraction < 50% (06/07/2021), Esophageal reflux, Flatulence/gas pain/belching, Food intolerance (2018), Frequent urination (06/30/2021), Frequent use of laxatives (06/22/2021), Hearing impaired person, bilateral, Heart palpitations (2018), Heartburn (01/01/2010), Hemorrhoids (01/01/2016), High blood pressure, High cholesterol, History of 2019 novel coronavirus disease (COVID-19) (10/2021), motion sickness, Hyperlipidemia, Indigestion (01/01/2010), Irregular bowel habits (06/30/2021), Pacemaker (06/21/2021), Pain with bowel movements (01/01/2021), Sleep apnea (08/18/2021), Sleep disturbance (2019), Stool incontinence (06/30/2021), Stress (2018), Thyroid disease (06/21/2021), Visual impairment, Wears glasses (1965), and Weight loss (11/01/2021).  Surgical History:  She  has a past surgical history that includes Cardiac pacemaker placement (6/21/21); Cardiac defibrillator placement; and colonoscopy (N/A, 4/8/2022).   Family History:  Her family history includes Cancer in her maternal grandfather; Diabetes in her brother; Diabetes (age of onset: 85) in her mother; Heart Disorder in her father; Hypertension in her mother; Other in her mother and sister.  Social History:  She  reports that she has never smoked. She has never used smokeless tobacco. She reports current alcohol use of about 4.0 standard drinks of alcohol per week. She reports that she does not use drugs.    Tobacco:  She has never smoked tobacco.    CAGE Alcohol Screen:   CAGE screening score of 0 on 5/19/2025, showing low risk of alcohol abuse.      Patient  Care Team:  Loulou Santana MD as PCP - General (Family Medicine)  Cash Cristobal DO (GASTROENTEROLOGY)  Doni Rico MD (CARDIOLOGY)  Jaylen Pierce MD (Cardiovascular Diseases)  Luis Oneill (Internal Medicine)    Review of Systems  Negative cough cold congestion fever chills chest pain shortness of breath nausea vomiting blood in urine blood in stool or vaginal bleeding    Objective:   Physical Exam  Constitutional:       General: She is not in acute distress.     Appearance: She is not ill-appearing.   HENT:      Right Ear: Tympanic membrane normal.      Left Ear: Tympanic membrane normal.      Nose: No congestion or rhinorrhea.      Mouth/Throat:      Pharynx: No oropharyngeal exudate or posterior oropharyngeal erythema.   Cardiovascular:      Rate and Rhythm: Normal rate and regular rhythm.      Pulses: Normal pulses.      Heart sounds: Normal heart sounds.   Pulmonary:      Effort: Pulmonary effort is normal.      Breath sounds: Normal breath sounds.   Abdominal:      General: There is no distension.      Palpations: Abdomen is soft.      Tenderness: There is no abdominal tenderness. There is no guarding or rebound.   Musculoskeletal:      Right lower leg: No edema.      Left lower leg: No edema.   Lymphadenopathy:      Cervical: No cervical adenopathy.   Skin:     General: Skin is warm.      Capillary Refill: Capillary refill takes less than 2 seconds.   Neurological:      General: No focal deficit present.      Mental Status: She is alert.           /78 (BP Location: Left arm, Patient Position: Sitting, Cuff Size: adult)   Pulse 91   Temp 96.6 °F (35.9 °C) (Temporal)   Resp 18   Ht 5' 3\" (1.6 m)   Wt 148 lb (67.1 kg)   SpO2 99%   BMI 26.22 kg/m²  Estimated body mass index is 26.22 kg/m² as calculated from the following:    Height as of this encounter: 5' 3\" (1.6 m).    Weight as of this encounter: 148 lb (67.1 kg).    Medicare Hearing Assessment:   Hearing Screening    Time taken:  5/19/2025  9:23 AM  Screening Method: Finger Rub  Finger Rub Result: Pass         Visual Acuity:   Right Eye Visual Acuity: Corrected Right Eye Chart Acuity: 20/40   Left Eye Visual Acuity: Corrected Left Eye Chart Acuity: 20/25   Both Eyes Visual Acuity: Corrected Both Eyes Chart Acuity: 20/25   Able To Tolerate Visual Acuity: Yes        Assessment & Plan:   Maricruz De La Rosa is a 64 year old female who presents for a Medicare Assessment.     1. Annual physical exam (Primary)  Doing well  Labs reviewed  DEXA scan up-to-date  Recommend to take calcium and vitamin D and multivitamin.  Mammogram up-to-date  Seeing GI colonoscopy up to date  2. Dilated cardiomyopathy (HCC)  Chronic and stable she is doing much better waiting for her visit on stress test.  Seeing cardiology  3. Chronic congestive heart failure, unspecified heart failure type (HCC)  Plan as above  4. S/P placement of cardiac pacemaker  Chronic and stable  Doing well  5. Pure hypercholesterolemia  Chronic and stable  Seeing cardiology  On medication  6. Acquired hypothyroidism  Chronic and stable  On medication  7. Gastroesophageal reflux disease, unspecified whether esophagitis present  Chronic and stable  On medication  8. Uterine prolapse  Chronic and stable  Seeing gynecologist  9. Obstructive sleep apnea syndrome  Chronic and stable  [unfilled]            The patient indicates understanding of these issues and agrees to the plan.  Reinforced healthy diet, lifestyle, and exercise.      No follow-ups on file.     Loulou Santana MD, 5/19/2025     Supplementary Documentation:   General Health:  In the past six months, have you lost more than 10 pounds without trying?: (Patient-Rptd) 2 - No  Has your appetite been poor?: (Patient-Rptd) No  Type of Diet: (Patient-Rptd) Balanced  How does the patient maintain a good energy level?: (Patient-Rptd) Daily Walks, Stretching  How would you describe your daily physical activity?: (Patient-Rptd) Moderate  How  would you describe your current health state?: (Patient-Rptd) Good  How do you maintain positive mental well-being?: (Patient-Rptd) Social Interaction, Visiting Friends, Visiting Family  On a scale of 0 to 10, with 0 being no pain and 10 being severe pain, what is your pain level?: (Patient-Rptd) 4 - (Moderate)  In the past six months, have you experienced urine leakage?: (Patient-Rptd) 0-No  At any time do you feel concerned for the safety/well-being of yourself and/or your children, in your home or elsewhere?: (Patient-Rptd) No  Have you had any immunizations at another office such as Influenza, Hepatitis B, Tetanus, or Pneumococcal?: (Patient-Rptd) No    Health Maintenance   Topic Date Due    Zoster Vaccines (1 of 2) Never done    Pneumococcal Vaccine: 50+ Years (2 of 2 - PCV) 08/12/2022    Annual Depression Screening  01/01/2025    Annual Physical  05/30/2025    COVID-19 Vaccine (3 - 2024-25 season) 03/01/2026 (Originally 9/1/2024)    Influenza Vaccine (Season Ended) 10/01/2025    Mammogram  04/08/2026    Pap Smear  05/06/2027    Colorectal Cancer Screening  04/08/2032    DEXA Scan  Completed    Fall Risk Screening (Annual)  Completed    Meningococcal B Vaccine  Aged Out           [1]   Patient Active Problem List  Diagnosis    Gastroesophageal reflux disease    Pure hypercholesterolemia    Dilated cardiomyopathy (HCC)    S/P placement of cardiac pacemaker    Chronic congestive heart failure (HCC)    Acquired hypothyroidism    Obstructive sleep apnea syndrome    Uterine prolapse   [2]   Outpatient Medications Marked as Taking for the 5/19/25 encounter (Office Visit) with Loulou Santana MD   Medication Sig    pantoprazole 40 MG Oral Tab EC Take 1 tablet (40 mg total) by mouth daily.    loratadine 10 MG Oral Tab Take 1 tablet (10 mg total) by mouth as needed for Allergies (takes 1/2 tab).    CLONAZEPAM 0.5 MG Oral Tab TAKE 1/2 TABLET(0.25 MG) BY MOUTH TWICE DAILY AS NEEDED FOR ANXIETY    rosuvastatin 20 MG Oral  Tab Take 1 tablet (20 mg total) by mouth daily.    metoprolol succinate ER 50 MG Oral Tablet 24 Hr Take 1 tablet (50 mg total) by mouth daily.    levothyroxine 50 MCG Oral Tab Take 1 tablet (50 mcg total) by mouth daily.    FARXIGA 10 MG Oral Tab Take 1 tablet (10 mg total) by mouth daily.    benzonatate 200 MG Oral Cap Take 1 capsule (200 mg total) by mouth 3 (three) times daily as needed.    cholecalciferol 125 MCG (5000 UT) Oral Tab Take 1 tablet (5,000 Units total) by mouth daily.    omega-3 fatty acids 1000 MG Oral Cap     Multiple Vitamin (MULTI-VITAMIN) Oral Tab     sacubitril-valsartan (ENTRESTO) 49-51 MG Oral Tab Take 1 tablet by mouth every morning.    sacubitril-valsartan (ENTRESTO)  MG Oral Tab Take 1 tablet by mouth every evening.

## 2025-05-28 DIAGNOSIS — F51.05 INSOMNIA DUE TO OTHER MENTAL DISORDER: ICD-10-CM

## 2025-05-28 DIAGNOSIS — F99 INSOMNIA DUE TO OTHER MENTAL DISORDER: ICD-10-CM

## 2025-05-28 DIAGNOSIS — E78.00 PURE HYPERCHOLESTEROLEMIA: ICD-10-CM

## 2025-05-28 RX ORDER — CLONAZEPAM 0.5 MG/1
0.25 TABLET ORAL 2 TIMES DAILY PRN
Qty: 20 TABLET | Refills: 0 | Status: SHIPPED | OUTPATIENT
Start: 2025-05-28

## 2025-05-28 RX ORDER — ROSUVASTATIN CALCIUM 20 MG/1
20 TABLET, COATED ORAL DAILY
Qty: 90 TABLET | Refills: 0 | Status: SHIPPED | OUTPATIENT
Start: 2025-05-28

## 2025-05-28 NOTE — TELEPHONE ENCOUNTER
Last refill: 04/14/25  Qty 20  W/ 0 refills  Last ov: 05/19/25    Requested Prescriptions     Pending Prescriptions Disp Refills    CLONAZEPAM 0.5 MG Oral Tab [Pharmacy Med Name: CLONAZEPAM 0.5MG TABLETS] 20 tablet 0     Sig: TAKE 1/2 TABLET(0.25 MG) BY MOUTH TWICE DAILY AS NEEDED FOR ANXIETY     No future appointments.

## 2025-06-01 DIAGNOSIS — E03.9 ACQUIRED HYPOTHYROIDISM: ICD-10-CM

## 2025-06-01 DIAGNOSIS — I42.0 DILATED CARDIOMYOPATHY (HCC): ICD-10-CM

## 2025-06-02 RX ORDER — LEVOTHYROXINE SODIUM 50 UG/1
50 TABLET ORAL DAILY
Qty: 90 TABLET | Refills: 0 | Status: SHIPPED | OUTPATIENT
Start: 2025-06-02

## 2025-06-02 RX ORDER — METOPROLOL SUCCINATE 50 MG/1
50 TABLET, EXTENDED RELEASE ORAL DAILY
Qty: 90 TABLET | Refills: 0 | Status: SHIPPED | OUTPATIENT
Start: 2025-06-02

## 2025-06-02 NOTE — TELEPHONE ENCOUNTER
Thyroid Medication Protocol Passed          Hypertension Medications Protocol Passed      Last refill 3/21/25 90 0 refill

## 2025-06-30 DIAGNOSIS — E78.00 PURE HYPERCHOLESTEROLEMIA: ICD-10-CM

## 2025-06-30 DIAGNOSIS — I42.0 DILATED CARDIOMYOPATHY (HCC): ICD-10-CM

## 2025-06-30 DIAGNOSIS — E03.9 ACQUIRED HYPOTHYROIDISM: ICD-10-CM

## 2025-06-30 RX ORDER — ROSUVASTATIN CALCIUM 20 MG/1
20 TABLET, COATED ORAL DAILY
Qty: 90 TABLET | Refills: 0 | OUTPATIENT
Start: 2025-06-30

## 2025-06-30 RX ORDER — LEVOTHYROXINE SODIUM 50 UG/1
50 TABLET ORAL DAILY
Qty: 90 TABLET | Refills: 0 | OUTPATIENT
Start: 2025-06-30

## 2025-06-30 RX ORDER — METOPROLOL SUCCINATE 50 MG/1
50 TABLET, EXTENDED RELEASE ORAL DAILY
Qty: 90 TABLET | Refills: 0 | OUTPATIENT
Start: 2025-06-30

## 2025-06-30 NOTE — TELEPHONE ENCOUNTER
Rx refill requested     Patient requesting 90 day     metoprolol succinate ER 50 MG Oral Tablet 24 Hr     rosuvastatin 20 MG Oral Tab       levothyroxine 50 MCG Oral Tab     [facility] - [address] [phone], [phone]

## 2025-07-16 DIAGNOSIS — E78.00 PURE HYPERCHOLESTEROLEMIA: ICD-10-CM

## 2025-07-16 DIAGNOSIS — I42.0 DILATED CARDIOMYOPATHY (HCC): ICD-10-CM

## 2025-07-16 DIAGNOSIS — E03.9 ACQUIRED HYPOTHYROIDISM: ICD-10-CM

## 2025-07-16 RX ORDER — LEVOTHYROXINE SODIUM 50 UG/1
50 TABLET ORAL DAILY
Qty: 90 TABLET | Refills: 0 | Status: SHIPPED | OUTPATIENT
Start: 2025-07-16

## 2025-07-16 RX ORDER — METOPROLOL SUCCINATE 50 MG/1
50 TABLET, EXTENDED RELEASE ORAL DAILY
Qty: 90 TABLET | Refills: 0 | Status: SHIPPED | OUTPATIENT
Start: 2025-07-16

## 2025-07-16 RX ORDER — ROSUVASTATIN CALCIUM 20 MG/1
20 TABLET, COATED ORAL DAILY
Qty: 90 TABLET | Refills: 0 | Status: SHIPPED | OUTPATIENT
Start: 2025-07-16

## 2025-07-16 NOTE — TELEPHONE ENCOUNTER
Requesting refill for: Rosuvastatin 20 mg tablet    Last office visit 05/19/2025  Last refilled on 05/28/2025 for # 90 tablet with 0 refills  No future appointments.     Requesting refill for: Levothyroxine 50 mcg tab    Last office visit 05/19/2025  Last refilled on 06/02/2025 for # 90 tablet with 0 refills  No future appointments.     Requesting refill for: Metoprolol Succ ER 50 mg tablet    Last office visit 05/19/2025  Last refilled on 06/02/2025 for # 90 tablet with 0 refills  No future appointments.     Thank you.

## (undated) DEVICE — TROCAR: Brand: KII SHIELDED BLADED ACCESS SYSTEM

## (undated) DEVICE — ENDOSCOPY PACK - LOWER: Brand: MEDLINE INDUSTRIES, INC.

## (undated) DEVICE — MEDI-VAC NON-CONDUCTIVE SUCTION TUBING: Brand: CARDINAL HEALTH

## (undated) DEVICE — GENERAL LAPAROS CDS-LF: Brand: MEDLINE INDUSTRIES, INC.

## (undated) DEVICE — SUPER ATRAUMATIC MODIFIED GRASPER TIP, DISPOSABLE: Brand: RENEW

## (undated) DEVICE — TROCAR: Brand: KII® SLEEVE

## (undated) DEVICE — SUT PERMA- 2-0 30IN SH NABSRB BLK L26MM 1/

## (undated) DEVICE — DEVICE FIX L37CM PEEK SMOOTH CANN 30 ABSRB

## (undated) DEVICE — SUT MCRYL 4-0 18IN PS-2 ABSRB UD 19MM 3/8 CIR

## (undated) DEVICE — 40580 - THE PINK PAD - ADVANCED TRENDELENBURG POSITIONING KIT: Brand: 40580 - THE PINK PAD - ADVANCED TRENDELENBURG POSITIONING KIT

## (undated) DEVICE — #15 STERILE STAINLESS BLADE: Brand: STERILE STAINLESS BLADES

## (undated) DEVICE — ZZ-CONVERTED-TO-500976-PENCIL SMK EVAC L10FT MPLR BLDE JAW OPN

## (undated) DEVICE — ANTIBACTERIAL VIOLET BRAIDED (POLYGLACTIN 910), SYNTHETIC ABSORBABLE SUTURE: Brand: COATED VICRYL

## (undated) DEVICE — FILTERLINE NASAL ADULT O2/CO2

## (undated) DEVICE — CURVED JAW CORDLESS ULTRASONIC DISSECTOR: Brand: SONICISION 7

## (undated) DEVICE — 1200CC GUARDIAN II: Brand: GUARDIAN

## (undated) DEVICE — PLUMEPORT ACTIV LAPAROSCOPIC SMOKE FILTRATION DEVICE: Brand: PLUMEPORT ACTIVE

## (undated) DEVICE — SUT ETHBND XL 2-0 18IN SH NABSRB GRN CR 26MM

## (undated) DEVICE — APPLICATOR PREP 26ML CHG 2% ISO ALC 70%

## (undated) DEVICE — SLEEVE COMPR MD KNEE LEN SGL USE KENDALL SCD

## (undated) DEVICE — TROCARS: Brand: KII® BALLOON BLUNT TIP SYSTEM

## (undated) DEVICE — MEDI-VAC SUCTION HANDLE REGULAR CAPACITY: Brand: CARDINAL HEALTH

## (undated) DEVICE — TRAY CATH 16FR F INCL BARDX IC COMPLT CARE

## (undated) DEVICE — VIOLET BRAIDED (POLYGLACTIN 910), SYNTHETIC ABSORBABLE SUTURE: Brand: COATED VICRYL

## (undated) DEVICE — Device: Brand: DEFENDO AIR/WATER/SUCTION AND BIOPSY VALVE

## (undated) NOTE — LETTER
[facility] [address], [address]  Consent for Procedure/Sedation    Date: 06/17/2021    Time: 1617      1. I authorize the performance upon Sunny Dunn the following:  Insertion of automatic cardiac defibrillator      2.  I au Estrella        : 1960       Medical Record #: [id number]

## (undated) NOTE — LETTER
Yifan Melchor D.O.    Surgical Clearance Needed    Date: 6/20/2024                                                                       From: SANDRITA    Attn:  DR NELSON                                                                             Fax:     RE: Surgical Clearance               # of Pages: 1        Patient Name: Maricruz De La Rosa    Patient YOB: 1960    Consult For: CARDIAC CLEARANCE    Surgery: BILATERAL LAPAROSCOPIC INGUINAL HERNIA REPAIR WITH MESH, UMBILICAL HERNIORRHAPHY POSSIBLE MESH    Date of Surgery: TBD AT [facility]         This facsimile transmission is provided for your internal use only. If the reader of this message is not the intended recipient, you are hereby notified that you have received this document in error, and that any review, dissemination, distribution, or copying of this message is strictly prohibited. If you have received this communication in error, please notify us immediately by telephone and return the original message to us by mail.

## (undated) NOTE — LETTER
[facility] [address], [address]  Consent for Procedure/Sedation    Date: 06/17/2021    Time: 1543      1.  I authorize the performance upon Thomas Hays the following:  Cardiac resynchronization therapy with implantable cardio 2021   3:43 PM  Patient Name: Adri Light        : 1960       Medical Record #: [id number]

## (undated) NOTE — Clinical Note
UVP, mostly rectocele. Has #4 ring with support, some persistent bulge. Started vag estrogen. Wants home care.  F/u pessary care

## (undated) NOTE — LETTER
[facility] [address], [address]  Consent for Procedure/Sedation    Date: 06/17/2021    Time: 1530      1.  I authorize the performance upon Radha Payan the following:cardiac catheterization, left ventricular cineangiography, ____________________________________________________    Signature of person authorized                                     Relationship to  to consent for patient: _________________________ patient: ___________________    Witness: _________________________

## (undated) NOTE — LETTER
24    Patient: Maricruz De La Rosa  : 1960 Visit date: 2024    Dear  Loulou Santana MD    Thank you for referring Maricruz De La Rosa to my practice.  Please find my assessment and plan below.        I saw Maricruz in the office, she presents with bilateral inguinal hernias versus Spigelian hernias.  I am scheduling her for laparoscopic repair.  Thank you Cj  Sincerely,       Yifan Melchor DO   CC:   No Recipients

## (undated) NOTE — LETTER
Sylvie Javier   [facility] [address]  [place] [address]           Dear Sylvie Javier     Our records indicate that you have outstanding lab work and or testing that was ordered for you and has not yet been completed:  Lab Frequency Next Occurrence   TSH W REFLEX TO FREE T4 Once 39/56/4231   COMP METABOLIC PANEL (14) Once 04/80/8080   LIPID PANEL Once 02/23/2023      To provide you with the best possible care, please complete these orders at your earliest convenience. If you have recently completed these orders please disregard this letter. If you have any questions please call the office at [phone].      Thank you,     [facility]

## (undated) NOTE — LETTER
[facility] [address], [address]  Consent for Procedure/Sedation    Date: 6/18/2021    Time: 10:30      1.  I authorize the performance upon Caity Charles the following:  Insertion of biventricular implanted cardiac defibrillato Marie Chakraborty        : 1960       Medical Record #: [id number]

## (undated) NOTE — LETTER
[employer] Cardiac Device Communication Tool    Preop to complete    [facility] ([facility]) Phone: [phone] Fax: [phone]   Patient Name Maricruz De La Rosa   Patient  - AGE - SEX 1960 - A: 63 y - female   Surgical Date 2024   Surgical Procedure BILATERAL LAPAROSCOPIC INGUINAL HERNIA REPAIR WITH MESH, UMBILICAL HERNIORRHAPHY POSSIBLE MESH   Surgical Location [facility]   Type of cautery anticipated: Monopolar   SUPINE       Device Clinic to Complete the Information Below, Sign and Fax to [phone]    Pacemaker or ICD    Atrial or atrial-ventricular lead?        Indication for device    Is patient pacemaker dependent?    Has pt had routine f/u and is battery life > 3 months    Is ICD programmed to inhibit therapy w/magnet?    Does device have rate response or other sensor?      Surgical Procedure above [facility] Surgical Procedure @ [facility] and below   < 6 in. from ICD: Reprogram therapies OFF w/  asynchronous pacing if PM dependent  < 6 in. from PM: Reprogram asynchronous if PM   dependent ICD: No Change  PM: No Change   > 6 in. from ICD: Magnet*  > 6 in. from PM:  No Change*  * If PM dependent observe for pacing inhibition and minimize cautery if inhibition is seen                                              Bipolar cautery: No Change     Cardiac Device Management Plan (check one)     ___  Reprogram (PAT Dept to provide Rep w/ arrival date/time)   ___ Magnet    ___ No Change    Comments: ___________________________________________________________________        Signature: ________________________________ Date: ____________ Time: ______________     Print Name: ___________________________________

## (undated) NOTE — LETTER
OUTSIDE TESTING RESULT REQUEST     IMPORTANT: FOR YOUR IMMEDIATE ATTENTION  Please FAX all test results listed below to: [phone]     Testing already done on or about: 2024     * * * * If testing is NOT complete, arrange with patient A.S.A.P. * * * *      Patient Name: Maricruz De La Rosa  Surgery Date: 2024  Medical Record: [id number]  CSN: [id number]  : 1960 - A: 63 y     Sex: female  Surgeon(s):  Yifan Melchor DO  Procedure: BILATERAL LAPAROSCOPIC INGUINAL HERNIA REPAIR WITH MESH, UMBILICAL HERNIORRHAPHY POSSIBLE MESH  Anesthesia Type: General     Surgeon: Yifan Melchor DO     The following Testing and Time Line are REQUIRED PER ANESTHESIA     EKG READ AND SIGNED WITHIN   90 days      Thank You,   Sent by:DARIEN HADLEY

## (undated) NOTE — Clinical Note
Hi - I saw Gardiner Medicmartine today with bulge. I've recommended pessary and vag estrogen. I will work to manage her sx. I appreciate the opportunity to participate in her care.  Thanks, [employer]

## (undated) NOTE — LETTER
[address]  [place] [address]           Dear Derrell Gusman     Our records indicate that you have outstanding lab work and or testing that was ordered for you and has not yet been completed:  Lab Frequency Next Occurrence   TSH W REFLEX TO FREE T4 Once 12/08/2022                                         To provide you with the best possible care, please complete these orders at your earliest convenience. If you have recently completed these orders please disregard this letter. If you have any questions please call the office at [phone].      Thank you,     [facility]

## (undated) NOTE — LETTER
05/09/19        [address]  [place] [address]      Dear Genny Serrano,    [facility] records indicate that you have outstanding lab work and or testing that was ordered for you and has not yet been completed:  Lab Frequency Next Occurrence   ROBERTO SCR

## (undated) NOTE — LETTER
03/11/19        Manuela Mcclain  [address]  [place] [address]      Dear Hannah Landers,    [address] records indicate that you have outstanding lab work and or testing that was ordered for you and has not yet been completed:  Lab Frequency Next Occurrence   ROBERTO SCR

## (undated) NOTE — LETTER
03/14/20    [address] [address]      Dear Caity Charles. To help us provide the highest quality medical care, [facility] uses a sophisticated computer system to track our patient records.  During a review of

## (undated) NOTE — LETTER
OUTSIDE TESTING RESULT REQUEST     IMPORTANT: FOR YOUR IMMEDIATE ATTENTION  Please FAX all test results listed below to: [phone]     Testing already done on or about: 2024     * * * * If testing is NOT complete, arrange with patient A.S.A.P. * * * *      Patient Name: Maricruz De La Rosa  Surgery Date: 2024  Medical Record: [id number]  CSN: [id number]  : 1960 - A: 63 y     Sex: female  Surgeon(s):  Yifan Melchor DO  Procedure: BILATERAL LAPAROSCOPIC INGUINAL HERNIA REPAIR WITH MESH, UMBILICAL HERNIORRHAPHY POSSIBLE MESH  Anesthesia Type: General     Surgeon: Yifan Melchor DO     The following Testing and Time Line are REQUIRED PER ANESTHESIA     Echo in last 6 months       Thank You,   Sent by:vijay HADLEY

## (undated) NOTE — LETTER
OUTSIDE TESTING RESULT REQUEST     IMPORTANT: FOR YOUR IMMEDIATE ATTENTION  Please FAX all test results listed below to: [phone]     * * * * If testing is NOT complete, arrange with patient A.S.A.P. * * * *      Patient Name: Maricruz De L aRosa  Surgery Date: 2024  Medical Record: [id number]  CSN: [id number]  : 1960 - A: 63 y     Sex: female  Surgeon(s):  Yifan Melchor DO  Procedure: BILATERAL LAPAROSCOPIC INGUINAL HERNIA REPAIR WITH MESH, UMBILICAL HERNIORRHAPHY POSSIBLE MESH  Anesthesia Type: General     Surgeon: Yifan Melchor DO     The following Testing and Time Line are REQUIRED PER ANESTHESIA     BMP (requires 4 hour fast) within  90 days      Thank You,   Sent by:DARIEN HADLEY

## (undated) NOTE — Clinical Note
05/22/2017        [address]  [place] [address]      Dear Haydee Kramer records indicate that you have outstanding fasting lab work and or testing that was ordered for you and has not yet been completed:  Lab Frequency Next Occurrence

## (undated) NOTE — LETTER
[address]  [address] [facility] [address], [address] [facility]  Authorization for Surgical Operation and Procedure     Date:_____June 18, 2021______                                                                                                         Time fever and allergic reactions, hemolytic reactions, transmission of diseases such as Hepatitis, AIDS and Cytomegalovirus (CMV) and fluid overload. In the event that I wish to have an autologous transfusion of my own blood, or a directed donor transfusion. applicable recovery period ends for purposes of reinstating the DNAR order.   10. Patients having a sterilization procedure: I understand that if the procedure is successful the results will be permanent and it will therefore be impossible for me to insemin medicine and do additional procedures as necessary. Some examples are: Starting or using an “IV” to give me medicine, fluids or blood during my procedure, and having a breathing tube placed to help me breathe when I’m asleep (intubation).  In the event that but potential complications include headache, bleeding, infection, seizure, irregular heart rhythms, and nerve injury.     I can change my mind about having anesthesia services at any time before I get the medicine.    ______________________________________